# Patient Record
Sex: MALE | Race: WHITE
[De-identification: names, ages, dates, MRNs, and addresses within clinical notes are randomized per-mention and may not be internally consistent; named-entity substitution may affect disease eponyms.]

---

## 2018-03-01 ENCOUNTER — HOSPITAL ENCOUNTER (OUTPATIENT)
Dept: HOSPITAL 47 - RADCTMAIN | Age: 68
Discharge: HOME | End: 2018-03-01
Attending: SURGERY
Payer: MEDICARE

## 2018-03-01 DIAGNOSIS — N28.1: ICD-10-CM

## 2018-03-01 DIAGNOSIS — K57.90: Primary | ICD-10-CM

## 2018-03-01 LAB — BUN SERPL-SCNC: 27 MG/DL (ref 9–20)

## 2018-03-01 PROCEDURE — 36415 COLL VENOUS BLD VENIPUNCTURE: CPT

## 2018-03-01 PROCEDURE — 84520 ASSAY OF UREA NITROGEN: CPT

## 2018-03-01 PROCEDURE — 82565 ASSAY OF CREATININE: CPT

## 2018-03-01 PROCEDURE — 74177 CT ABD & PELVIS W/CONTRAST: CPT

## 2018-03-01 NOTE — CT
EXAMINATION TYPE: CT abdomen pelvis w con

 

DATE OF EXAM: 3/1/2018

 

COMPARISON: 12/4/2017

 

HISTORY: Diverticulitis and abd pain.

 

CT DLP: 1680 mGycm

Automated exposure control for dose reduction was used.

 

TECHNIQUE:  Helical acquisition of images was performed from the lung bases through the pelvis.

 

CONTRAST: 

Performed with Oral Contrast and with IV Contrast, patient injected with 100ml mL of Omnipaque 300.

 

FINDINGS: 

 

There is coarse interstitial density at the lung bases. There is no pleural effusion. Gallbladder is 
large. There are calcified granulomata in the liver. Bile ducts are not dilated. There is no evidence
 of pancreatic mass. Spleen appears normal.

 

There is no adrenal mass. Kidneys show satisfactory contrast opacification. There is no hydronephrosi
s. There is a 2 cm cortical cyst on the lateral left kidney. There is no retroperitoneal adenopathy. 
Abdominal aorta is atheromatous. Bladder distends smoothly. There is no sign of a pelvic mass. There 
is no ascites. There is no sign of free air. I see no intestinal wall thickening. There are diverticu
la in the sigmoid colon. There are surgical clips in the sigmoid colon. I see no sign of diverticulit
is. Appendix appears normal. There is no sign of a bowel obstruction. There are spondylotic changes i
n the lumbar spine and moderate spinal stenosis is present at L3-4.

IMPRESSION: 

EXTENSIVE INFILTRATES AT THE LUNG BASES CONSISTENT WITH PULMONARY FIBROSIS. ATHEROSCLEROTIC VASCULAR 
DISEASE.

 

MODERATE L3-4 BONY SPINAL STENOSIS. LEFT RENAL CORTICAL CYST. NO EVIDENCE OF DIVERTICULITIS. DIVERTIC
ULOSIS. NO ADVERSE CHANGE COMPARED TO OLD EXAM.

## 2018-03-28 ENCOUNTER — HOSPITAL ENCOUNTER (OUTPATIENT)
Dept: HOSPITAL 47 - OR | Age: 68
Discharge: HOME | End: 2018-03-28
Attending: SURGERY
Payer: MEDICARE

## 2018-03-28 VITALS — RESPIRATION RATE: 16 BRPM | TEMPERATURE: 98 F

## 2018-03-28 VITALS — HEART RATE: 72 BPM | SYSTOLIC BLOOD PRESSURE: 144 MMHG | DIASTOLIC BLOOD PRESSURE: 56 MMHG

## 2018-03-28 VITALS — BODY MASS INDEX: 25.5 KG/M2

## 2018-03-28 DIAGNOSIS — Z87.442: ICD-10-CM

## 2018-03-28 DIAGNOSIS — Z79.84: ICD-10-CM

## 2018-03-28 DIAGNOSIS — Z88.0: ICD-10-CM

## 2018-03-28 DIAGNOSIS — I10: ICD-10-CM

## 2018-03-28 DIAGNOSIS — I25.10: ICD-10-CM

## 2018-03-28 DIAGNOSIS — Z87.891: ICD-10-CM

## 2018-03-28 DIAGNOSIS — K80.10: Primary | ICD-10-CM

## 2018-03-28 DIAGNOSIS — E78.5: ICD-10-CM

## 2018-03-28 DIAGNOSIS — R59.0: ICD-10-CM

## 2018-03-28 DIAGNOSIS — Z95.1: ICD-10-CM

## 2018-03-28 DIAGNOSIS — E11.9: ICD-10-CM

## 2018-03-28 DIAGNOSIS — Z88.2: ICD-10-CM

## 2018-03-28 DIAGNOSIS — Z79.82: ICD-10-CM

## 2018-03-28 DIAGNOSIS — Z90.49: ICD-10-CM

## 2018-03-28 DIAGNOSIS — Z96.652: ICD-10-CM

## 2018-03-28 DIAGNOSIS — M19.90: ICD-10-CM

## 2018-03-28 LAB
GLUCOSE BLD-MCNC: 125 MG/DL (ref 75–99)
GLUCOSE BLD-MCNC: 160 MG/DL (ref 75–99)

## 2018-03-28 PROCEDURE — 88304 TISSUE EXAM BY PATHOLOGIST: CPT

## 2018-03-28 PROCEDURE — 47562 LAPAROSCOPIC CHOLECYSTECTOMY: CPT

## 2018-03-28 RX ADMIN — POTASSIUM CHLORIDE SCH MLS: 14.9 INJECTION, SOLUTION INTRAVENOUS at 13:35

## 2018-03-28 RX ADMIN — POTASSIUM CHLORIDE SCH MLS: 14.9 INJECTION, SOLUTION INTRAVENOUS at 14:07

## 2018-03-28 NOTE — P.OP
Date of Procedure: 03/28/18


Preoperative Diagnosis: 


Cholecystitis


Postoperative Diagnosis: 


Cholecystitis


Procedure(s) Performed: 


Laparoscopic cholecystectomy


Anesthesia: ORION


Surgeon: Tashi Muniz


Estimated Blood Loss (ml): 5


Pathology: other (Gallbladder)


Condition: stable


Disposition: PACU


Description of Procedure: 


The patient was placed on the operating table.   The patient received a general 

endotracheal tube anesthesia.    The patients abdomen was prepped and draped 

in the usual sterile fashion.    Through an infraumbilical stab incision, the 

fascia of the anterior abdominal wall was grasped with a pair of Kochers and 

then the Veress needle was placed in the peritoneal cavity.   Position of the 

Veress needle was confirmed with positive drop test.   The abdomen was then 

insufflated.  After adequate insufflation, the 10 mm trocar was placed in the 

peritoneal cavity.    Following this the laparoscope was placed in the 

peritoneal cavity. The patient was placed in the head-up, right side up 

position and then a 5 mm trocar was placed in the right lateral and right 

subcostal position under direct visualization.    A 8 mm trocar was placed in 

the epigastric position.  The gallbladder was grasped in the fundus and 

infundibulum.  Traction  on the gallbladder was placed in the lateral and the 

cephalad positions.  The triangle of Calot  was visualized..   The cystic duct 

was bluntly dissected until the union of the cystic duct and common bile duct 

was seen.    The cystic duct was then divided and sealed with the Harmonic 

scissors.  A PDS Endoloop was then placed throughout the cystic duct stump.  

The cystic artery divided and sealed with the Harmonic scissors.   The 

gallbladder was then removed from the liver bed using Harmonic scissors.   The 

gallbladder was then extracted through the epigastric port site.  Operative 

field was checked for any bleeding spots and Harmonic scissors was used to 

coagulate the liver bed.    The abdomen was irrigated.   The trocars were 

removed.  The skin was closed using interrupted 3-0 Vicryl suture.   Dermabond 

dressing were applied.   The patient tolerated the procedure well.

## 2018-03-28 NOTE — P.GSHP
History of Present Illness


H&P Date: 03/28/18


Chief Complaint: Right upper quadrant pain





This is a 67-year-old male referred from Dr. Ruiz Fairbanks.  Patient is a chronic 

complaints of right upper quadrant pain.  He states he has pain after eating.  

His recent HIDA scan shows a diminished ejection fraction he presents today for 

laparoscopic cholecystectomy for chronic right upper quadrant pain.





Past Medical History


Past Medical History: Coronary Artery Disease (CAD), Diabetes Mellitus, 

Hyperlipidemia, Hypertension, Osteoarthritis (OA), Vascular Disorder


Additional Past Medical History / Comment(s): Hx: Urinary calculus, 

diverticulitis, lower lt abdominal pain,.  GALLBLADDER DISORDER.  EDEMA CELIO 

LEGS.  TINY WOUND LT WISE, (WAS TREATED IN WOUND CLINIC IN PAST) KEEPS DRESSING 

WITH MEDIHONEY, NO DRAINAGE.


History of Any Multi-Drug Resistant Organisms: None Reported


Past Surgical History: Bowel Resection, Coronary Bypass/CABG, Heart 

Catheterization, Hernia Repair, Joint Replacement, Orthopedic Surgery


Additional Past Surgical History / Comment(s): ORIF Rt ankle.  Left knee 

arthroscopy, Total lt knee replacement.  COLONOSCOPY.  CHRONIC LT SHIN WOUND.  

CABG-9/14/2010


Past Anesthesia/Blood Transfusion Reactions: No Reported Reaction, Unable to 

Obtain


Additional Past Anesthesia/Blood Transfusion Reaction / Comment(s): (ADOPTED)


Smoking Status: Former smoker





- Past Family History


  ** Mother


Family Medical History: Unable to Obtain


Additional Family Medical History / Comment(s): adopted





Medications and Allergies


 Home Medications











 Medication  Instructions  Recorded  Confirmed  Type


 


Allopurinol [Zyloprim] 300 mg PO DAILY 05/12/14 03/28/18 History


 


Hydrocodone/Acetaminophen [Vicodin 0.5 - 1 tab PO BID 05/12/14 03/28/18 History





Hp  mg Tablet]    


 


amLODIPine [Norvasc] 10 mg PO HS 05/12/14 03/28/18 History


 


glipiZIDE [Glucotrol XL] 10 mg PO DAILY 05/12/14 03/28/18 History


 


metFORMIN HCL [Glucophage] 500 mg PO QAM 05/12/14 03/28/18 History


 


Aspirin 325 mg PO DAILY 12/06/17 03/28/18 History


 


Acetaminophen [Tylenol Arthritis] 650 mg PO Q8H PRN 03/22/18 03/28/18 History


 


Metoprolol Tartrate [Lopressor] 25 mg PO BID 03/22/18 03/28/18 History


 


Multivitamins, Thera [Multivitamin 1 tab PO DAILY 03/26/18 03/28/18 History





(formulary)]    


 


metFORMIN HCL [Glucophage] 1,000 mg PO HS 03/26/18 03/28/18 History











 Allergies











Allergy/AdvReac Type Severity Reaction Status Date / Time


 


Penicillins Allergy  Unknown Verified 03/28/18 13:18


 


Sulfa (Sulfonamide Allergy  Unknown Verified 03/28/18 13:18





Antibiotics)     














Surgical - Exam


 Vital Signs











Temp Pulse Resp BP Pulse Ox


 


 97.7 F   60   20   162/76   99 


 


 03/28/18 13:26  03/28/18 13:26  03/28/18 13:26  03/28/18 13:26  03/28/18 13:26














- General


well developed, no distress





- Eyes


PERRL





- ENT


normal pinna





- Neck


no masses





- Respiratory


normal expansion





- Cardiovascular


Rhythm: regular





- Abdomen


Abdomen: soft





Results





- Labs


 Abnormal Lab Results - Last 24 Hours (Table)











  03/28/18 Range/Units





  13:29 


 


POC Glucose (mg/dL)  125 H  (75-99)  mg/dL














Assessment and Plan


Assessment: 





Right upper quadrant pain





Chronic cholecystitis





We'll perform laparoscopic cholecystectomy.

## 2018-11-12 ENCOUNTER — HOSPITAL ENCOUNTER (OUTPATIENT)
Dept: HOSPITAL 47 - LABPAT | Age: 68
Discharge: HOME | End: 2018-11-12
Payer: MEDICARE

## 2018-11-12 DIAGNOSIS — E78.1: ICD-10-CM

## 2018-11-12 DIAGNOSIS — I10: ICD-10-CM

## 2018-11-12 DIAGNOSIS — I25.10: ICD-10-CM

## 2018-11-12 DIAGNOSIS — Z01.812: Primary | ICD-10-CM

## 2018-11-12 DIAGNOSIS — R07.9: ICD-10-CM

## 2018-11-12 LAB
ANION GAP SERPL CALC-SCNC: 6 MMOL/L
BUN SERPL-SCNC: 19 MG/DL (ref 9–20)
CHLORIDE SERPL-SCNC: 106 MMOL/L (ref 98–107)
CO2 SERPL-SCNC: 26 MMOL/L (ref 22–30)
ERYTHROCYTE [DISTWIDTH] IN BLOOD BY AUTOMATED COUNT: 4.08 M/UL (ref 4.3–5.9)
ERYTHROCYTE [DISTWIDTH] IN BLOOD: 13.4 % (ref 11.5–15.5)
HCT VFR BLD AUTO: 41.5 % (ref 39–53)
HGB BLD-MCNC: 13.1 GM/DL (ref 13–17.5)
MCH RBC QN AUTO: 32.1 PG (ref 25–35)
MCHC RBC AUTO-ENTMCNC: 31.5 G/DL (ref 31–37)
MCV RBC AUTO: 101.7 FL (ref 80–100)
PLATELET # BLD AUTO: 259 K/UL (ref 150–450)
POTASSIUM SERPL-SCNC: 5 MMOL/L (ref 3.5–5.1)
SODIUM SERPL-SCNC: 138 MMOL/L (ref 137–145)
WBC # BLD AUTO: 7.9 K/UL (ref 3.8–10.6)

## 2018-11-12 PROCEDURE — 84520 ASSAY OF UREA NITROGEN: CPT

## 2018-11-12 PROCEDURE — 36415 COLL VENOUS BLD VENIPUNCTURE: CPT

## 2018-11-12 PROCEDURE — 82565 ASSAY OF CREATININE: CPT

## 2018-11-12 PROCEDURE — 80051 ELECTROLYTE PANEL: CPT

## 2018-11-12 PROCEDURE — 85027 COMPLETE CBC AUTOMATED: CPT

## 2018-11-15 ENCOUNTER — HOSPITAL ENCOUNTER (OUTPATIENT)
Dept: HOSPITAL 47 - CATHCVL | Age: 68
Discharge: HOME | End: 2018-11-15
Payer: MEDICARE

## 2018-11-15 VITALS — RESPIRATION RATE: 16 BRPM | SYSTOLIC BLOOD PRESSURE: 156 MMHG | HEART RATE: 76 BPM | DIASTOLIC BLOOD PRESSURE: 72 MMHG

## 2018-11-15 VITALS — BODY MASS INDEX: 27.9 KG/M2

## 2018-11-15 VITALS — TEMPERATURE: 97.9 F

## 2018-11-15 DIAGNOSIS — Z79.84: ICD-10-CM

## 2018-11-15 DIAGNOSIS — E78.5: ICD-10-CM

## 2018-11-15 DIAGNOSIS — I25.9: ICD-10-CM

## 2018-11-15 DIAGNOSIS — Z88.0: ICD-10-CM

## 2018-11-15 DIAGNOSIS — R94.39: ICD-10-CM

## 2018-11-15 DIAGNOSIS — E78.00: ICD-10-CM

## 2018-11-15 DIAGNOSIS — Z79.899: ICD-10-CM

## 2018-11-15 DIAGNOSIS — Z79.02: ICD-10-CM

## 2018-11-15 DIAGNOSIS — E11.9: ICD-10-CM

## 2018-11-15 DIAGNOSIS — F17.200: ICD-10-CM

## 2018-11-15 DIAGNOSIS — I10: ICD-10-CM

## 2018-11-15 DIAGNOSIS — Z95.1: ICD-10-CM

## 2018-11-15 DIAGNOSIS — I25.110: Primary | ICD-10-CM

## 2018-11-15 LAB — GLUCOSE BLD-MCNC: 136 MG/DL (ref 75–99)

## 2018-11-15 PROCEDURE — 93459 L HRT ART/GRFT ANGIO: CPT

## 2018-11-15 NOTE — LTR
11/15/2018



To: Dr. Fairbanks



Re: Loy Spencer (1950)



Dear Dr. Fairbanks,



MrKatie Spencer underwent a heart catheterization which showed patency of all his

bypasses.



I want to thank you for allowing us to participate in his care. Please do not hesitate

to call if you have any question or concerns.



Sincerely,







Geovani Ivan MD





MMCELINE / CARLON: [id number] / Job#: [id number]

## 2018-11-15 NOTE — CC
CARDIAC CATHETERIZATION REPORT



DATE OF SERVICE:

11/15/2018



PERFORMING PHYSICIAN:

Geovani Ivan MD, interventional cardiologist.



PROCEDURES PERFORMED:

1. Selective left and right coronary angiogram.

2. SVG to first and second obtuse marginal branch angiogram.

3. LIMA to LAD angiogram.

4. Left heart catheterization.



INDICATION:

This is a pleasant 67-year-old gentleman with known history of coronary artery disease

and prior coronary artery bypass grafting with known SVG to OM1 and SVG to OM2 as well

as OJEDA to LAD. He was experiencing chest discomfort with exertion.  Because of that,

heart catheterization was advised.



APPROACH:

Right common femoral artery.



COMPLICATIONS:

None.



LEVEL OF SEDATION:

Moderate, with sedation length of 30 minutes.



PROCEDURE DESCRIPTION:

After obtaining informed consent, the patient was brought to the cardiac cath lab.  The

right common femoral artery was cannulated using micropuncture technique. The

micropuncture wire passed easily. Then I placed a 6-Czech sheath in the right common

femoral artery.  After that I did selective right and left coronary angiogram using JL4

and JR4 catheters. SVG to OM1 and OM2 was performed using the JR4 catheter.  The LIMA

to LAD angiogram was also performed using the JR4 catheter.  After that I did left

heart catheterization using 6-Czech pigtail catheter.  The procedure was completed

without any complication.



SELECTIVE CORONARY ANGIOGRAM:

1. Left main.  The left main appeared to be calcified with disease in the range of 30%

    only.  It bifurcates into left circumflex and left anterior descending artery.

2. The left circumflex is a large-caliber vessel and it is a dominant vessel.  The

    proximal left circumflex appeared to have a lesion in the range of 60%.  The

    circumflex after that in the proximal portion gives rise to OM branch which

    appeared to be bypassed with competitive flow from the SVG to that OM. The mid and

    distal left circumflex appeared to have mild disease only.

3. The LAD is 100% occluded in the proximal portion.

4. The right coronary artery is a small- to medium-caliber vessel and is a nondominant

    vessel with intermediate disease in the mid portion.



CORONARY BYPASS ANGIOGRAM:

1. The SVG to OM1 is patent.

2. The SVG to OM2 is patent.

3. The LIMA to LAD is patent as well.



HEMODYNAMICS:

The left ventricular end-diastolic pressure was 4 mmHg with mild gradient across the

aortic valve.



CONCLUSION:

1. Severe double-vessel coronary artery disease involving the LAD and left circumflex.

2. Patent LIMA to LAD.

3. Patent SVG to OM1.

4. Patent SVG to OM2.



POST-PROCEDURE MANAGEMENT:

1. Maximize medical treatment.

2. Follow up with the patient.





MICHEAL / PRASHANTH: [id number] / Job#: [id number]

## 2018-11-22 ENCOUNTER — HOSPITAL ENCOUNTER (INPATIENT)
Dept: HOSPITAL 47 - EC | Age: 68
LOS: 2 days | Discharge: LEFT BEFORE DISCHARGE | DRG: 69 | End: 2018-11-24
Attending: HOSPITALIST | Admitting: HOSPITALIST
Payer: MEDICARE

## 2018-11-22 DIAGNOSIS — Z79.82: ICD-10-CM

## 2018-11-22 DIAGNOSIS — Z88.0: ICD-10-CM

## 2018-11-22 DIAGNOSIS — Z95.1: ICD-10-CM

## 2018-11-22 DIAGNOSIS — I63.233: ICD-10-CM

## 2018-11-22 DIAGNOSIS — Z96.652: ICD-10-CM

## 2018-11-22 DIAGNOSIS — G45.9: Primary | ICD-10-CM

## 2018-11-22 DIAGNOSIS — E11.51: ICD-10-CM

## 2018-11-22 DIAGNOSIS — N18.3: ICD-10-CM

## 2018-11-22 DIAGNOSIS — E78.5: ICD-10-CM

## 2018-11-22 DIAGNOSIS — Z79.02: ICD-10-CM

## 2018-11-22 DIAGNOSIS — E11.22: ICD-10-CM

## 2018-11-22 DIAGNOSIS — Z79.899: ICD-10-CM

## 2018-11-22 DIAGNOSIS — I12.9: ICD-10-CM

## 2018-11-22 DIAGNOSIS — Z88.2: ICD-10-CM

## 2018-11-22 DIAGNOSIS — M19.90: ICD-10-CM

## 2018-11-22 DIAGNOSIS — I70.8: ICD-10-CM

## 2018-11-22 DIAGNOSIS — H49.02: ICD-10-CM

## 2018-11-22 DIAGNOSIS — I25.10: ICD-10-CM

## 2018-11-22 DIAGNOSIS — Z87.891: ICD-10-CM

## 2018-11-22 DIAGNOSIS — Z87.442: ICD-10-CM

## 2018-11-22 LAB
ALBUMIN SERPL-MCNC: 4.1 G/DL (ref 3.5–5)
ALP SERPL-CCNC: 130 U/L (ref 38–126)
ALT SERPL-CCNC: 54 U/L (ref 21–72)
ANION GAP SERPL CALC-SCNC: 9 MMOL/L
APTT BLD: 24.4 SEC (ref 22–30)
AST SERPL-CCNC: 46 U/L (ref 17–59)
BASOPHILS # BLD AUTO: 0.1 K/UL (ref 0–0.2)
BASOPHILS NFR BLD AUTO: 1 %
BUN SERPL-SCNC: 28 MG/DL (ref 9–20)
CALCIUM SPEC-MCNC: 9.4 MG/DL (ref 8.4–10.2)
CHLORIDE SERPL-SCNC: 105 MMOL/L (ref 98–107)
CK SERPL-CCNC: 120 U/L (ref 55–170)
CO2 SERPL-SCNC: 25 MMOL/L (ref 22–30)
EOSINOPHIL # BLD AUTO: 0.2 K/UL (ref 0–0.7)
EOSINOPHIL NFR BLD AUTO: 2 %
ERYTHROCYTE [DISTWIDTH] IN BLOOD BY AUTOMATED COUNT: 4.02 M/UL (ref 4.3–5.9)
ERYTHROCYTE [DISTWIDTH] IN BLOOD: 13.5 % (ref 11.5–15.5)
GLUCOSE BLD-MCNC: 122 MG/DL (ref 75–99)
GLUCOSE BLD-MCNC: 149 MG/DL (ref 75–99)
GLUCOSE SERPL-MCNC: 103 MG/DL (ref 74–99)
HCT VFR BLD AUTO: 39.7 % (ref 39–53)
HGB BLD-MCNC: 13.5 GM/DL (ref 13–17.5)
INR PPP: 1 (ref ?–1.2)
LYMPHOCYTES # SPEC AUTO: 1.9 K/UL (ref 1–4.8)
LYMPHOCYTES NFR SPEC AUTO: 20 %
MCH RBC QN AUTO: 33.6 PG (ref 25–35)
MCHC RBC AUTO-ENTMCNC: 34 G/DL (ref 31–37)
MCV RBC AUTO: 98.8 FL (ref 80–100)
MONOCYTES # BLD AUTO: 0.8 K/UL (ref 0–1)
MONOCYTES NFR BLD AUTO: 8 %
NEUTROPHILS # BLD AUTO: 6.3 K/UL (ref 1.3–7.7)
NEUTROPHILS NFR BLD AUTO: 68 %
PH UR: 6.5 [PH] (ref 5–8)
PLATELET # BLD AUTO: 254 K/UL (ref 150–450)
POTASSIUM SERPL-SCNC: 4.9 MMOL/L (ref 3.5–5.1)
PROT SERPL-MCNC: 7.3 G/DL (ref 6.3–8.2)
PT BLD: 9.9 SEC (ref 9–12)
SODIUM SERPL-SCNC: 139 MMOL/L (ref 137–145)
SP GR UR: 1.03 (ref 1–1.03)
TROPONIN I SERPL-MCNC: 0.02 NG/ML (ref 0–0.03)
UROBILINOGEN UR QL STRIP: <2 MG/DL (ref ?–2)
WBC # BLD AUTO: 9.4 K/UL (ref 3.8–10.6)

## 2018-11-22 PROCEDURE — 83036 HEMOGLOBIN GLYCOSYLATED A1C: CPT

## 2018-11-22 PROCEDURE — 36415 COLL VENOUS BLD VENIPUNCTURE: CPT

## 2018-11-22 PROCEDURE — 70450 CT HEAD/BRAIN W/O DYE: CPT

## 2018-11-22 PROCEDURE — 80048 BASIC METABOLIC PNL TOTAL CA: CPT

## 2018-11-22 PROCEDURE — 82553 CREATINE MB FRACTION: CPT

## 2018-11-22 PROCEDURE — 93005 ELECTROCARDIOGRAM TRACING: CPT

## 2018-11-22 PROCEDURE — 70496 CT ANGIOGRAPHY HEAD: CPT

## 2018-11-22 PROCEDURE — 81003 URINALYSIS AUTO W/O SCOPE: CPT

## 2018-11-22 PROCEDURE — 85025 COMPLETE CBC W/AUTO DIFF WBC: CPT

## 2018-11-22 PROCEDURE — 85610 PROTHROMBIN TIME: CPT

## 2018-11-22 PROCEDURE — 80053 COMPREHEN METABOLIC PANEL: CPT

## 2018-11-22 PROCEDURE — 85730 THROMBOPLASTIN TIME PARTIAL: CPT

## 2018-11-22 PROCEDURE — 95816 EEG AWAKE AND DROWSY: CPT

## 2018-11-22 PROCEDURE — 80061 LIPID PANEL: CPT

## 2018-11-22 PROCEDURE — 82550 ASSAY OF CK (CPK): CPT

## 2018-11-22 PROCEDURE — 93306 TTE W/DOPPLER COMPLETE: CPT

## 2018-11-22 PROCEDURE — 84484 ASSAY OF TROPONIN QUANT: CPT

## 2018-11-22 PROCEDURE — 83090 ASSAY OF HOMOCYSTEINE: CPT

## 2018-11-22 PROCEDURE — 70551 MRI BRAIN STEM W/O DYE: CPT

## 2018-11-22 PROCEDURE — 70498 CT ANGIOGRAPHY NECK: CPT

## 2018-11-22 RX ADMIN — CEFAZOLIN SCH MLS/HR: 330 INJECTION, POWDER, FOR SOLUTION INTRAMUSCULAR; INTRAVENOUS at 20:14

## 2018-11-22 NOTE — ED
General Adult HPI





- General


Chief complaint: Neuro Symptoms/Deficit


Stated complaint: Visual changes


Time Seen by Provider: 11/22/18 15:50


Source: patient, RN notes reviewed, old records reviewed


Mode of arrival: wheelchair


Limitations: no limitations





- History of Present Illness


Initial comments: 





68-year-old male history of CAD, peripheral vascular disease, and diabetes 

presenting with double vision.  Symptoms began approximately 30 minutes prior 

to arrival.  No injury noted, no trauma.  No headache.  Denies focal numbness 

or weakness.  No history of CVA or TIA.  No chest pain or shortness of breath.  

Patient did have heart catheterization one week ago.  Currently on aspirin and 

Plavix.





- Related Data


 Home Medications











 Medication  Instructions  Recorded  Confirmed


 


Allopurinol [Zyloprim] 300 mg PO DAILY 05/12/14 11/15/18


 


glipiZIDE [Glucotrol XL] 10 mg PO DAILY 05/12/14 11/15/18


 


Aspirin 325 mg PO DAILY 12/06/17 11/15/18


 


Metoprolol Tartrate [Lopressor] 50 mg PO DAILY 03/22/18 11/15/18


 


Multivitamins, Thera [Multivitamin 1 tab PO DAILY 03/26/18 11/15/18





(formulary)]   


 


Atorvastatin [Lipitor] 40 mg PO DAILY 11/12/18 11/15/18


 


Clopidogrel [Plavix] 75 mg PO DAILY 11/12/18 11/15/18


 


Docusate [Colace] 100 mg PO DAILY 11/12/18 11/15/18


 


HYDROcodone/APAP 10-325MG [Norco 0.5 tab PO DAILY PRN 11/12/18 11/15/18





]   


 


Tamsulosin HCl [Flomax] 0.4 mg PO DAILY 11/12/18 11/15/18


 


Ubidecarenone [Co Q-10] 100 mg PO DAILY 11/12/18 11/15/18











 Allergies











Allergy/AdvReac Type Severity Reaction Status Date / Time


 


Penicillins Allergy  Itching Verified 11/22/18 16:12


 


Sulfa (Sulfonamide Allergy  Unknown Verified 11/22/18 16:12





Antibiotics)     














Review of Systems


ROS Statement: 


Those systems with pertinent positive or pertinent negative responses have been 

documented in the HPI.





ROS Other: All systems not noted in ROS Statement are negative.





Past Medical History


Past Medical History: Coronary Artery Disease (CAD), Diabetes Mellitus, 

Hyperlipidemia, Hypertension, Osteoarthritis (OA), Prostate Disorder, Vascular 

Disorder


Additional Past Medical History / Comment(s): Hx: Urinary calculus, 

diverticulitis,.  EDEMA CELIO LEGS.  TINY WOUND LT WISE, (WAS TREATED IN WOUND 

CLINIC IN PAST) KEEPS DRESSING WITH MEDIHONEY, NO DRAINAGE.


History of Any Multi-Drug Resistant Organisms: None Reported


Past Surgical History: Bowel Resection, Cholecystectomy, Coronary Bypass/CABG, 

Heart Catheterization, Hernia Repair, Joint Replacement, Orthopedic Surgery


Additional Past Surgical History / Comment(s): ORIF Rt ankle, stent in abdomen.

  Left knee arthroscopy, Total lt knee replacement.  COLONOSCOPY.  CHRONIC LT 

SHIN WOUND.  CABG-9/14/2010


Past Anesthesia/Blood Transfusion Reactions: No Reported Reaction


Additional Past Anesthesia/Blood Transfusion Reaction / Comment(s): (ADOPTED)


Past Psychological History: No Psychological Hx Reported


Smoking Status: Former smoker


Past Alcohol Use History: None Reported


Past Drug Use History: None Reported





- Past Family History


  ** Mother


Family Medical History: Unable to Obtain


Additional Family Medical History / Comment(s): adopted





General Exam


Limitations: no limitations


General appearance: alert, in no apparent distress


Head exam: Present: atraumatic, normocephalic


Eye exam: Present: normal appearance, PERRL.  Absent: EOMI (Left eye medial 

rectus palsy)


Neck exam: Present: normal inspection.  Absent: tenderness, meningismus


Respiratory exam: Present: normal lung sounds bilaterally.  Absent: respiratory 

distress, wheezes


Cardiovascular Exam: Present: regular rate, normal rhythm


GI/Abdominal exam: Present: soft.  Absent: distended, tenderness


Extremities exam: Present: normal inspection


Back exam: Present: normal inspection, full ROM


Neurological exam: Present: alert, oriented X3, motor sensory deficit.  Absent: 

CN II-XII intact (Third nerve palsy (I)


Psychiatric exam: Present: normal affect, normal mood


Skin exam: Present: warm, dry, intact.  Absent: cyanosis, diaphoretic





Course


 Vital Signs











  11/22/18 11/22/18 11/22/18





  15:41 16:30 17:00


 


Temperature 97.4 F L  


 


Pulse Rate 63  57 L


 


Respiratory 18  20





Rate   


 


Blood Pressure 151/79 134/69 125/54


 


O2 Sat by Pulse 98  97





Oximetry   














  11/22/18





  17:30


 


Temperature 


 


Pulse Rate 51 L


 


Respiratory 18





Rate 


 


Blood Pressure 143/65


 


O2 Sat by Pulse 96





Oximetry 














- Reevaluation(s)


Reevaluation #1: 





11/22/18 17:17


Patient reevaluated, vision improved, has improved medial gaze in the left eye.





EKG Findings





- EKG Comments:


EKG Findings:: EKG: Sinus bradycardia, left axis deviation, right bundle branch 

block no ST segment elevation, rate of 50, DE interval 168, QRS duration 152, 

, right bundle branch block is new compared to old EKG from 2018.  No 

interval EKG for comparison.





Medical Decision Making





- Medical Decision Making





68-year-old male presenting with double vision.  Exam reveals a third nerve 

palsy on the left eye with inability to adduct the left eye towards anemia.  

This resolves while in the emergency department.  He has normal vision.  

Remainder of the neuro exam is unremarkable.  CT was performed which is 

negative for acute cranial hemorrhage or mass effect, CT angiography negative 

for aneurysm or scrotal hemorrhage, there is 75% stenosis of the bilateral 

internal carotid arteries.  Patient will be kept for further TIA and CVA 

workup.  Neurology placed on consult.  Case discussed with attending physician 

Dr. Hess.





- Lab Data


Result diagrams: 


 11/22/18 16:00





 11/22/18 16:00


 Lab Results











  11/22/18 11/22/18 11/22/18 Range/Units





  16:00 16:00 16:00 


 


WBC   9.4   (3.8-10.6)  k/uL


 


RBC   4.02 L   (4.30-5.90)  m/uL


 


Hgb   13.5   (13.0-17.5)  gm/dL


 


Hct   39.7   (39.0-53.0)  %


 


MCV   98.8   (80.0-100.0)  fL


 


MCH   33.6   (25.0-35.0)  pg


 


MCHC   34.0   (31.0-37.0)  g/dL


 


RDW   13.5   (11.5-15.5)  %


 


Plt Count   254   (150-450)  k/uL


 


Neutrophils %   68   %


 


Lymphocytes %   20   %


 


Monocytes %   8   %


 


Eosinophils %   2   %


 


Basophils %   1   %


 


Neutrophils #   6.3   (1.3-7.7)  k/uL


 


Lymphocytes #   1.9   (1.0-4.8)  k/uL


 


Monocytes #   0.8   (0-1.0)  k/uL


 


Eosinophils #   0.2   (0-0.7)  k/uL


 


Basophils #   0.1   (0-0.2)  k/uL


 


PT     (9.0-12.0)  sec


 


INR     (<1.2)  


 


APTT     (22.0-30.0)  sec


 


Sodium    139  (137-145)  mmol/L


 


Potassium    4.9  (3.5-5.1)  mmol/L


 


Chloride    105  ()  mmol/L


 


Carbon Dioxide    25  (22-30)  mmol/L


 


Anion Gap    9  mmol/L


 


BUN    28 H  (9-20)  mg/dL


 


Creatinine    1.40 H  (0.66-1.25)  mg/dL


 


Est GFR (CKD-EPI)AfAm    60  (>60 ml/min/1.73 sqM)  


 


Est GFR (CKD-EPI)NonAf    51  (>60 ml/min/1.73 sqM)  


 


Glucose    103 H  (74-99)  mg/dL


 


Calcium    9.4  (8.4-10.2)  mg/dL


 


Total Bilirubin    0.5  (0.2-1.3)  mg/dL


 


AST    46  (17-59)  U/L


 


ALT    54  (21-72)  U/L


 


Alkaline Phosphatase    130 H  ()  U/L


 


Total Creatine Kinase  120    ()  U/L


 


CK-MB (CK-2)  1.3    (0.0-2.4)  ng/mL


 


CK-MB (CK-2) Rel Index  1.1    


 


Troponin I  0.016    (0.000-0.034)  ng/mL


 


Total Protein    7.3  (6.3-8.2)  g/dL


 


Albumin    4.1  (3.5-5.0)  g/dL














  11/22/18 Range/Units





  16:00 


 


WBC   (3.8-10.6)  k/uL


 


RBC   (4.30-5.90)  m/uL


 


Hgb   (13.0-17.5)  gm/dL


 


Hct   (39.0-53.0)  %


 


MCV   (80.0-100.0)  fL


 


MCH   (25.0-35.0)  pg


 


MCHC   (31.0-37.0)  g/dL


 


RDW   (11.5-15.5)  %


 


Plt Count   (150-450)  k/uL


 


Neutrophils %   %


 


Lymphocytes %   %


 


Monocytes %   %


 


Eosinophils %   %


 


Basophils %   %


 


Neutrophils #   (1.3-7.7)  k/uL


 


Lymphocytes #   (1.0-4.8)  k/uL


 


Monocytes #   (0-1.0)  k/uL


 


Eosinophils #   (0-0.7)  k/uL


 


Basophils #   (0-0.2)  k/uL


 


PT  9.9  (9.0-12.0)  sec


 


INR  1.0  (<1.2)  


 


APTT  24.4  (22.0-30.0)  sec


 


Sodium   (137-145)  mmol/L


 


Potassium   (3.5-5.1)  mmol/L


 


Chloride   ()  mmol/L


 


Carbon Dioxide   (22-30)  mmol/L


 


Anion Gap   mmol/L


 


BUN   (9-20)  mg/dL


 


Creatinine   (0.66-1.25)  mg/dL


 


Est GFR (CKD-EPI)AfAm   (>60 ml/min/1.73 sqM)  


 


Est GFR (CKD-EPI)NonAf   (>60 ml/min/1.73 sqM)  


 


Glucose   (74-99)  mg/dL


 


Calcium   (8.4-10.2)  mg/dL


 


Total Bilirubin   (0.2-1.3)  mg/dL


 


AST   (17-59)  U/L


 


ALT   (21-72)  U/L


 


Alkaline Phosphatase   ()  U/L


 


Total Creatine Kinase   ()  U/L


 


CK-MB (CK-2)   (0.0-2.4)  ng/mL


 


CK-MB (CK-2) Rel Index   


 


Troponin I   (0.000-0.034)  ng/mL


 


Total Protein   (6.3-8.2)  g/dL


 


Albumin   (3.5-5.0)  g/dL














Disposition


Clinical Impression: 


 Transient cerebral ischemia, Third nerve palsy of left eye





Disposition: ADMITTED AS IP TO THIS [facility]


Condition: Stable


Is patient prescribed a controlled substance at d/c from ED?: No


Referrals: 


Ruiz Fairbanks MD [Primary Care Provider] - 1-2 days


Decision to Admit Reason: Admit from EC


Decision Date: 11/22/18


Decision Time: 19:11

## 2018-11-22 NOTE — CT
EXAMINATION TYPE: CT brain wo con for TPA

 

DATE OF EXAM: 11/22/2018

 

COMPARISON: None

 

HISTORY: Vision changes.

 

CT DLP: 1037 mGycm

Automated exposure control for dose reduction was used.

 

FINDINGS: 

There is cerebral cortical atrophy. There is no mass effect nor midline shift. There is no sign of in
tracranial hemorrhage. The calvarium is intact.

 

IMPRESSION: 

MILD ATROPHY. NO ACUTE INTRACRANIAL ABNORMALITY.

## 2018-11-22 NOTE — CT
EXAMINATION TYPE: CT angio head neck

 

DATE OF EXAM: 11/22/2018

 

HISTORY: Vision changes.

 

COMPARISON: None

 

CT DLP: 497.7 mGycm.  Automated Exposure Control for Dose Reduction was Utilized.

 

TECHNIQUE:  CTA scan of the neck is performed with IV Contrast, patient injected with 65ml mL of Isov
ue 370, axial images are obtained, coronal and sagittal reformatted images are reviewed. Three-D yuval
nstructed images are created on an independent workstation and reviewed.

 

FINDINGS:

 

There is normal branching pattern of the great vessels at the aortic arch. There is atherosclerotic p
laque formation at the aortic arch. There is arterial flow in both vertebral arteries which are fairl
y symmetric. There is arterial flow in the common internal and external carotid arteries bilaterally.
 There is variable plaque formation at the carotid artery bifurcations. There is significant luminal 
narrowing of the left carotid artery bifurcation with probably 75% stenosis on the left side. There i
s similar more than 75% stenosis of the right internal heart artery at the origin.

 

There is no evidence of carotid or vertebral artery aneurysm or dissection. There is arterial flow in
 the vertebrobasilar artery system. There is arterial flow in the anterior middle and posterior cereb
ral arteries bilaterally. I see no evidence of intracranial aneurysm or neovascularity. There is norm
al contrast opacification of the venous sinuses.

 

There is probably 50% stenosis at the origin of the left subclavian artery. There is moderate plaque 
formation at the origin of the right vertebral artery. There is probably some fibrotic change in the 
upper lobes.

 

 

Impression

 

There is moderate plaque formation as above. There is more than 75% stenosis of the origins of both i
nternal carotid arteries. No evidence of intracranial aneurysm or stenosis.

## 2018-11-23 VITALS — RESPIRATION RATE: 18 BRPM

## 2018-11-23 LAB
ANION GAP SERPL CALC-SCNC: 5 MMOL/L
BASOPHILS # BLD AUTO: 0.1 K/UL (ref 0–0.2)
BASOPHILS NFR BLD AUTO: 1 %
BUN SERPL-SCNC: 18 MG/DL (ref 9–20)
CALCIUM SPEC-MCNC: 8.8 MG/DL (ref 8.4–10.2)
CHLORIDE SERPL-SCNC: 110 MMOL/L (ref 98–107)
CHOLEST SERPL-MCNC: 120 MG/DL (ref ?–200)
CO2 SERPL-SCNC: 25 MMOL/L (ref 22–30)
EOSINOPHIL # BLD AUTO: 0.3 K/UL (ref 0–0.7)
EOSINOPHIL NFR BLD AUTO: 3 %
ERYTHROCYTE [DISTWIDTH] IN BLOOD BY AUTOMATED COUNT: 3.68 M/UL (ref 4.3–5.9)
ERYTHROCYTE [DISTWIDTH] IN BLOOD: 13.4 % (ref 11.5–15.5)
GLUCOSE BLD-MCNC: 119 MG/DL (ref 75–99)
GLUCOSE BLD-MCNC: 124 MG/DL (ref 75–99)
GLUCOSE BLD-MCNC: 276 MG/DL (ref 75–99)
GLUCOSE BLD-MCNC: 89 MG/DL (ref 75–99)
GLUCOSE SERPL-MCNC: 62 MG/DL (ref 74–99)
HBA1C MFR BLD: 5.7 % (ref 4–6)
HCT VFR BLD AUTO: 37.1 % (ref 39–53)
HDLC SERPL-MCNC: 53 MG/DL (ref 40–60)
HGB BLD-MCNC: 12.1 GM/DL (ref 13–17.5)
LDLC SERPL CALC-MCNC: 55 MG/DL (ref 0–99)
LYMPHOCYTES # SPEC AUTO: 2 K/UL (ref 1–4.8)
LYMPHOCYTES NFR SPEC AUTO: 23 %
MCH RBC QN AUTO: 32.9 PG (ref 25–35)
MCHC RBC AUTO-ENTMCNC: 32.7 G/DL (ref 31–37)
MCV RBC AUTO: 100.8 FL (ref 80–100)
MONOCYTES # BLD AUTO: 0.8 K/UL (ref 0–1)
MONOCYTES NFR BLD AUTO: 9 %
NEUTROPHILS # BLD AUTO: 5.5 K/UL (ref 1.3–7.7)
NEUTROPHILS NFR BLD AUTO: 63 %
PLATELET # BLD AUTO: 223 K/UL (ref 150–450)
POTASSIUM SERPL-SCNC: 4.3 MMOL/L (ref 3.5–5.1)
SODIUM SERPL-SCNC: 140 MMOL/L (ref 137–145)
TRIGL SERPL-MCNC: 58 MG/DL (ref ?–150)
WBC # BLD AUTO: 8.7 K/UL (ref 3.8–10.6)

## 2018-11-23 RX ADMIN — INSULIN ASPART SCH: 100 INJECTION, SOLUTION INTRAVENOUS; SUBCUTANEOUS at 05:55

## 2018-11-23 RX ADMIN — PANTOPRAZOLE SODIUM SCH MG: 40 TABLET, DELAYED RELEASE ORAL at 06:19

## 2018-11-23 RX ADMIN — INSULIN ASPART SCH UNIT: 100 INJECTION, SOLUTION INTRAVENOUS; SUBCUTANEOUS at 17:30

## 2018-11-23 RX ADMIN — INSULIN ASPART SCH: 100 INJECTION, SOLUTION INTRAVENOUS; SUBCUTANEOUS at 21:43

## 2018-11-23 RX ADMIN — ASPIRIN 325 MG ORAL TABLET SCH MG: 325 PILL ORAL at 08:43

## 2018-11-23 RX ADMIN — HEPARIN SODIUM SCH: 5000 INJECTION, SOLUTION INTRAVENOUS; SUBCUTANEOUS at 05:55

## 2018-11-23 RX ADMIN — INSULIN ASPART SCH: 100 INJECTION, SOLUTION INTRAVENOUS; SUBCUTANEOUS at 08:39

## 2018-11-23 RX ADMIN — METOPROLOL TARTRATE SCH MG: 25 TABLET, FILM COATED ORAL at 21:46

## 2018-11-23 RX ADMIN — HEPARIN SODIUM SCH UNIT: 5000 INJECTION, SOLUTION INTRAVENOUS; SUBCUTANEOUS at 08:43

## 2018-11-23 RX ADMIN — INSULIN ASPART SCH: 100 INJECTION, SOLUTION INTRAVENOUS; SUBCUTANEOUS at 11:42

## 2018-11-23 RX ADMIN — CEFAZOLIN SCH MLS/HR: 330 INJECTION, POWDER, FOR SOLUTION INTRAMUSCULAR; INTRAVENOUS at 08:43

## 2018-11-23 RX ADMIN — HEPARIN SODIUM SCH UNIT: 5000 INJECTION, SOLUTION INTRAVENOUS; SUBCUTANEOUS at 21:46

## 2018-11-23 NOTE — P.PN
Progress Note - Text





68-year-old white male, patient came with history of double vision both I with 

complete recovery.  Patient has a right shoulder rotator cuff he is scheduled 

to have a surgery next week patient does not give any history of motor deficit 

to the upper or lower extremity.





CT of the carotid shows 75% stenosis bilateral and also some left subclavian 

stenosis.  CT of the brain shows no evidence of infarction.  MRI of the brain 

done today patient has H is related to small vessel disease no evidence of 

acute infarction or bleeding





Plan is we will continue with antiplatelet therapy I will discuss with internal 

medicine and neurology follow with you

## 2018-11-23 NOTE — MR
MR brain without contrast

 

HISTORY: Stroke, vision changes

 

Multiplanar multisequence imaging through the brain and correlated to prior CT brain 20/2/2018

 

There is no restricted diffusion to suggest subacute infarct. Cortical atrophy is likely age-related.
 There are scattered hyperintensities within the subcortical, periventricular, pericallosal white mat
ter, approximately 5-10 lesions are present, largest in the pericallosal region on axial image 23 malathi
sures approximately 5 mm. There is no hemorrhage or hydrocephalus. Normal vascular flow voids are pre
sent. There is some motion on the exam which may limit sensitivity. The orbits show a symmetric appea
melly. Cerebellopontine angles, corpus callosum, pituitary, cervical medullary junction are normal. M
ild mucosal disease present in the maxillary sinus on the right, ethmoid air cells.

 

IMPRESSION: Nonspecific white matter demyelination may be related to chronic small vessel ischemia. A
ge-related atrophy. No abnormality evident to account for patient's symptoms.

## 2018-11-23 NOTE — ECHOF
Referral Reason:Thrombus



MEASUREMENTS

--------

HEIGHT: 177.8 cm

WEIGHT: 90.7 kg

BP: 149/68

RVIDd:   4.2 cm     (< 3.3)

IVSd:   1.3 cm     (0.6 - 1.1)

LVIDd:   5.3 cm     (3.9 - 5.3)

LVPWd:   1.4 cm     (0.6 - 1.1)

IVSs:   2.0 cm

LVIDs:   3.2 cm

LVPWs:   1.9 cm

LA Diam:   4.9 cm     (2.7 - 3.8)

LAESV Index (A-L):   35.76 ml/m

Ao Diam:   3.1 cm     (2.0 - 3.7)

AV Cusp:   1.3 cm     (1.5 - 2.6)

MV EXCURSION:   17.701 mm     (> 18.000)

MV EF SLOPE:   42 mm/s     (70 - 150)

EPSS:   0.7 cm

MV E Ovidio:   1.17 m/s

MV DecT:   215 ms

MV A Ovidio:   0.99 m/s

MV E/A Ratio:   1.18 

AV maxP.28 mmHg

AV meanPG:   10.64 mmHg

RAP:   5.00 mmHg

RVSP:   28.31 mmHg







FINDINGS

--------

Sinus rhythm.

This was a technically adequate study.

The left ventricular size is normal.   There is moderate concentric left ventricular hypertrophy.   O
verall left ventricular systolic function is normal with, an EF between 55 - 60 %.

The right ventricle is severely enlarged.

LA is moderately dilated 34-39 ml/m2

The right atrium is normal in size.

There is mild aortic valve sclerosis.   There is mild aortic stenosis present.   Peak/mean gradient a
cross the Aortic Valve is 22.28mmHg / 10.64mmHg.

Mild mitral annular calcification present.   Mild mitral regurgitation is present.

Mild tricuspid regurgitation present.   Right ventricular systolic pressure is normal at < 35 mmHg.

The pulmonic valve was not well visualized.   There is no pulmonic regurgitation present.

The aortic root size is normal.

Normal inferior vena cava with normal inspiratory collapse consistent with estimated right atrial pre
ssure of  5 mmHg.

There is no pericardial effusion.



CONCLUSIONS

--------

1. Sinus rhythm.

2. This was a technically adequate study.

3. The left ventricular size is normal.

4. There is moderate concentric left ventricular hypertrophy.

5. Overall left ventricular systolic function is normal with, an EF between 55 - 60 %.

6. The right ventricle is severely enlarged.

7. LA is moderately dilated 34-39 ml/m2

8. The right atrium is normal in size.

9. There is mild aortic valve sclerosis.

10. There is mild aortic stenosis present.

11. Peak/mean gradient across the Aortic Valve is 22.28mmHg / 10.64mmHg.

12. Mild mitral annular calcification present.

13. Mild mitral regurgitation is present.

14. Mild tricuspid regurgitation present.

15. Right ventricular systolic pressure is normal at < 35 mmHg.

16. The pulmonic valve was not well visualized.

17. There is no pulmonic regurgitation present.

18. The aortic root size is normal.

19. Normal inferior vena cava with normal inspiratory collapse consistent with estimated right atrial
 pressure of  5 mmHg.

20. There is no pericardial effusion.





SONOGRAPHER: Umm Driver RDCS

## 2018-11-23 NOTE — PN
PROGRESS NOTE



DATE OF SERVICE:

11/23/2018.



HISTORY:

This 68-year-old gentleman admitted with visual changes and possible TIA.  The patient

also had bilateral carotid stenosis also. Brain MRI showed nonspecific white matter

demyelination related to chronic small vessel ischemia. Dr. Bhakta and Neurology are

following the patient closely.  No chest pain.  No palpitations.  No fever.  A 2D echo

with Doppler showed ejection fraction about 50% to 60%. Mild valvular abnormalities.



EXAM:

Alert and oriented x2.  Pulse is 85, blood pressure 180/85, respirations 18,

temperature 97.4, pulse ox 97% on room air.

HEENT: Conjunctivae normal.

NECK: No JVD.

CARDIOVASCULAR: S1 and S2 muffled.

LUNGS: Breath sounds diminished at the bases. No rhonchi, no crackles.

ABDOMEN: Soft, nontender.

LEGS: No edema.

NERVOUS SYSTEM: No focal deficits.



LABS:

WBC 8.2, hemoglobin 12.1.  Accu-Cheks 776.



ASSESSMENT:

1. Diplopia, possible acute transient ischemic attack.

2. Bilateral carotid stenosis.

3. Increased creatinine, with possible chronic kidney stage III.

4. Coronary artery disease with coronary artery bypass grafting.

5. Diabetes mellitus type 2.

6. Hypertension.

7. History of degenerative joint disease.

8. History of urinary calculus.



RECOMMENDATIONS:

Recommend to continue current management and continue with monitoring and symptomatic

treatment. Continue with antiplatelet agents.  Continue with hyperlipidemic agent.  The

patient is on Lipitor and aspirin. Will also add small dose of beta blockers and

continue to monitor.  Guarded prognosis because of multiple consultations. Further

recommendations to follow. Dr. Bhakta's input appreciated.





MMKIL / IJN: [id number] / Job#: [id number]

## 2018-11-23 NOTE — P.CNNES
History of Present Illness


Consult date: 11/23/18


Requesting physician: Herb Larson


Reason for Consult: Vision Changes


Chief complaint: Vision Changes


History of Present Illness: 


Neurology is consulting on a 68 year old male for blurry vision overlapping 

vision.  Patient states that he had a one-time occurrence of visual changes 

that involved blurry vision with overlapping vision for approximately 90 

seconds to 3 minutes.  Patient states that when he covered one eye he had clear 

vision.  However when he attempted to use both eyes he had the noted changes.  

Patient stated he had never had an occurrence prior to this event.  Patient has 

denied any repeat occurrence since the initial event.  Patient presented at the 

ED for evaluation.  CT angiogram noted moderate plaque formation with more than 

75% stenosis of both internal carotid arteries.  No evidence of intracranial 

aneurysm or stenosis. CT brain noted mild atrophy.  No acute intracranial 

abnormality.  MRI brain noted nonspecific white matter demyelination may be 

related to chronic small vessel ischemia.  Age-related atrophy.  No abnormality 

evident to account for patient's symptoms.





On contact, patient was alert and oriented 3, resting in bed in no acute 

distress.








Review of Systems





systems not noted in HPI are negative





Past Medical History


Past Medical History: Coronary Artery Disease (CAD), Diabetes Mellitus, 

Hyperlipidemia, Hypertension, Osteoarthritis (OA), Prostate Disorder, Vascular 

Disorder


Additional Past Medical History / Comment(s): Hx: Urinary calculus, 

diverticulitis,.  EDEMA CELIO LEGS.  TINY WOUND rt  SHIN, (WAS TREATED IN WOUND 

CLINIC IN PAST) KEEPS DRESSING WITH MEDIHONEY, NO DRAINAGE.


History of Any Multi-Drug Resistant Organisms: None Reported


Past Surgical History: Bowel Resection, Cholecystectomy, Coronary Bypass/CABG, 

Heart Catheterization, Hernia Repair, Joint Replacement, Orthopedic Surgery


Additional Past Surgical History / Comment(s): ORIF Rt ankle, stent in abdomen.

  Left knee arthroscopy, Total lt knee replacement.  COLONOSCOPY.  CHRONrtSHIN 

WOUND.  CABG-9/14/2010


Past Anesthesia/Blood Transfusion Reactions: No Reported Reaction


Additional Past Anesthesia/Blood Transfusion Reaction / Comment(s): (ADOPTED)


Past Psychological History: No Psychological Hx Reported


Smoking Status: Never smoker


Past Alcohol Use History: None Reported


Additional Past Alcohol Use History / Comment(s): "many years," QUIT 1993, PLUS 

2nd hand smoke R/T JOB.


Past Drug Use History: None Reported





- Past Family History


  ** Mother


Family Medical History: Unable to Obtain


Additional Family Medical History / Comment(s): adopted





Medications and Allergies


 Home Medications











 Medication  Instructions  Recorded  Confirmed  Type


 


Allopurinol [Zyloprim] 300 mg PO DAILY 05/12/14 11/23/18 History


 


glipiZIDE [Glucotrol XL] 10 mg PO DAILY 05/12/14 11/23/18 History


 


Atorvastatin [Lipitor] 40 mg PO DAILY 11/12/18 11/23/18 History


 


Clopidogrel [Plavix] 75 mg PO DAILY 11/12/18 11/23/18 History


 


Tamsulosin HCl [Flomax] 0.4 mg PO DAILY 11/12/18 11/23/18 History


 


HYDROcodone/APAP 10-325MG [Norco 1 tab PO QID 11/23/18 11/23/18 History





]    


 


Metoprolol Tartrate [Lopressor] 25 mg PO BID 11/23/18 11/23/18 History


 


amLODIPine [Norvasc] 10 mg PO DAILY 11/23/18 11/23/18 History


 


metFORMIN HCL [Glucophage] 500 mg PO TID 11/23/18 11/23/18 History











 Allergies











Allergy/AdvReac Type Severity Reaction Status Date / Time


 


Penicillins Allergy  Itching Verified 11/22/18 16:12


 


Sulfa (Sulfonamide Allergy  Unknown Verified 11/22/18 16:12





Antibiotics)     














Physical Examination





- Vital Signs


Vital Signs: 


 Vital Signs











  Temp Pulse Pulse Resp BP BP Pulse Ox


 


 11/23/18 11:30  97.5 F L   67  18   162/71  97


 


 11/23/18 07:55  97.6 F   58 L  18   148/75  98


 


 11/23/18 04:06  97.0 F L   60  16   149/68  98


 


 11/23/18 04:00  97.0 F L   60  16   149/68  98


 


 11/23/18 00:00  97.4 F L   57 L  16   118/66  97


 


 11/22/18 23:54  97.8 F   56 L  16   118/72  97


 


 11/22/18 21:28  97.8 F   56 L  16   118/72  97


 


 11/22/18 19:54   57 L   16  131/80   99


 


 11/22/18 17:30   51 L   18  143/65   96


 


 11/22/18 17:00   57 L   20  125/54   97








 Intake and Output











 11/23/18 11/23/18 11/23/18





 06:59 14:59 22:59


 


Intake Total  1662 


 


Balance  1662 


 


Intake:   


 


  Intake, IV Titration  1200 





  Amount   


 


    Sodium Chloride 0.9% 1,  600 





    000 ml @ 75 mls/hr IV .   





    [id number] DAVID Rx#:[id number]   


 


    Sodium Chloride 0.9% 1,  600 





    000 ml @ 999 mls/hr IV .   





    Q1H1M STA Rx#:[id number]   


 


  Oral  462 


 


Other:   


 


  Voiding Method Toilet  





 Urinal  


 


  # Voids  1 


 


  Weight 90.718 kg  











General appearance: Alert & oriented x3, no apparent distress.


Head: Atraumatic, normocephalic, normal inspection


Eyes: PERRLA, EOMI.  Absent scleral icterus, conjunctival injection, nystagmus, 

periorbital swelling.


Ear, nose and throat: Normal exam, mucous membranes moist


Neck: Normal inspection, absent tenderness, lymphadenopathy.


Respiratory: No increased work of breathing


Cardiovascular: Regular rate, rhythm


GI/abdominal: No guarding, no rigidity


Extremities: moves all extremities


Neurological: 


cranial nerves II through XII intact


no lateralizing weakness


no seizure activity noted on physical exam


no pronator drift and no nystagmus.





Strength:


full in all 4 extremities, pain left upper extremity/shoulder rotator cuff 

injury


 


Sensation: 


Left lower extremity: normal


Right lower extremity: normal


Left upper extremity: normal 


Right upper extremity:normal 





Psychological: Mood and Affect appropriate for setting








Results


serum homocysteine level normal


Lipid panel within normal limits as noted








- Laboratory Findings


CBC and BMP: 


 11/23/18 06:15





 11/23/18 06:15


Abnormal Lab Findings: 


 Abnormal Labs











  11/22/18 11/22/18 11/22/18





  16:00 16:00 19:57


 


RBC  4.02 L  


 


Hgb   


 


Hct   


 


MCV   


 


Chloride   


 


BUN   28 H 


 


Creatinine   1.40 H 


 


Glucose   103 H 


 


POC Glucose (mg/dL)    149 H


 


Alkaline Phosphatase   130 H 


 


Urine Protein   


 


Urine Glucose (UA)   














  11/22/18 11/22/18 11/23/18





  19:58 21:24 06:15


 


RBC   


 


Hgb   


 


Hct   


 


MCV   


 


Chloride    110 H


 


BUN   


 


Creatinine   


 


Glucose    62 L


 


POC Glucose (mg/dL)   122 H 


 


Alkaline Phosphatase   


 


Urine Protein  Trace H  


 


Urine Glucose (UA)  Trace H  














  11/23/18 11/23/18 11/23/18





  06:15 11:35 16:40


 


RBC  3.68 L  


 


Hgb  12.1 L  


 


Hct  37.1 L  


 


MCV  100.8 H  


 


Chloride   


 


BUN   


 


Creatinine   


 


Glucose   


 


POC Glucose (mg/dL)   124 H  276 H


 


Alkaline Phosphatase   


 


Urine Protein   


 


Urine Glucose (UA)   














Assessment and Plan


(1) Transient cerebral ischemia


Current Visit: Yes   Status: Acute   Code(s): G45.9 - TRANSIENT CEREBRAL 

ISCHEMIC ATTACK, UNSPECIFIED   SNOMED Code(s): 340215123


   





(2) Vision changes


Current Visit: Yes   Status: Acute   Code(s): H53.9 - UNSPECIFIED VISUAL 

DISTURBANCE   SNOMED Code(s): 532750104


   





(3) Carotid stenosis


Current Visit: Yes   Status: Acute   Code(s): I65.29 - OCCLUSION AND STENOSIS 

OF UNSPECIFIED CAROTID ARTERY   SNOMED Code(s): 06686057


   


Plan: 


Patient's physical exam findings and symptoms along with current diagnostic 

workup results appear more consistent with TIA.  Patient does have noted 

carotid stenosis and symptoms resolved within only 1 time occurrence.  Patient 

is currently on aspirin therapy 325 mg daily.  Continue medication therapy as 

noted.  Although patient's lipid panel is within normal limits given the patient

's carotid stenosis, patient will need to maintain antihyperlipidemic 

medication as noted Lipitor at existing dose and frequency both inpatient and 

in the outpatient setting.  Vascular surgery is already on consult for the 

noted vascular changes. EEG is ordered. Neuro checks every shift.











neurology will continue to follow and provide updates as needed or warranted.








I have discussed the plan of care with the physician prior to implementation 

and he agrees with the plan as implemented.

## 2018-11-23 NOTE — HP
HISTORY AND PHYSICAL



DATE OF SERVICE:

11/22/2018



CHIEF COMPLAINT:

Visual changes.



HISTORY OF PRESENT ILLNESS:

This 68-year-old gentleman with a past medical history of multiple medical 
problems

including history of diabetes, hypertension, hyperlipidemia, history of DJD, 
history of

CAD, being followed by Dr. uRiz Fairbanks in the outpatient setting is complaining 
of

difficulty in vision.  This morning the patient had blurring of vision and 
double

vision, especially when looking to the right and the patient was found to have 
right

weakness, but which improved significantly after some time. Initial CAT scan of

the brain and CT angio also did not show acute abnormality.  There is no 
history of any

fever or rigors.  No history of headache, loss of consciousness, seizures at 
this time.

The carotid ultrasound showed a 75% stenosis of origin of the both internal 
carotid

arteries.



PAST MEDICAL HISTORY:

History of CAD, diabetes mellitus, hypertension, hyperlipidemia, history of DJD,

history of urinary calculus, bowel resection, CAD, CABG.



MEDICATIONS:

Medications prior to admission include home medications are:

1. Glipizide 10 mg p.o. daily.

2. Coenzyme Q10, 100 mg daily.

3. Flomax 0.4 daily.

4. Multivitamins 1 p.o. daily.

5. Lopressor 50 mg p.o. daily.

6. Norco  p.r.n.

7. Colace 100 mg p.o. daily.

8. Plavix 75 mg p.o. daily.

9. Lipitor 40 mg daily.

10.Aspirin 325 mg daily.

11.Zyloprim 300 mg daily.



ALLERGIES:

Allergies are PENICILLIN and SULFA.



FAMILY HISTORY:

The patient is adopted.



SOCIAL HISTORY:

Previous history of smoking and occasional alcohol intake.



REVIEW OF SYSTEMS:

ENT: As mentioned earlier.

CARDIOVASCULAR SYSTEM: No angina.

RESPIRATORY SYSTEM: No cough or hemoptysis.

GI: No nausea.

: No dysuria.

NERVOUS SYSTEM: As mentioned earlier.

ALLERGY/IMMUNOLOGY: No asthma or hayfever.

MUSCULOSKELETAL: As mentioned earlier.

HEMATOLOGY: No history of anemia.

ENDOCRINE: History of diabetes, no hypothyroidism.

CONSTITUTIONAL:  As mentioned early.

DERMATOLOGY:  Negative.

RHEUMATOLOGY: Negative.

PSYCHIATRY:  As mentioned earlier.



PHYSICAL EXAMINATION:

The patient is alert and oriented x3.  The pulse is 57, blood pressure 125/54,

respiration 20, temperature 97.4, pulse ox 98% on room air.

HEENT:  Conjunctivae normal.  Oral mucosa moist.

Neck is no jugular venous distention. No carotid bruit.  No lymph node 
enlargement.

CARDIOVASCULAR: S1 and S2 muffled.

RESPIRATORY: Breath sounds diminished at the bases. No rhonchi, no crackles.

ABDOMEN:  Soft, nontender.  No mass palpable.

LEGS:  No edema, no swelling.

NERVOUS SYSTEM: Higher functions as mentioned. Moves all 4 limbs. No focal motor

deficit.

LYMPHATICS:  No lymphadenopathy of the neck, axillae or groin.

SKIN:  No ulcer, rash or bleeding.

JOINTS: No active deforming arthropathy.



LABS:

CBC within normal limits.  Creatinine 1.40.



ASSESSMENT:

1. Diplopia possible acute transient ischemic attack.

2. Increased creatinine with possible chronic kidney disease stage 3.

3. Coronary artery disease, CABG.

4. Diabetes mellitus type 2.

5. Hypertension.

6. Hyperlipidemia.

7. History of degenerative joint disease.

8. History of urinary calculus.



RECOMMENDATION AND DISCUSSION:

This 68-year-old gentleman who presented with multiple complex medical issues. 
At this

time, I will monitor the patient closely.  Continue the current medications with

antiplatelet agents.  Neurology evaluation.  Full neurovascular workup.  I 
would also

order an MRI.  Otherwise home medications will be reconciled. Prognosis guarded 
because

of multiple complex medical issues. Further recommendations to follow. A 2-D 
echo with

Doppler has been ordered. I would also recommend a vascular consultation, also.





MMODL / IJN: [id number] / Job#: [id number]

MTDD

## 2018-11-23 NOTE — CONS
CONSULTATION



This is a 68-year-old gentleman who has been to admitted to [facility] with history of

some difficulty in bilateral visions.  According to the patient, patient has blurring

of the vision when he looks up, but no history of loss of vision.  This happened only

one time. After that patient had no further symptoms.  He does not give any history of

any motor weakness. He has a right rotator cuff, but no history of any weakness on the

right arm.  The patient had a CT scan of the brain which showed no evidence of

infarction noted.  The patient had a CT of the carotid which showed bilateral 75%

stenosis and 50% _____in left subclavian artery.  Vertebral arteries are patent and

intracranial vessels are visualized.



MEDICAL HISTORY:

History of coronary artery disease, history of diabetes mellitus, history of

hypertension, history of hyperlipidemia, history of kidney stone, surgical bowel

resection, history of CABG.



PERSONAL HISTORY:

Allergic to PENICILLIN and SULFA.



PHYSICAL EXAMINATION:

The patient was seen in his room.  He is in sitting position.  His vital signs are

stable.

Neck is supple.  No bruit appreciated.

Chest is clear on auscultation.  First and second sounds normal.

Abdomen is soft, nontender.

VASCULAR EXAMINATION:  Brachial, radial and femoral pulses are present.

CENTRAL NERVOUS SYSTEM: Patient oriented to time and place.  Patient has a very strong

motor function upper and lower extremities.



IMPRESSION:

Bilateral 75% stenosis _____, 50% left subclavian artery.  The patient is scheduled to

have an MRI done today and recommend to have neuro consult and follow with you.

Patient will be put on antiplatelet therapy.





MMODL / IJN: [id number] / Job#: [id number]

## 2018-11-24 VITALS — DIASTOLIC BLOOD PRESSURE: 71 MMHG | SYSTOLIC BLOOD PRESSURE: 147 MMHG | TEMPERATURE: 99 F | HEART RATE: 68 BPM

## 2018-11-24 LAB
ANION GAP SERPL CALC-SCNC: 8 MMOL/L
BASOPHILS # BLD AUTO: 0.1 K/UL (ref 0–0.2)
BASOPHILS NFR BLD AUTO: 1 %
BUN SERPL-SCNC: 20 MG/DL (ref 9–20)
CALCIUM SPEC-MCNC: 8.7 MG/DL (ref 8.4–10.2)
CHLORIDE SERPL-SCNC: 108 MMOL/L (ref 98–107)
CO2 SERPL-SCNC: 24 MMOL/L (ref 22–30)
EOSINOPHIL # BLD AUTO: 0.2 K/UL (ref 0–0.7)
EOSINOPHIL NFR BLD AUTO: 3 %
ERYTHROCYTE [DISTWIDTH] IN BLOOD BY AUTOMATED COUNT: 3.89 M/UL (ref 4.3–5.9)
ERYTHROCYTE [DISTWIDTH] IN BLOOD: 13.3 % (ref 11.5–15.5)
GLUCOSE BLD-MCNC: 112 MG/DL (ref 75–99)
GLUCOSE SERPL-MCNC: 115 MG/DL (ref 74–99)
HCT VFR BLD AUTO: 39.1 % (ref 39–53)
HGB BLD-MCNC: 12.9 GM/DL (ref 13–17.5)
LYMPHOCYTES # SPEC AUTO: 1.9 K/UL (ref 1–4.8)
LYMPHOCYTES NFR SPEC AUTO: 21 %
MCH RBC QN AUTO: 33.2 PG (ref 25–35)
MCHC RBC AUTO-ENTMCNC: 33.1 G/DL (ref 31–37)
MCV RBC AUTO: 100.5 FL (ref 80–100)
MONOCYTES # BLD AUTO: 0.7 K/UL (ref 0–1)
MONOCYTES NFR BLD AUTO: 7 %
NEUTROPHILS # BLD AUTO: 5.9 K/UL (ref 1.3–7.7)
NEUTROPHILS NFR BLD AUTO: 67 %
PLATELET # BLD AUTO: 236 K/UL (ref 150–450)
POTASSIUM SERPL-SCNC: 4.3 MMOL/L (ref 3.5–5.1)
SODIUM SERPL-SCNC: 140 MMOL/L (ref 137–145)
WBC # BLD AUTO: 8.8 K/UL (ref 3.8–10.6)

## 2018-11-24 RX ADMIN — ASPIRIN 325 MG ORAL TABLET SCH MG: 325 PILL ORAL at 08:34

## 2018-11-24 RX ADMIN — INSULIN ASPART SCH: 100 INJECTION, SOLUTION INTRAVENOUS; SUBCUTANEOUS at 06:31

## 2018-11-24 RX ADMIN — PANTOPRAZOLE SODIUM SCH: 40 TABLET, DELAYED RELEASE ORAL at 06:44

## 2018-11-24 RX ADMIN — HEPARIN SODIUM SCH: 5000 INJECTION, SOLUTION INTRAVENOUS; SUBCUTANEOUS at 08:30

## 2018-11-24 RX ADMIN — METOPROLOL TARTRATE SCH MG: 25 TABLET, FILM COATED ORAL at 08:34

## 2018-11-24 NOTE — EEG
ELECTROENCEPHALOGRAM REPORT



DATE OF SERVICE:

11/23/2018



REASON FOR TESTING:

Altered mental status.



DESCRIPTION OF THE PROCEDURE:

This EEG was performed using a 21 channel digital electroencephalograph, following

international 10-20 system.



DESCRIPTION OF THE RECORDING:

From the beginning of the tracing, and with patient's eyes closed, the background

rhythm was mostly consisting of 9 Hz alpha frequency in the posterior occipital leads.

No obvious asymmetry is seen.  Photic stimulation was performed with a minimal driving

response seen.  No pathological waves were elicited.  Hyperventilation was not

performed.  Rare movement artifacts are seen.  The patient remains awake throughout the

tracing.  No epileptiform discharges were seen.  His EKG lead showed a regular rate and

rhythm.



INTERPRETATION:

This awake EEG can be considered within normal limits.  There was no asymmetry seen.

No epileptiform discharges were noticed.  The absence of epileptiform discharges does

not rule out the diagnosis of epilepsy; therefore clinical correlation is recommended.





MMCELINE / IJDARIUS: [id number] / Job#: [id number]

## 2018-11-25 NOTE — DS
DISCHARGE SUMMARY



DATE OF SERVICE:

11/24/2018



FINAL DIAGNOSES:

1. Diplopia possible acute transient ischemic attack, rule out acute stroke.

2. Bilateral carotid artery stenosis.

3. Increased creatinine with possible chronic kidney stage III.

4. Coronary artery disease, coronary artery bypass grafting.

5. Diabetes type 2.

6. Hypertension.

7. Degenerative joint disease.

8. History of urinary calculi.



DISCHARGE DISPOSITION:

The patient left the hospital AGAINST MEDICAL ADVICE.



HISTORY OF PRESENT ILLNESS:

This 68-year-old gentleman with a past medical history of multiple medical 
problems was

admitted with multiple medical problems as mentioned earlier.  The patient seen 
by

vascular surgery and neurology but however the patient left the hospital AGAINST

MEDICAL ADVICE before complete medical .



The prognosis remained extremely guarded throughout hospitalization.  Please 
refer to

multiple progress notes, staff notes and further notes for further information.





MMODL / IJN: [id number] / Job#: [id number]

MTDD

## 2020-05-14 ENCOUNTER — HOSPITAL ENCOUNTER (OUTPATIENT)
Dept: HOSPITAL 47 - RADXRMAIN | Age: 70
Discharge: HOME | End: 2020-05-14
Attending: FAMILY MEDICINE
Payer: MEDICARE

## 2020-05-14 DIAGNOSIS — M99.71: Primary | ICD-10-CM

## 2020-05-14 DIAGNOSIS — M48.02: ICD-10-CM

## 2020-05-14 PROCEDURE — 72050 X-RAY EXAM NECK SPINE 4/5VWS: CPT

## 2020-05-14 NOTE — XR
EXAMINATION TYPE: XR cervical spine comp

 

DATE OF EXAM: 5/14/2020

 

TECHNIQUE: Frontal, lateral, oblique, swimmers, and open mouth view of the cervical spine are obtaine
d.

 

HISTORY:  M54.2

 

COMPARISON: CTA neck November 22, 2018

 

FINDINGS:  The cervical spine is only visualized to mid C7 level despite attempted swimmer's view due
 to osseous overlap. Suboptimal evaluation of C7-T1 level. Alignment stable and satisfactory. Osseous
 structures are demineralized. The pre-vertebral soft tissue appears within normal limits.  The C1-C2
 articulation is within normal limits on the open mouth view. Stable mild disc space narrowing C3-C4,
 C5-C6, and C6-C7 levels. Vertebral body heights maintained. The oblique images show uncovertebral fa
cet spurring causing bilateral neural foraminal narrowing at multiple levels. Prominent calcified kin
que in the left common and internal carotid artery noted. Old left midclavicular fracture seen. Overl
cory sternal wires partially imaged.

 

IMPRESSION: As above.

## 2020-05-21 ENCOUNTER — HOSPITAL ENCOUNTER (OUTPATIENT)
Dept: HOSPITAL 47 - RADXRMAIN | Age: 70
Discharge: HOME | End: 2020-05-21
Attending: FAMILY MEDICINE
Payer: MEDICARE

## 2020-05-21 DIAGNOSIS — L97.209: Primary | ICD-10-CM

## 2020-06-12 ENCOUNTER — HOSPITAL ENCOUNTER (OUTPATIENT)
Dept: HOSPITAL 47 - RADNMMAIN | Age: 70
Discharge: HOME | End: 2020-06-12
Attending: FAMILY MEDICINE
Payer: MEDICARE

## 2020-06-12 DIAGNOSIS — L97.209: ICD-10-CM

## 2020-06-12 DIAGNOSIS — R93.7: Primary | ICD-10-CM

## 2020-06-12 PROCEDURE — 78315 BONE IMAGING 3 PHASE: CPT

## 2020-06-14 NOTE — NM
EXAMINATION TYPE: NM bone 3 phase

 

DATE OF EXAM: 6/12/2020

 

COMPARISON: Right tibia/fibular radiographs 5/21/2020

 

HISTORY: 69-year-old male nonpressure chronic ulcer, L97.290, of the right calf. Patient reports a hi
story of prior left knee replacement 10 years ago. Aches and pains all over.

 

TECHNIQUE: Triple phase bone scintigraphy was performed following the injection of 23.9 mCi Tc 99m MD
P.  Immediate images and 3.5 hours post injection images acquired. Imaging was centered along the heladio
ateral legs. Delayed images included the entire body.

 

FINDINGS: 

Flow images show no significant asymmetry from side to side.

Pool images show focal increased soft tissue uptake along the medial aspect of the mid to distal thir
d right leg. When correlating with radiographs, soft tissues appear very thin in this region with pro
tuberant bone.

Delayed scan shows focal increased activity in this region as well. Additional focal increased activi
ty at the right lateral malleolus.

 

When evaluating the entire body, there is increased degenerative tracer activity at the shoulders, st
ernoclavicular joints, along the posterior elements of the cervical spine especially on the left, at 
the thoracolumbar junction, base of the thumbs, bilateral knees, left first MTP joint, and base of th
e left thumb. There is also foci of increased tracer activity involving multiple left-sided mid to lo
wer ribs in a linear distribution suggesting a posttraumatic etiology.

 

 

IMPRESSION: 

 

1. Two-phase bone scan abnormality along the medial aspect of the mid to distal third right leg at th
e site of hypertrophic chronic fracture deformity on radiograph. Based on these scintigraphic finding
s, unable to exclude osteomyelitis here.

 

2. Extensive degenerative tracer activity throughout the body as mentioned above. Additional foci of 
activity involving multiple left mid and lower ribs in a linear distribution suggesting a post trauma
tic etiology. This should be confirmed clinically.

## 2020-07-24 ENCOUNTER — HOSPITAL ENCOUNTER (OUTPATIENT)
Dept: HOSPITAL 47 - RADXRMAIN | Age: 70
End: 2020-07-24
Attending: ORTHOPAEDIC SURGERY
Payer: MEDICARE

## 2020-07-24 DIAGNOSIS — J84.89: Primary | ICD-10-CM

## 2020-07-24 PROCEDURE — 71046 X-RAY EXAM CHEST 2 VIEWS: CPT

## 2020-07-24 NOTE — XR
EXAMINATION TYPE: XR chest 2V

 

DATE OF EXAM: 7/24/2020

 

COMPARISON: 9/18/2010

 

TECHNIQUE: PA and lateral views submitted.

 

HISTORY: Pre-MRI

 

FINDINGS:

The lungs are clear and  there is no pneumothorax, pleural effusion, or focal pneumonia.  Postsurgica
l changes are seen and there is findings suggestive of coarsened interstitium and probable chronic in
terstitial lung disease. Arthropathy of the shoulders with remote trauma the left clavicle and left r
ib cage. No diagnostic evidence of epicardial lead.

 

IMPRESSION: 

1. No evidence of epicardial lead.

2. Coarsened interstitium correlate for chronic interstitial lung disease or chronic congestion. Inte
rstitial pneumonitis also in the differential diagnosis.

## 2020-08-17 ENCOUNTER — HOSPITAL ENCOUNTER (OUTPATIENT)
Dept: HOSPITAL 47 - RADXRMAIN | Age: 70
Discharge: HOME | End: 2020-08-17
Attending: ORTHOPAEDIC SURGERY
Payer: COMMERCIAL

## 2020-08-17 DIAGNOSIS — Z79.01: ICD-10-CM

## 2020-08-17 DIAGNOSIS — E11.9: ICD-10-CM

## 2020-08-17 DIAGNOSIS — M48.02: ICD-10-CM

## 2020-08-17 DIAGNOSIS — Z01.818: Primary | ICD-10-CM

## 2020-08-17 LAB
ALBUMIN SERPL-MCNC: 4.1 G/DL (ref 3.5–5)
ALP SERPL-CCNC: 168 U/L (ref 38–126)
ALT SERPL-CCNC: 38 U/L (ref 4–49)
ANION GAP SERPL CALC-SCNC: 7 MMOL/L
APTT BLD: 24.4 SEC (ref 22–30)
AST SERPL-CCNC: 26 U/L (ref 17–59)
BASOPHILS # BLD AUTO: 0.1 K/UL (ref 0–0.2)
BASOPHILS NFR BLD AUTO: 1 %
BUN SERPL-SCNC: 18 MG/DL (ref 9–20)
CALCIUM SPEC-MCNC: 9.3 MG/DL (ref 8.4–10.2)
CHLORIDE SERPL-SCNC: 105 MMOL/L (ref 98–107)
CO2 SERPL-SCNC: 26 MMOL/L (ref 22–30)
EOSINOPHIL # BLD AUTO: 0.1 K/UL (ref 0–0.7)
EOSINOPHIL NFR BLD AUTO: 2 %
ERYTHROCYTE [DISTWIDTH] IN BLOOD BY AUTOMATED COUNT: 4.05 M/UL (ref 4.3–5.9)
ERYTHROCYTE [DISTWIDTH] IN BLOOD: 13.8 % (ref 11.5–15.5)
GLUCOSE SERPL-MCNC: 135 MG/DL (ref 74–99)
HCT VFR BLD AUTO: 41.5 % (ref 39–53)
HGB BLD-MCNC: 12.9 GM/DL (ref 13–17.5)
INR PPP: 1 (ref ?–1.2)
LYMPHOCYTES # SPEC AUTO: 1.5 K/UL (ref 1–4.8)
LYMPHOCYTES NFR SPEC AUTO: 19 %
MCH RBC QN AUTO: 31.8 PG (ref 25–35)
MCHC RBC AUTO-ENTMCNC: 31 G/DL (ref 31–37)
MCV RBC AUTO: 102.6 FL (ref 80–100)
MONOCYTES # BLD AUTO: 0.5 K/UL (ref 0–1)
MONOCYTES NFR BLD AUTO: 6 %
NEUTROPHILS # BLD AUTO: 5.3 K/UL (ref 1.3–7.7)
NEUTROPHILS NFR BLD AUTO: 70 %
PLATELET # BLD AUTO: 239 K/UL (ref 150–450)
POTASSIUM SERPL-SCNC: 4.8 MMOL/L (ref 3.5–5.1)
PROT SERPL-MCNC: 7 G/DL (ref 6.3–8.2)
PT BLD: 10.4 SEC (ref 9–12)
SODIUM SERPL-SCNC: 138 MMOL/L (ref 137–145)
WBC # BLD AUTO: 7.6 K/UL (ref 3.8–10.6)

## 2020-08-17 PROCEDURE — 85730 THROMBOPLASTIN TIME PARTIAL: CPT

## 2020-08-17 PROCEDURE — 71046 X-RAY EXAM CHEST 2 VIEWS: CPT

## 2020-08-17 PROCEDURE — 85610 PROTHROMBIN TIME: CPT

## 2020-08-17 PROCEDURE — 80053 COMPREHEN METABOLIC PANEL: CPT

## 2020-08-17 PROCEDURE — 85025 COMPLETE CBC W/AUTO DIFF WBC: CPT

## 2020-08-17 NOTE — XR
EXAMINATION TYPE: XR chest 2V

 

DATE OF EXAM: 8/17/2020

 

CLINICAL HISTORY: Presurgical. History of cardiac surgery. 

 

TECHNIQUE:  Frontal and lateral views of the chest are obtained.

 

COMPARISON: 7/24/2020 chest radiograph

 

FINDINGS:  Sternotomy wires. The cardiomediastinal silhouette is within normal limits for size. Pulmo
nary vasculature is normal. Unchanged interstitial coarsening. There is no focal air space opacity, p
leural effusion, or pneumothorax seen. Old left-sided rib and clavicle deformities.

 

IMPRESSION:  

Unchanged radiographic appearance of the chest versus 7/24/2020 comparison, with coarsened interstiti
al lung markings.

## 2020-08-24 ENCOUNTER — HOSPITAL ENCOUNTER (OUTPATIENT)
Dept: HOSPITAL 47 - OR | Age: 70
Discharge: HOME | End: 2020-08-24
Attending: ORTHOPAEDIC SURGERY
Payer: MEDICARE

## 2020-08-24 VITALS
TEMPERATURE: 97.5 F | RESPIRATION RATE: 18 BRPM | HEART RATE: 79 BPM | DIASTOLIC BLOOD PRESSURE: 67 MMHG | SYSTOLIC BLOOD PRESSURE: 136 MMHG

## 2020-08-24 VITALS — BODY MASS INDEX: 25.8 KG/M2

## 2020-08-24 DIAGNOSIS — Z87.891: ICD-10-CM

## 2020-08-24 DIAGNOSIS — K21.9: ICD-10-CM

## 2020-08-24 DIAGNOSIS — Z88.2: ICD-10-CM

## 2020-08-24 DIAGNOSIS — M50.11: ICD-10-CM

## 2020-08-24 DIAGNOSIS — E78.5: ICD-10-CM

## 2020-08-24 DIAGNOSIS — M10.9: ICD-10-CM

## 2020-08-24 DIAGNOSIS — I25.10: ICD-10-CM

## 2020-08-24 DIAGNOSIS — H91.92: ICD-10-CM

## 2020-08-24 DIAGNOSIS — Z79.899: ICD-10-CM

## 2020-08-24 DIAGNOSIS — M43.12: ICD-10-CM

## 2020-08-24 DIAGNOSIS — I25.2: ICD-10-CM

## 2020-08-24 DIAGNOSIS — M48.02: ICD-10-CM

## 2020-08-24 DIAGNOSIS — Z88.0: ICD-10-CM

## 2020-08-24 DIAGNOSIS — Z87.19: ICD-10-CM

## 2020-08-24 DIAGNOSIS — E11.42: ICD-10-CM

## 2020-08-24 DIAGNOSIS — I10: ICD-10-CM

## 2020-08-24 DIAGNOSIS — Z87.442: ICD-10-CM

## 2020-08-24 DIAGNOSIS — M86.60: ICD-10-CM

## 2020-08-24 DIAGNOSIS — Z95.1: ICD-10-CM

## 2020-08-24 DIAGNOSIS — F41.9: ICD-10-CM

## 2020-08-24 DIAGNOSIS — M19.90: ICD-10-CM

## 2020-08-24 DIAGNOSIS — Z96.652: ICD-10-CM

## 2020-08-24 DIAGNOSIS — Z98.890: ICD-10-CM

## 2020-08-24 DIAGNOSIS — M50.01: Primary | ICD-10-CM

## 2020-08-24 DIAGNOSIS — Z79.82: ICD-10-CM

## 2020-08-24 DIAGNOSIS — J44.9: ICD-10-CM

## 2020-08-24 DIAGNOSIS — Z79.84: ICD-10-CM

## 2020-08-24 LAB
GLUCOSE BLD-MCNC: 132 MG/DL (ref 75–99)
GLUCOSE BLD-MCNC: 158 MG/DL (ref 75–99)

## 2020-08-24 PROCEDURE — 22845 INSERT SPINE FIXATION DEVICE: CPT

## 2020-08-24 PROCEDURE — 86901 BLOOD TYPING SEROLOGIC RH(D): CPT

## 2020-08-24 PROCEDURE — 20930 SP BONE ALGRFT MORSEL ADD-ON: CPT

## 2020-08-24 PROCEDURE — 72040 X-RAY EXAM NECK SPINE 2-3 VW: CPT

## 2020-08-24 PROCEDURE — 22551 ARTHRD ANT NTRBDY CERVICAL: CPT

## 2020-08-24 PROCEDURE — 86850 RBC ANTIBODY SCREEN: CPT

## 2020-08-24 PROCEDURE — 86900 BLOOD TYPING SEROLOGIC ABO: CPT

## 2020-08-24 NOTE — XR
EXAMINATION TYPE: XR cervical spine limited, FL guidance operating room

 

DATE OF EXAM: 8/24/2020

 

COMPARISON: NONE

 

HISTORY: Needle and hardware placement for cervical fixation

 

TECHNIQUE: Fluoroscopy.

 

FINDINGS:  Fluoroscopic guidance was provided during procedure performed by Dr. Tran.  A total of 3 
seconds of fluoroscopic time was utilized during the procedure and 3 spot images was acquired. Please
 see operative report for additional details.

 

IMPRESSION:  As Above.
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
## 2020-10-05 ENCOUNTER — HOSPITAL ENCOUNTER (OUTPATIENT)
Dept: HOSPITAL 47 - CATHCVL | Age: 70
End: 2020-10-05
Attending: RADIOLOGY
Payer: MEDICARE

## 2020-10-05 VITALS — BODY MASS INDEX: 25.8 KG/M2

## 2020-10-05 VITALS — DIASTOLIC BLOOD PRESSURE: 75 MMHG | HEART RATE: 68 BPM | SYSTOLIC BLOOD PRESSURE: 173 MMHG | RESPIRATION RATE: 18 BRPM

## 2020-10-05 DIAGNOSIS — L97.812: Primary | ICD-10-CM

## 2020-10-05 DIAGNOSIS — A49.02: ICD-10-CM

## 2020-10-05 DIAGNOSIS — M86.261: ICD-10-CM

## 2020-10-05 LAB
ANION GAP SERPL CALC-SCNC: 8 MMOL/L
BASOPHILS # BLD AUTO: 0.1 K/UL (ref 0–0.2)
BASOPHILS NFR BLD AUTO: 1 %
BUN SERPL-SCNC: 20 MG/DL (ref 9–20)
CHLORIDE SERPL-SCNC: 108 MMOL/L (ref 98–107)
CO2 SERPL-SCNC: 22 MMOL/L (ref 22–30)
EOSINOPHIL # BLD AUTO: 0.2 K/UL (ref 0–0.7)
EOSINOPHIL NFR BLD AUTO: 3 %
ERYTHROCYTE [DISTWIDTH] IN BLOOD BY AUTOMATED COUNT: 4.27 M/UL (ref 4.3–5.9)
ERYTHROCYTE [DISTWIDTH] IN BLOOD: 14.4 % (ref 11.5–15.5)
GLUCOSE BLD-MCNC: 125 MG/DL (ref 75–99)
HCT VFR BLD AUTO: 42.3 % (ref 39–53)
HGB BLD-MCNC: 13.3 GM/DL (ref 13–17.5)
LYMPHOCYTES # SPEC AUTO: 1.4 K/UL (ref 1–4.8)
LYMPHOCYTES NFR SPEC AUTO: 21 %
MCH RBC QN AUTO: 31.2 PG (ref 25–35)
MCHC RBC AUTO-ENTMCNC: 31.5 G/DL (ref 31–37)
MCV RBC AUTO: 99.1 FL (ref 80–100)
MONOCYTES # BLD AUTO: 0.4 K/UL (ref 0–1)
MONOCYTES NFR BLD AUTO: 6 %
NEUTROPHILS # BLD AUTO: 4.5 K/UL (ref 1.3–7.7)
NEUTROPHILS NFR BLD AUTO: 67 %
PLATELET # BLD AUTO: 260 K/UL (ref 150–450)
POTASSIUM SERPL-SCNC: 5.5 MMOL/L (ref 3.5–5.1)
SODIUM SERPL-SCNC: 138 MMOL/L (ref 137–145)
WBC # BLD AUTO: 6.6 K/UL (ref 3.8–10.6)

## 2020-10-05 PROCEDURE — 80051 ELECTROLYTE PANEL: CPT

## 2020-10-05 PROCEDURE — 85025 COMPLETE CBC W/AUTO DIFF WBC: CPT

## 2020-10-05 PROCEDURE — 82565 ASSAY OF CREATININE: CPT

## 2020-10-05 PROCEDURE — 36573 INSJ PICC RS&I 5 YR+: CPT

## 2020-10-05 PROCEDURE — 84520 ASSAY OF UREA NITROGEN: CPT

## 2020-10-05 NOTE — IR
EXAMINATION TYPE: IR cvc insert >=5 years

 

DATE OF EXAM: 10/5/2020

 

COMPARISON: NONE

 

CLINICAL HISTORY: Flexion Needs long-term intravenous access for antibiotics.

 

PROCEDURE: Hand hygiene obtained with soap and water and alcohol-based hand rub.

After informed consent, the skin overlying the right brachial vein was localized with ultrasound and 
noted to be compressible and patent.  An ultrasound image was obtained and submitted on the patient's
 chart.  The overlying skin was prepped and draped and Lidocaine was used for local anesthesia.  A sk
in bertha was made with a scalpel.  Access was gained to the vein under ultrasound guidance with a 21 g
auge needle and a 0.018 inch wire was advanced.  Access site was dilated with Peel-Away sheath and ca
theter tailored to the appropriate length and advanced such that the distal tip is at the cavoatrial 
junction.  Spot image was obtained verifying placement.  Catheter was fixed to the skin and a sterile
 dressing was placed following hemostasis.  Catheter was aspirated and flushed with saline.  Patient 
was discharged in stable condition without complication.Maximal barrier technique is utilized.  Ultra
sound image is documented on the chart. Ultrasound used with sterile technique. 

 

Fluoro time and fluoroscopic images submitted to document procedure: 37 intraoperative images, 0.2 mi
nutes fluoroscopy time

 

IMPRESSION: STATUS POST ULTRASOUND AND FLUOROSCOPIC GUIDED PICC LINE PLACEMENT, READY FOR USE.  THIS 
PROCEDURE WAS PERFORMED BY THE UNDERSIGNED.
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
## 2024-07-10 ENCOUNTER — HOSPITAL ENCOUNTER (INPATIENT)
Dept: HOSPITAL 47 - EC | Age: 74
LOS: 19 days | Discharge: HOSPICE | DRG: 917 | End: 2024-07-29
Attending: INTERNAL MEDICINE | Admitting: INTERNAL MEDICINE
Payer: MEDICARE

## 2024-07-10 VITALS — BODY MASS INDEX: 27.6 KG/M2

## 2024-07-10 DIAGNOSIS — Z98.1: ICD-10-CM

## 2024-07-10 DIAGNOSIS — E86.0: ICD-10-CM
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
## 2024-07-10 DIAGNOSIS — R65.20: ICD-10-CM

## 2024-07-10 DIAGNOSIS — E78.5: ICD-10-CM

## 2024-07-10 DIAGNOSIS — Z87.820: ICD-10-CM

## 2024-07-10 DIAGNOSIS — T40.412A: Primary | ICD-10-CM

## 2024-07-10 DIAGNOSIS — T84.622A: ICD-10-CM

## 2024-07-10 DIAGNOSIS — Z95.1: ICD-10-CM

## 2024-07-10 DIAGNOSIS — E11.69: ICD-10-CM

## 2024-07-10 DIAGNOSIS — R62.7: ICD-10-CM

## 2024-07-10 DIAGNOSIS — I25.5: ICD-10-CM

## 2024-07-10 DIAGNOSIS — F32.A: ICD-10-CM

## 2024-07-10 DIAGNOSIS — Z79.899: ICD-10-CM

## 2024-07-10 DIAGNOSIS — I25.10: ICD-10-CM

## 2024-07-10 DIAGNOSIS — J69.0: ICD-10-CM

## 2024-07-10 DIAGNOSIS — R15.9: ICD-10-CM

## 2024-07-10 DIAGNOSIS — I11.0: ICD-10-CM

## 2024-07-10 DIAGNOSIS — Z66: ICD-10-CM

## 2024-07-10 DIAGNOSIS — L03.115: ICD-10-CM

## 2024-07-10 DIAGNOSIS — Z91.198: ICD-10-CM

## 2024-07-10 DIAGNOSIS — M86.8X6: ICD-10-CM

## 2024-07-10 DIAGNOSIS — J15.212: ICD-10-CM

## 2024-07-10 DIAGNOSIS — R79.89: ICD-10-CM

## 2024-07-10 DIAGNOSIS — Z62.29: ICD-10-CM

## 2024-07-10 DIAGNOSIS — Z63.4: ICD-10-CM

## 2024-07-10 DIAGNOSIS — Z87.891: ICD-10-CM

## 2024-07-10 DIAGNOSIS — I08.0: ICD-10-CM

## 2024-07-10 DIAGNOSIS — Z96.652: ICD-10-CM

## 2024-07-10 DIAGNOSIS — Z51.5: ICD-10-CM

## 2024-07-10 DIAGNOSIS — G92.8: ICD-10-CM

## 2024-07-10 DIAGNOSIS — Z88.2: ICD-10-CM

## 2024-07-10 DIAGNOSIS — E11.65: ICD-10-CM

## 2024-07-10 DIAGNOSIS — J96.01: ICD-10-CM

## 2024-07-10 DIAGNOSIS — Z53.29: ICD-10-CM

## 2024-07-10 DIAGNOSIS — I50.23: ICD-10-CM

## 2024-07-10 DIAGNOSIS — R04.2: ICD-10-CM

## 2024-07-10 DIAGNOSIS — Z91.51: ICD-10-CM

## 2024-07-10 DIAGNOSIS — Z79.84: ICD-10-CM

## 2024-07-10 DIAGNOSIS — Z86.14: ICD-10-CM

## 2024-07-10 DIAGNOSIS — Z88.0: ICD-10-CM

## 2024-07-10 DIAGNOSIS — I63.232: ICD-10-CM

## 2024-07-10 DIAGNOSIS — I27.22: ICD-10-CM

## 2024-07-10 DIAGNOSIS — E87.5: ICD-10-CM

## 2024-07-10 DIAGNOSIS — E11.628: ICD-10-CM

## 2024-07-10 DIAGNOSIS — Z91.128: ICD-10-CM

## 2024-07-10 DIAGNOSIS — R26.2: ICD-10-CM

## 2024-07-10 DIAGNOSIS — Z77.22: ICD-10-CM

## 2024-07-10 DIAGNOSIS — I45.10: ICD-10-CM

## 2024-07-10 DIAGNOSIS — L97.216: ICD-10-CM

## 2024-07-10 DIAGNOSIS — I76: ICD-10-CM

## 2024-07-10 DIAGNOSIS — R41.89: ICD-10-CM

## 2024-07-10 DIAGNOSIS — B96.6: ICD-10-CM

## 2024-07-10 DIAGNOSIS — A41.9: ICD-10-CM

## 2024-07-10 DIAGNOSIS — Z88.1: ICD-10-CM

## 2024-07-10 DIAGNOSIS — N17.0: ICD-10-CM

## 2024-07-10 DIAGNOSIS — M10.9: ICD-10-CM

## 2024-07-10 DIAGNOSIS — E87.20: ICD-10-CM

## 2024-07-10 DIAGNOSIS — T50.916A: ICD-10-CM

## 2024-07-10 DIAGNOSIS — R29.700: ICD-10-CM

## 2024-07-10 LAB
ALBUMIN SERPL-MCNC: 3.9 G/DL (ref 3.5–5)
ALP SERPL-CCNC: 105 U/L (ref 38–126)
ALT SERPL-CCNC: 26 U/L (ref 4–49)
ANION GAP SERPL CALC-SCNC: 12 MMOL/L
ANION GAP SERPL CALC-SCNC: 9 MMOL/L
APAP SPEC-MCNC: 34.4 UG/ML
APTT BLD: 27.2 SEC (ref 22–30)
AST SERPL-CCNC: 38 U/L (ref 17–59)
BASOPHILS # BLD AUTO: 0 K/UL (ref 0–0.2)
BASOPHILS NFR BLD AUTO: 0 %
BUN SERPL-SCNC: 32 MG/DL (ref 9–20)
BUN SERPL-SCNC: 35 MG/DL (ref 9–20)
CALCIUM SPEC-MCNC: 8.1 MG/DL (ref 8.4–10.2)
CALCIUM SPEC-MCNC: 8.3 MG/DL (ref 8.4–10.2)
CHLORIDE SERPL-SCNC: 113 MMOL/L (ref 98–107)
CHLORIDE SERPL-SCNC: 114 MMOL/L (ref 98–107)
CO2 BLDA-SCNC: 18 MMOL/L (ref 19–24)
CO2 SERPL-SCNC: 14 MMOL/L (ref 22–30)
CO2 SERPL-SCNC: 16 MMOL/L (ref 22–30)
EOSINOPHIL # BLD AUTO: 0.2 K/UL (ref 0–0.7)
EOSINOPHIL NFR BLD AUTO: 1 %
ERYTHROCYTE [DISTWIDTH] IN BLOOD BY AUTOMATED COUNT: 3.29 M/UL (ref 4.3–5.9)
ERYTHROCYTE [DISTWIDTH] IN BLOOD: 14.9 % (ref 11.5–15.5)
GLUCOSE BLD-MCNC: 131 MG/DL (ref 70–110)
GLUCOSE BLD-MCNC: 153 MG/DL (ref 70–110)
GLUCOSE BLD-MCNC: 179 MG/DL (ref 70–110)
GLUCOSE BLD-MCNC: 193 MG/DL (ref 70–110)
GLUCOSE BLD-MCNC: 281 MG/DL (ref 70–110)
GLUCOSE SERPL-MCNC: 168 MG/DL (ref 74–99)
GLUCOSE SERPL-MCNC: 202 MG/DL (ref 74–99)
HCO3 BLDA-SCNC: 17 MMOL/L (ref 21–25)
HCT VFR BLD AUTO: 35.7 % (ref 39–53)
HGB BLD-MCNC: 10.9 GM/DL (ref 13–17.5)
INR PPP: 1 (ref ?–1.2)
LYMPHOCYTES # SPEC AUTO: 0.4 K/UL (ref 1–4.8)
LYMPHOCYTES NFR SPEC AUTO: 2 %
MCH RBC QN AUTO: 33.1 PG (ref 25–35)
MCHC RBC AUTO-ENTMCNC: 30.4 G/DL (ref 31–37)
MCV RBC AUTO: 108.8 FL (ref 80–100)
MONOCYTES # BLD AUTO: 1 K/UL (ref 0–1)
MONOCYTES NFR BLD AUTO: 5 %
NEUTROPHILS # BLD AUTO: 18.2 K/UL (ref 1.3–7.7)
NEUTROPHILS NFR BLD AUTO: 91 %
PCO2 BLDA: 45 MMHG (ref 35–45)
PH BLDA: 7.19 [PH] (ref 7.35–7.45)
PH UR: 5 [PH] (ref 5–8)
PLATELET # BLD AUTO: 255 K/UL (ref 150–450)
PO2 BLDA: 49 MMHG (ref 83–108)
POTASSIUM SERPL-SCNC: 7.2 MMOL/L (ref 3.5–5.1)
POTASSIUM SERPL-SCNC: 7.3 MMOL/L (ref 3.5–5.1)
PROT SERPL-MCNC: 7.6 G/DL (ref 6.3–8.2)
PT BLD: 10.8 SEC (ref 10–12.5)
SALICYLATES SERPL-MCNC: <1 MG/DL
SODIUM SERPL-SCNC: 139 MMOL/L (ref 137–145)
SODIUM SERPL-SCNC: 139 MMOL/L (ref 137–145)
SP GR UR: 1.02 (ref 1–1.03)
UROBILINOGEN UR QL STRIP: <2 MG/DL (ref ?–2)
WBC # BLD AUTO: 19.9 K/UL (ref 3.8–10.6)

## 2024-07-10 PROCEDURE — 71045 X-RAY EXAM CHEST 1 VIEW: CPT

## 2024-07-10 PROCEDURE — 80048 BASIC METABOLIC PNL TOTAL CA: CPT

## 2024-07-10 PROCEDURE — 87116 MYCOBACTERIA CULTURE: CPT

## 2024-07-10 PROCEDURE — 93306 TTE W/DOPPLER COMPLETE: CPT

## 2024-07-10 PROCEDURE — 86850 RBC ANTIBODY SCREEN: CPT

## 2024-07-10 PROCEDURE — 86140 C-REACTIVE PROTEIN: CPT

## 2024-07-10 PROCEDURE — 96376 TX/PRO/DX INJ SAME DRUG ADON: CPT

## 2024-07-10 PROCEDURE — 88305 TISSUE EXAM BY PATHOLOGIST: CPT

## 2024-07-10 PROCEDURE — 85730 THROMBOPLASTIN TIME PARTIAL: CPT

## 2024-07-10 PROCEDURE — 87498 ENTEROVIRUS PROBE&REVRS TRNS: CPT

## 2024-07-10 PROCEDURE — 80143 DRUG ASSAY ACETAMINOPHEN: CPT

## 2024-07-10 PROCEDURE — 85027 COMPLETE CBC AUTOMATED: CPT

## 2024-07-10 PROCEDURE — 36410 VNPNXR 3YR/> PHY/QHP DX/THER: CPT

## 2024-07-10 PROCEDURE — 87502 INFLUENZA DNA AMP PROBE: CPT

## 2024-07-10 PROCEDURE — 87206 SMEAR FLUORESCENT/ACID STAI: CPT

## 2024-07-10 PROCEDURE — 85025 COMPLETE CBC W/AUTO DIFF WBC: CPT

## 2024-07-10 PROCEDURE — 86901 BLOOD TYPING SEROLOGIC RH(D): CPT

## 2024-07-10 PROCEDURE — 94003 VENT MGMT INPAT SUBQ DAY: CPT

## 2024-07-10 PROCEDURE — 96375 TX/PRO/DX INJ NEW DRUG ADDON: CPT

## 2024-07-10 PROCEDURE — 84443 ASSAY THYROID STIM HORMONE: CPT

## 2024-07-10 PROCEDURE — 87102 FUNGUS ISOLATION CULTURE: CPT

## 2024-07-10 PROCEDURE — 87075 CULTR BACTERIA EXCEPT BLOOD: CPT

## 2024-07-10 PROCEDURE — 87077 CULTURE AEROBIC IDENTIFY: CPT

## 2024-07-10 PROCEDURE — 70553 MRI BRAIN STEM W/O & W/DYE: CPT

## 2024-07-10 PROCEDURE — 82533 TOTAL CORTISOL: CPT

## 2024-07-10 PROCEDURE — 87798 DETECT AGENT NOS DNA AMP: CPT

## 2024-07-10 PROCEDURE — 82607 VITAMIN B-12: CPT

## 2024-07-10 PROCEDURE — 88108 CYTOPATH CONCENTRATE TECH: CPT

## 2024-07-10 PROCEDURE — 94640 AIRWAY INHALATION TREATMENT: CPT

## 2024-07-10 PROCEDURE — 82747 ASSAY OF FOLIC ACID RBC: CPT

## 2024-07-10 PROCEDURE — 82140 ASSAY OF AMMONIA: CPT

## 2024-07-10 PROCEDURE — 87205 SMEAR GRAM STAIN: CPT

## 2024-07-10 PROCEDURE — 80306 DRUG TEST PRSMV INSTRMNT: CPT

## 2024-07-10 PROCEDURE — 80202 ASSAY OF VANCOMYCIN: CPT

## 2024-07-10 PROCEDURE — 87186 SC STD MICRODIL/AGAR DIL: CPT

## 2024-07-10 PROCEDURE — 70450 CT HEAD/BRAIN W/O DYE: CPT

## 2024-07-10 PROCEDURE — 83735 ASSAY OF MAGNESIUM: CPT

## 2024-07-10 PROCEDURE — 82565 ASSAY OF CREATININE: CPT

## 2024-07-10 PROCEDURE — 89050 BODY FLUID CELL COUNT: CPT

## 2024-07-10 PROCEDURE — 83880 ASSAY OF NATRIURETIC PEPTIDE: CPT

## 2024-07-10 PROCEDURE — 81003 URINALYSIS AUTO W/O SCOPE: CPT

## 2024-07-10 PROCEDURE — 87635 SARS-COV-2 COVID-19 AMP PRB: CPT

## 2024-07-10 PROCEDURE — 36415 COLL VENOUS BLD VENIPUNCTURE: CPT

## 2024-07-10 PROCEDURE — 82805 BLOOD GASES W/O2 SATURATION: CPT

## 2024-07-10 PROCEDURE — 76937 US GUIDE VASCULAR ACCESS: CPT

## 2024-07-10 PROCEDURE — 70496 CT ANGIOGRAPHY HEAD: CPT

## 2024-07-10 PROCEDURE — 95816 EEG AWAKE AND DROWSY: CPT

## 2024-07-10 PROCEDURE — 99291 CRITICAL CARE FIRST HOUR: CPT

## 2024-07-10 PROCEDURE — 83036 HEMOGLOBIN GLYCOSYLATED A1C: CPT

## 2024-07-10 PROCEDURE — 84484 ASSAY OF TROPONIN QUANT: CPT

## 2024-07-10 PROCEDURE — 94660 CPAP INITIATION&MGMT: CPT

## 2024-07-10 PROCEDURE — 87040 BLOOD CULTURE FOR BACTERIA: CPT

## 2024-07-10 PROCEDURE — 87634 RSV DNA/RNA AMP PROBE: CPT

## 2024-07-10 PROCEDURE — 80061 LIPID PANEL: CPT

## 2024-07-10 PROCEDURE — 84132 ASSAY OF SERUM POTASSIUM: CPT

## 2024-07-10 PROCEDURE — 96374 THER/PROPH/DIAG INJ IV PUSH: CPT

## 2024-07-10 PROCEDURE — 84145 PROCALCITONIN (PCT): CPT

## 2024-07-10 PROCEDURE — 70498 CT ANGIOGRAPHY NECK: CPT

## 2024-07-10 PROCEDURE — 85652 RBC SED RATE AUTOMATED: CPT

## 2024-07-10 PROCEDURE — 93880 EXTRACRANIAL BILAT STUDY: CPT

## 2024-07-10 PROCEDURE — 94760 N-INVAS EAR/PLS OXIMETRY 1: CPT

## 2024-07-10 PROCEDURE — 87496 CYTOMEG DNA AMP PROBE: CPT

## 2024-07-10 PROCEDURE — 93005 ELECTROCARDIOGRAM TRACING: CPT

## 2024-07-10 PROCEDURE — 80053 COMPREHEN METABOLIC PANEL: CPT

## 2024-07-10 PROCEDURE — 85610 PROTHROMBIN TIME: CPT

## 2024-07-10 PROCEDURE — 80320 DRUG SCREEN QUANTALCOHOLS: CPT

## 2024-07-10 PROCEDURE — 94002 VENT MGMT INPAT INIT DAY: CPT

## 2024-07-10 PROCEDURE — 86900 BLOOD TYPING SEROLOGIC ABO: CPT

## 2024-07-10 PROCEDURE — 87529 HSV DNA AMP PROBE: CPT

## 2024-07-10 PROCEDURE — 87070 CULTURE OTHR SPECIMN AEROBIC: CPT

## 2024-07-10 PROCEDURE — 36600 WITHDRAWAL OF ARTERIAL BLOOD: CPT

## 2024-07-10 PROCEDURE — 80179 DRUG ASSAY SALICYLATE: CPT

## 2024-07-10 RX ADMIN — COLLAGENASE SANTYL SCH APPLIC: 250 OINTMENT TOPICAL at 20:08

## 2024-07-10 RX ADMIN — SODIUM BICARBONATE STA: 84 INJECTION, SOLUTION INTRAVENOUS at 23:49

## 2024-07-10 RX ADMIN — NALOXONE HYDROCHLORIDE STA MG: 0.4 INJECTION, SOLUTION INTRAMUSCULAR; INTRAVENOUS; SUBCUTANEOUS at 11:43

## 2024-07-10 RX ADMIN — CALCIUM GLUCONATE ONE MLS/HR: 10 INJECTION, SOLUTION INTRAVENOUS at 13:12

## 2024-07-10 RX ADMIN — DEXTROSE MONOHYDRATE ONE ML: 25 INJECTION, SOLUTION INTRAVENOUS at 13:04

## 2024-07-10 RX ADMIN — INSULIN HUMAN ONE UNIT: 100 INJECTION, SOLUTION PARENTERAL at 18:02

## 2024-07-10 RX ADMIN — DEXTROSE MONOHYDRATE STA ML: 25 INJECTION, SOLUTION INTRAVENOUS at 23:34

## 2024-07-10 RX ADMIN — DEXTROSE MONOHYDRATE ONE ML: 25 INJECTION, SOLUTION INTRAVENOUS at 18:02

## 2024-07-10 RX ADMIN — FUROSEMIDE STA MG: 10 INJECTION, SOLUTION INTRAMUSCULAR; INTRAVENOUS at 14:22

## 2024-07-10 RX ADMIN — ISODIUM CHLORIDE ONE MG: 0.03 SOLUTION RESPIRATORY (INHALATION) at 15:30

## 2024-07-10 RX ADMIN — SODIUM BICARBONATE ONE ML: 84 INJECTION, SOLUTION INTRAVENOUS at 18:02

## 2024-07-10 RX ADMIN — CALCIUM GLUCONATE ONE MLS/HR: 10 INJECTION, SOLUTION INTRAVENOUS at 23:34

## 2024-07-10 RX ADMIN — INSULIN ASPART SCH: 100 INJECTION, SOLUTION INTRAVENOUS; SUBCUTANEOUS at 17:40

## 2024-07-10 RX ADMIN — INSULIN HUMAN ONE UNIT: 100 INJECTION, SOLUTION PARENTERAL at 23:34

## 2024-07-10 RX ADMIN — SODIUM BICARBONATE STA ML: 84 INJECTION, SOLUTION INTRAVENOUS at 23:37

## 2024-07-10 RX ADMIN — SODIUM ZIRCONIUM CYCLOSILICATE ONE GM: 10 POWDER, FOR SUSPENSION ORAL at 18:02

## 2024-07-10 RX ADMIN — CEFAZOLIN SCH MLS/HR: 330 INJECTION, POWDER, FOR SOLUTION INTRAMUSCULAR; INTRAVENOUS at 14:51

## 2024-07-10 RX ADMIN — NALOXONE HYDROCHLORIDE STA MG: 0.4 INJECTION, SOLUTION INTRAMUSCULAR; INTRAVENOUS; SUBCUTANEOUS at 13:57

## 2024-07-10 RX ADMIN — SODIUM BICARBONATE ONE ML: 84 INJECTION, SOLUTION INTRAVENOUS at 13:10

## 2024-07-10 RX ADMIN — CEFEPIME HYDROCHLORIDE SCH MLS/HR: 2 INJECTION, POWDER, FOR SOLUTION INTRAVENOUS at 18:06

## 2024-07-10 RX ADMIN — INSULIN HUMAN ONE UNIT: 100 INJECTION, SOLUTION PARENTERAL at 13:08

## 2024-07-10 SDOH — SOCIAL STABILITY - SOCIAL INSECURITY: DISSAPEARANCE AND DEATH OF FAMILY MEMBER: Z63.4
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
## 2024-07-10 NOTE — CT
EXAMINATION TYPE: CT brain wo con

CT DLP: 1154.4 mGycm, Automated exposure control for dose reduction was used.

 

DATE OF EXAM: 7/10/2024 12:29 PM

 

COMPARISON: MRI brain 11/23/2018, CT brain 11/22/2018

 

CLINICAL INDICATION:Male, 73 years old with history of AMS, AMS

 

TECHNIQUE: 

Brain: Multiple axial CT images of the brain were obtained without IV contrast. . Coronal and sagitta
l reformats reviewed.

 

FINDINGS:

 

Brain:

Extra-axial spaces: No abnormal extra-axial fluid collections.

Ventricular system: Within normal limits

Cerebral parenchyma: Cerebral atrophy. No acute intraparenchymal hemorrhage or mass effect.  The gray
-white junction is well differentiated. Scattered hypoattenuating areas are seen within the periventr
icular white matter.  

Cerebellum: Unremarkable.

Mass effect: No evidence of midline shift.

Intracranial vasculature: Atherosclerotic calcifications of the intracranial vessels.

Soft tissues: Normal.

Calvarium/osseous structures: No depressed skull fracture.

Paranasal sinuses and mastoid air cells: Clear

Visualized orbits: Orbital contents are intact.

 

 

IMPRESSION:

1. No acute intracranial process.

2. Nonspecific white matter changes, likely secondary to chronic small vessel ischemic disease.

## 2024-07-10 NOTE — XR
EXAMINATION TYPE: XR tibia fibula RT

 

DATE OF EXAM: 7/10/2024 8:42 PM

 

CLINICAL INDICATION:Male, 73 years old with history of right anterior leg wound , hardware; [facility]

 

COMPARISON: Radiographs 5/21/2020

 

TECHNIQUE: XR tibia fibula RT; tibia/fibula was examined in AP and lateral projections.  

 

FINDINGS: 

There is redemonstration of surgical changes including screws and a plate/screw fixation device in th
e mid to distal tibial shaft. The hardware appears intact and unchanged from prior with no surroundin
g lucency evident.

 

Deformities of the mid to distal tibial and fibular shafts consistent with hypertrophic chronic fract
ure deformities, appear similar to the prior study. There is no evidence of acute superimposed fractu
re or osseous destructive process. There does appear to be deep ulceration along the medial aspect of
 the leg at the level of the remote tibial fracture; the depths of this could reach the underlying woody
ne (i.e., bone is exposed within the wound), and if so technically this may be considered osteomyelit
is.

 

Chronic degenerative changes of the ankle and knee are again noted. Mild soft tissue prominence about
 the ankle. 

There are moderate arterial vascular calcifications also noted.

 

 

IMPRESSION: 

 

1.  Status post ORIF with remote healed hypertrophic fracture deformities of the mid to distal tibia 
and fibula.

2.  No definite radiographic evidence to suggest osseous destructive changes from osteomyelitis, torres
rene deep ulceration along the medial aspect of the leg may result in exposed bone, and if so technica
lly this may be considered osteomyelitis.

## 2024-07-10 NOTE — XR
EXAMINATION TYPE: XR chest 1V portable

 

DATE OF EXAM: 7/10/2024 12:40 PM

 

CLINICAL INDICATION:Male, 73 years old with history of altered mental status; 

 

COMPARISON: None

 

TECHNIQUE: XR chest 1V portable Frontal view of the chest.

 

FINDINGS: 

Lungs/Pleura: There is no evidence of pleural effusion, focal consolidation, or pneumothorax.  

Pulmonary vascularity: Unremarkable.

Heart/mediastinum: Cardiomediastinal silhouette is enlarged and stable.

Musculoskeletal: No acute osseous pathology. Midline sternotomy wires are noted.

 

Other findings: None

 

IMPRESSION: 

Cardiomegaly and mild pulmonary vascular congestion. Correlate with BNP for congestive heart failure.
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
## 2024-07-11 LAB
ANION GAP SERPL CALC-SCNC: 10 MMOL/L
BUN SERPL-SCNC: 36 MG/DL (ref 9–20)
CALCIUM SPEC-MCNC: 8.5 MG/DL (ref 8.4–10.2)
CHLORIDE SERPL-SCNC: 113 MMOL/L (ref 98–107)
CO2 SERPL-SCNC: 18 MMOL/L (ref 22–30)
ERYTHROCYTE [DISTWIDTH] IN BLOOD BY AUTOMATED COUNT: 3.3 M/UL (ref 4.3–5.9)
ERYTHROCYTE [DISTWIDTH] IN BLOOD: 15.2 % (ref 11.5–15.5)
GLUCOSE BLD-MCNC: 134 MG/DL (ref 70–110)
GLUCOSE BLD-MCNC: 149 MG/DL (ref 70–110)
GLUCOSE BLD-MCNC: 156 MG/DL (ref 70–110)
GLUCOSE BLD-MCNC: 161 MG/DL (ref 70–110)
GLUCOSE BLD-MCNC: 177 MG/DL (ref 70–110)
GLUCOSE SERPL-MCNC: 124 MG/DL (ref 74–99)
HCT VFR BLD AUTO: 35.7 % (ref 39–53)
HGB BLD-MCNC: 10.5 GM/DL (ref 13–17.5)
MAGNESIUM SPEC-SCNC: 1.4 MG/DL (ref 1.6–2.3)
MCH RBC QN AUTO: 31.8 PG (ref 25–35)
MCHC RBC AUTO-ENTMCNC: 29.4 G/DL (ref 31–37)
MCV RBC AUTO: 108.1 FL (ref 80–100)
PLATELET # BLD AUTO: 228 K/UL (ref 150–450)
POTASSIUM SERPL-SCNC: 5.5 MMOL/L (ref 3.5–5.1)
SODIUM SERPL-SCNC: 141 MMOL/L (ref 137–145)
WBC # BLD AUTO: 19.2 K/UL (ref 3.8–10.6)

## 2024-07-11 RX ADMIN — ISODIUM CHLORIDE SCH MG: 0.03 SOLUTION RESPIRATORY (INHALATION) at 00:43

## 2024-07-11 RX ADMIN — FUROSEMIDE STA MG: 10 INJECTION, SOLUTION INTRAMUSCULAR; INTRAVENOUS at 17:58

## 2024-07-11 RX ADMIN — METOPROLOL TARTRATE SCH MG: 50 TABLET, FILM COATED ORAL at 08:51

## 2024-07-11 RX ADMIN — ACETAMINOPHEN PRN MG: 500 TABLET ORAL at 08:52

## 2024-07-11 RX ADMIN — FUROSEMIDE STA MG: 10 INJECTION, SOLUTION INTRAMUSCULAR; INTRAVENOUS at 21:56

## 2024-07-11 RX ADMIN — TAMSULOSIN HYDROCHLORIDE SCH MG: 0.4 CAPSULE ORAL at 08:51

## 2024-07-11 RX ADMIN — MAGNESIUM SULFATE IN DEXTROSE SCH MLS/HR: 10 INJECTION, SOLUTION INTRAVENOUS at 11:04

## 2024-07-11 NOTE — P.PN
Subjective


Progress Note Date: 07/11/24


Principal diagnosis: 





Reason for follow-up is right leg wound and osteomyelitis





The patient is a 73-year-old  male with a past medical history 

significant for diabetes mellitus hypertension hyperlipidemia coronary disease 

prostate disorder, patient did have a history of previous injury to the right 

leg requiring operative repair with hardware and has been dealing with a 

nonhealing wound to the right anterior leg for few years presented to hospital 

mental status changes possible overdose on fentanyl patch with a worsening wound

to the right leg prompting this consultation.


On today's evaluation that is 07/11/2024, the patient did have a low-grade fever

100.4 F this morning patient is breathing comfortably on 2 L current oxygen 

patient denies having any chest pain shortness with cough or any worsening pain 

to the right leg wound.


Patient white count is 19.2, creat is 1.27 ESR is 85 cultures currently pending





Objective





- Vital Signs


Vital signs: 


                                   Vital Signs











Temp  100.5 F H  07/11/24 10:00


 


Pulse  114 H  07/11/24 10:00


 


Resp  12   07/11/24 10:00


 


BP  133/67   07/11/24 10:00


 


Pulse Ox  94 L  07/11/24 10:00


 


FiO2      








                                 Intake & Output











 07/10/24 07/11/24 07/11/24





 18:59 06:59 18:59


 


Intake Total 150 1600 200


 


Output Total 575 555 310


 


Balance -425 1045 -110


 


Weight 77.111 kg 82.6 kg 


 


Intake:   


 


   600 200


 


    Sodium Chloride 0.9% 1, 150 600 200





    000 ml @ 50 mls/hr IV .   





    Q20H [facility] Rx#:[id number]   


 


  Oral  1000 


 


Output:   


 


  Urine 575 555 310


 


Other:   


 


  Voiding Method Indwelling Catheter Indwelling Catheter Indwelling Catheter














- Exam





GENERAL DESCRIPTION: An elderly male lying in bed in no distress





RESPIRATORY SYSTEM: Unlabored breathing , decreased breath sounds at bases





HEART: S1 S2 regular rate and rhythm ,





ABDOMEN: Soft , no tenderness





EXTREMITIES: Right leg wound is currently dressed





- Labs


CBC & Chem 7: 


                                 07/11/24 04:04





                                 07/11/24 04:04


Labs: 


                  Abnormal Lab Results - Last 24 Hours (Table)











  07/10/24 07/10/24 07/10/24 Range/Units





  11:47 11:47 11:47 


 


WBC  19.9 H    (3.8-10.6)  k/uL


 


RBC  3.29 L    (4.30-5.90)  m/uL


 


Hgb  10.9 L    (13.0-17.5)  gm/dL


 


Hct  35.7 L    (39.0-53.0)  %


 


MCV  108.8 H    (80.0-100.0)  fL


 


MCHC  30.4 L    (31.0-37.0)  g/dL


 


Neutrophils #  18.2 H    (1.3-7.7)  k/uL


 


Lymphocytes #  0.4 L    (1.0-4.8)  k/uL


 


Macrocytosis  Marked A    


 


ABG pH     (7.35-7.45)  


 


ABG pO2     ()  mmHg


 


ABG HCO3     (21-25)  mmol/L


 


ABG Total CO2     (19-24)  mmol/L


 


ABG O2 Saturation     (94-97)  %


 


Potassium   7.3 H*   (3.5-5.1)  mmol/L


 


Chloride   113 H   ()  mmol/L


 


Carbon Dioxide   14 L   (22-30)  mmol/L


 


BUN   32 H   (9-20)  mg/dL


 


Creatinine   1.61 H   (0.66-1.25)  mg/dL


 


Glucose   202 H   (74-99)  mg/dL


 


POC Glucose (mg/dL)     ()  mg/dL


 


Hemoglobin A1c     (<=6.0)  %


 


Calcium   8.1 L   (8.4-10.2)  mg/dL


 


Magnesium     (1.6-2.3)  mg/dL


 


Troponin I    0.259 H*  (0.000-0.034)  ng/mL


 


C-Reactive Protein     (<1.0)  mg/dL


 


Urine Protein     (Negative)  


 


Urine Opiates Screen     (NotDetected)  


 


Ur Oxycodone Screen     (NotDetected)  














  07/10/24 07/10/24 07/10/24 Range/Units





  11:51 12:46 12:50 


 


WBC     (3.8-10.6)  k/uL


 


RBC     (4.30-5.90)  m/uL


 


Hgb     (13.0-17.5)  gm/dL


 


Hct     (39.0-53.0)  %


 


MCV     (80.0-100.0)  fL


 


MCHC     (31.0-37.0)  g/dL


 


Neutrophils #     (1.3-7.7)  k/uL


 


Lymphocytes #     (1.0-4.8)  k/uL


 


Macrocytosis     


 


ABG pH   7.19 L*   (7.35-7.45)  


 


ABG pO2   49 L*   ()  mmHg


 


ABG HCO3   17 L   (21-25)  mmol/L


 


ABG Total CO2   18 L   (19-24)  mmol/L


 


ABG O2 Saturation   86.0 L   (94-97)  %


 


Potassium    7.2 H*  (3.5-5.1)  mmol/L


 


Chloride    114 H  ()  mmol/L


 


Carbon Dioxide    16 L  (22-30)  mmol/L


 


BUN    35 H  (9-20)  mg/dL


 


Creatinine    1.52 H  (0.66-1.25)  mg/dL


 


Glucose    168 H  (74-99)  mg/dL


 


POC Glucose (mg/dL)  193 H    ()  mg/dL


 


Hemoglobin A1c     (<=6.0)  %


 


Calcium    8.3 L  (8.4-10.2)  mg/dL


 


Magnesium     (1.6-2.3)  mg/dL


 


Troponin I     (0.000-0.034)  ng/mL


 


C-Reactive Protein     (<1.0)  mg/dL


 


Urine Protein     (Negative)  


 


Urine Opiates Screen     (NotDetected)  


 


Ur Oxycodone Screen     (NotDetected)  














  07/10/24 07/10/24 07/10/24 Range/Units





  13:19 14:04 14:05 


 


WBC     (3.8-10.6)  k/uL


 


RBC     (4.30-5.90)  m/uL


 


Hgb     (13.0-17.5)  gm/dL


 


Hct     (39.0-53.0)  %


 


MCV     (80.0-100.0)  fL


 


MCHC     (31.0-37.0)  g/dL


 


Neutrophils #     (1.3-7.7)  k/uL


 


Lymphocytes #     (1.0-4.8)  k/uL


 


Macrocytosis     


 


ABG pH     (7.35-7.45)  


 


ABG pO2     ()  mmHg


 


ABG HCO3     (21-25)  mmol/L


 


ABG Total CO2     (19-24)  mmol/L


 


ABG O2 Saturation     (94-97)  %


 


Potassium   6.9 H*   (3.5-5.1)  mmol/L


 


Chloride     ()  mmol/L


 


Carbon Dioxide     (22-30)  mmol/L


 


BUN     (9-20)  mg/dL


 


Creatinine     (0.66-1.25)  mg/dL


 


Glucose     (74-99)  mg/dL


 


POC Glucose (mg/dL)  281 H   179 H  ()  mg/dL


 


Hemoglobin A1c     (<=6.0)  %


 


Calcium     (8.4-10.2)  mg/dL


 


Magnesium     (1.6-2.3)  mg/dL


 


Troponin I     (0.000-0.034)  ng/mL


 


C-Reactive Protein     (<1.0)  mg/dL


 


Urine Protein     (Negative)  


 


Urine Opiates Screen     (NotDetected)  


 


Ur Oxycodone Screen     (NotDetected)  














  07/10/24 07/10/24 07/10/24 Range/Units





  16:06 17:45 20:03 


 


WBC     (3.8-10.6)  k/uL


 


RBC     (4.30-5.90)  m/uL


 


Hgb     (13.0-17.5)  gm/dL


 


Hct     (39.0-53.0)  %


 


MCV     (80.0-100.0)  fL


 


MCHC     (31.0-37.0)  g/dL


 


Neutrophils #     (1.3-7.7)  k/uL


 


Lymphocytes #     (1.0-4.8)  k/uL


 


Macrocytosis     


 


ABG pH     (7.35-7.45)  


 


ABG pO2     ()  mmHg


 


ABG HCO3     (21-25)  mmol/L


 


ABG Total CO2     (19-24)  mmol/L


 


ABG O2 Saturation     (94-97)  %


 


Potassium     (3.5-5.1)  mmol/L


 


Chloride     ()  mmol/L


 


Carbon Dioxide     (22-30)  mmol/L


 


BUN     (9-20)  mg/dL


 


Creatinine     (0.66-1.25)  mg/dL


 


Glucose     (74-99)  mg/dL


 


POC Glucose (mg/dL)  131 H   153 H  ()  mg/dL


 


Hemoglobin A1c     (<=6.0)  %


 


Calcium     (8.4-10.2)  mg/dL


 


Magnesium     (1.6-2.3)  mg/dL


 


Troponin I     (0.000-0.034)  ng/mL


 


C-Reactive Protein     (<1.0)  mg/dL


 


Urine Protein   Trace H   (Negative)  


 


Urine Opiates Screen   Detected H   (NotDetected)  


 


Ur Oxycodone Screen   Detected H   (NotDetected)  














  07/10/24 07/11/24 07/11/24 Range/Units





  21:35 04:04 04:04 


 


WBC    19.2 H  (3.8-10.6)  k/uL


 


RBC    3.30 L  (4.30-5.90)  m/uL


 


Hgb    10.5 L  (13.0-17.5)  gm/dL


 


Hct    35.7 L  (39.0-53.0)  %


 


MCV    108.1 H  (80.0-100.0)  fL


 


MCHC    29.4 L  (31.0-37.0)  g/dL


 


Neutrophils #     (1.3-7.7)  k/uL


 


Lymphocytes #     (1.0-4.8)  k/uL


 


Macrocytosis    Marked A  


 


ABG pH     (7.35-7.45)  


 


ABG pO2     ()  mmHg


 


ABG HCO3     (21-25)  mmol/L


 


ABG Total CO2     (19-24)  mmol/L


 


ABG O2 Saturation     (94-97)  %


 


Potassium  6.2 H*    (3.5-5.1)  mmol/L


 


Chloride     ()  mmol/L


 


Carbon Dioxide     (22-30)  mmol/L


 


BUN     (9-20)  mg/dL


 


Creatinine     (0.66-1.25)  mg/dL


 


Glucose     (74-99)  mg/dL


 


POC Glucose (mg/dL)     ()  mg/dL


 


Hemoglobin A1c   6.4 H   (<=6.0)  %


 


Calcium     (8.4-10.2)  mg/dL


 


Magnesium     (1.6-2.3)  mg/dL


 


Troponin I     (0.000-0.034)  ng/mL


 


C-Reactive Protein     (<1.0)  mg/dL


 


Urine Protein     (Negative)  


 


Urine Opiates Screen     (NotDetected)  


 


Ur Oxycodone Screen     (NotDetected)  














  07/11/24 07/11/24 07/11/24 Range/Units





  04:04 04:04 06:27 


 


WBC     (3.8-10.6)  k/uL


 


RBC     (4.30-5.90)  m/uL


 


Hgb     (13.0-17.5)  gm/dL


 


Hct     (39.0-53.0)  %


 


MCV     (80.0-100.0)  fL


 


MCHC     (31.0-37.0)  g/dL


 


Neutrophils #     (1.3-7.7)  k/uL


 


Lymphocytes #     (1.0-4.8)  k/uL


 


Macrocytosis     


 


ABG pH     (7.35-7.45)  


 


ABG pO2     ()  mmHg


 


ABG HCO3     (21-25)  mmol/L


 


ABG Total CO2     (19-24)  mmol/L


 


ABG O2 Saturation     (94-97)  %


 


Potassium   5.5 H   (3.5-5.1)  mmol/L


 


Chloride   113 H   ()  mmol/L


 


Carbon Dioxide   18 L   (22-30)  mmol/L


 


BUN   36 H   (9-20)  mg/dL


 


Creatinine   1.27 H   (0.66-1.25)  mg/dL


 


Glucose   124 H   (74-99)  mg/dL


 


POC Glucose (mg/dL)    134 H  ()  mg/dL


 


Hemoglobin A1c     (<=6.0)  %


 


Calcium     (8.4-10.2)  mg/dL


 


Magnesium   1.4 L   (1.6-2.3)  mg/dL


 


Troponin I     (0.000-0.034)  ng/mL


 


C-Reactive Protein  5.1 H    (<1.0)  mg/dL


 


Urine Protein     (Negative)  


 


Urine Opiates Screen     (NotDetected)  


 


Ur Oxycodone Screen     (NotDetected)  














Assessment and Plan


(1) Allergy to multiple antibiotics


Current Visit: Yes   Status: Acute   Code(s): Z88.1 - ALLERGY STATUS TO OTHER 

ANTIBIOTIC AGENTS   SNOMED Code(s): 713052554


   





(2) Leukocytosis


Current Visit: Yes   Status: Acute   Code(s): D72.829 - ELEVATED WHITE BLOOD 

CELL COUNT, UNSPECIFIED   SNOMED Code(s): 522113413


   





(3) Leg wound, right


Current Visit: Yes   Status: Acute   Code(s): S81.801A - UNSPECIFIED OPEN WOUND,

RIGHT LOWER LEG, INITIAL ENCOUNTER   SNOMED Code(s): 63732842251367190


   


Plan: 





1patient presented to hospital with mental status changes possibly fentanyl 

overdose patches has been removed patient also have chronic nonhealing wound to 

the right leg which has been there for many years with exposed hardware and 

likely concerning for osteomyelitis patient wound has increased in size compared

to 2-year ago when I saw the patient last with the hardware exposed and highly 

suspicious for possible osteomyelitis will need to cover for resistant gram-

positive as well as gram-negative pathogen.


2local wound culture has been obtained to guide further antibiotic therapy 

blood culture pending patient did have elevated sed rate of 85


3patient with renal insufficiency high risk of nephrotoxicity.


4patient with multiple antibiotic ALLERGIES that would limit the number of 

antibiotic safe to use.


5patient benefit from removal of the infected hardware in the wound bed in 

order to completely heal this infection for now continue with cefepime and 

daptomycin





Dictation was produced using dragon dictation software. please excuse any 

grammatical, word or spelling errors. 








Time with Patient: Less than 30
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
## 2024-07-11 NOTE — XR
EXAMINATION TYPE: XR chest 1V portable

 

DATE OF EXAM: 7/11/2024 7:48 PM

 

CLINICAL INDICATION:Male, 73 years old with history of CHF, bloody sputum;

 

COMPARISON: Chest radiographs from 7/10/2024

 

TECHNIQUE: XR chest 1V portable Frontal view of the chest.

 

FINDINGS: 

Lungs/Pleura: There is no evidence of pleural effusion, focal consolidation, or pneumothorax.  

Pulmonary vascularity: Pulmonary vascular congestion.

Heart/mediastinum: Cardiomediastinal silhouette is enlarged and stable.

Musculoskeletal: No acute osseous pathology.

 

IMPRESSION: 

Interval development of multifocal airspace opacities correlate for pulmonary vascular congestion rene
janye pneumonia.
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
## 2024-07-12 LAB
ANION GAP SERPL CALC-SCNC: 5 MMOL/L
BUN SERPL-SCNC: 45 MG/DL (ref 9–20)
CALCIUM SPEC-MCNC: 8.2 MG/DL (ref 8.4–10.2)
CHLORIDE SERPL-SCNC: 110 MMOL/L (ref 98–107)
CO2 BLDA-SCNC: 24 MMOL/L (ref 19–24)
CO2 SERPL-SCNC: 21 MMOL/L (ref 22–30)
ERYTHROCYTE [DISTWIDTH] IN BLOOD BY AUTOMATED COUNT: 3.32 M/UL (ref 4.3–5.9)
ERYTHROCYTE [DISTWIDTH] IN BLOOD: 14.7 % (ref 11.5–15.5)
GLUCOSE BLD-MCNC: 143 MG/DL (ref 70–110)
GLUCOSE BLD-MCNC: 151 MG/DL (ref 70–110)
GLUCOSE BLD-MCNC: 159 MG/DL (ref 70–110)
GLUCOSE BLD-MCNC: 177 MG/DL (ref 70–110)
GLUCOSE SERPL-MCNC: 139 MG/DL (ref 74–99)
HCO3 BLDA-SCNC: 22 MMOL/L (ref 21–25)
HCT VFR BLD AUTO: 35.6 % (ref 39–53)
HGB BLD-MCNC: 11 GM/DL (ref 13–17.5)
MAGNESIUM SPEC-SCNC: 1.9 MG/DL (ref 1.6–2.3)
MCH RBC QN AUTO: 33.2 PG (ref 25–35)
MCHC RBC AUTO-ENTMCNC: 31 G/DL (ref 31–37)
MCV RBC AUTO: 107.1 FL (ref 80–100)
PCO2 BLDA: 49 MMHG (ref 35–45)
PH BLDA: 7.27 [PH] (ref 7.35–7.45)
PLATELET # BLD AUTO: 203 K/UL (ref 150–450)
PO2 BLDA: 100 MMHG (ref 83–108)
POTASSIUM SERPL-SCNC: 5.2 MMOL/L (ref 3.5–5.1)
SODIUM SERPL-SCNC: 136 MMOL/L (ref 137–145)
VIT B12 SERPL-MCNC: 335 PG/ML (ref 200–944)
WBC # BLD AUTO: 16.5 K/UL (ref 3.8–10.6)

## 2024-07-12 PROCEDURE — 0BH18EZ INSERTION OF ENDOTRACHEAL AIRWAY INTO TRACHEA, VIA NATURAL OR ARTIFICIAL OPENING ENDOSCOPIC: ICD-10-PCS

## 2024-07-12 PROCEDURE — 5A09357 ASSISTANCE WITH RESPIRATORY VENTILATION, LESS THAN 24 CONSECUTIVE HOURS, CONTINUOUS POSITIVE AIRWAY PRESSURE: ICD-10-PCS

## 2024-07-12 PROCEDURE — 3E033XZ INTRODUCTION OF VASOPRESSOR INTO PERIPHERAL VEIN, PERCUTANEOUS APPROACH: ICD-10-PCS

## 2024-07-12 PROCEDURE — 5A1945Z RESPIRATORY VENTILATION, 24-96 CONSECUTIVE HOURS: ICD-10-PCS

## 2024-07-12 PROCEDURE — 0B9M8ZX DRAINAGE OF BILATERAL LUNGS, VIA NATURAL OR ARTIFICIAL OPENING ENDOSCOPIC, DIAGNOSTIC: ICD-10-PCS

## 2024-07-12 RX ADMIN — NOREPINEPHRINE BITARTRATE SCH MLS/HR: 1 INJECTION, SOLUTION, CONCENTRATE INTRAVENOUS at 08:15

## 2024-07-12 RX ADMIN — CEFAZOLIN STA ML: 330 INJECTION, POWDER, FOR SOLUTION INTRAMUSCULAR; INTRAVENOUS at 17:37

## 2024-07-12 RX ADMIN — CHLORHEXIDINE GLUCONATE SCH ML: 1.2 RINSE ORAL at 09:54

## 2024-07-12 RX ADMIN — CISATRACURIUM BESYLATE ONE MG: 2 INJECTION, SOLUTION INTRAVENOUS at 08:15

## 2024-07-12 RX ADMIN — NICARDIPINE HYDROCHLORIDE STA ML: 2.5 INJECTION INTRAVENOUS at 11:21

## 2024-07-12 RX ADMIN — CEFAZOLIN SCH MLS/HR: 330 INJECTION, POWDER, FOR SOLUTION INTRAMUSCULAR; INTRAVENOUS at 22:28

## 2024-07-12 RX ADMIN — HEPARIN SODIUM SCH UNIT: 5000 INJECTION, SOLUTION INTRAVENOUS; SUBCUTANEOUS at 00:56

## 2024-07-12 RX ADMIN — CEFAZOLIN STA ML: 330 INJECTION, POWDER, FOR SOLUTION INTRAMUSCULAR; INTRAVENOUS at 07:18

## 2024-07-12 NOTE — OP
OPERATIVE REPORT



DATE OF SERVICE :



PROCEDURE PERFORMED:

Placement of a right radial arterial line.



PREOPERATIVE DIAGNOSES:

Acute hypoxic respiratory failure, hypotension, hemoptysis.



POSTOPERATIVE DIAGNOSES:

Acute hypoxic respiratory failure, hypotension, hemoptysis.



ANESTHESIA USED:

None deployed.



DESCRIPTION OF PROCEDURE:

The right wrist was prepared in a sterile fashion.  Drapes were applied.  The right

radial artery palpated, cannulated easily.  Guidewire was placed.  A Cook's catheter

inserted over the guidewire, and the guidewire was removed.  Good blood flow, good

waveform, no complications.  Line was secured using 3.0 silk sutures.





MMODL / IJN: [id number] / Job#: [id number]
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
## 2024-07-12 NOTE — OP
OPERATIVE REPORT



DATE OF SERVICE :



PROCEDURE PERFORMED:

Placement of a right IJ triple-lumen catheter.



PREOPERATIVE DIAGNOSES:

Acute hypoxic respiratory failure and hemoptysis.



POSTOPERATIVE DIAGNOSES:

Acute hypoxic respiratory failure and hemoptysis.



ANESTHESIA USED:

2 mL of 1% lidocaine.



DESCRIPTION OF PROCEDURE:

The patient was placed in a Trendelenburg position, the area of the right cervical

region was prepared in a sterile fashion.  Drapes were applied.  Using the posterior

approach, the area behind the posterior belly of the sternocleidomastoid was locally

anesthetized.  Then, the right internal jugular vein was easily cannulated, and a

guidewire was placed.  The area around the guidewire was dilated.  The triple-lumen

catheter was inserted over the guidewire, and the guidewire was removed.  Good blood

flow noted in the 3 different ports of the triple-lumen catheter, no complications,

line was secured using 3.0 silk suture.  Chest x-ray postoperatively showed adequate

placement and no complications.





MMKIL / CARLON: [id number] / Job#: [id number]

## 2024-07-12 NOTE — XR
EXAMINATION TYPE: XR chest 1V portable

 

DATE OF EXAM: 7/12/2024

 

COMPARISON: 7/12/2024

 

INDICATION: Intubation difficulty breathing

 

TECHNIQUE: Single frontal view of the chest is obtained.

 

FINDINGS:  

The heart size is enlarged.  

The pulmonary vasculature is normal.  

Patchy bilateral lung infiltrates are present.  

Endotracheal tube tip is 5.4 cm above the machelle.  Nasogastric tube tip is in the left upper quadrant
 of the abdomen.

 

IMPRESSION:  

1. Patchy bilateral lung infiltrates may have slight improvement from comparison. Correlate for pneum
onia.
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
## 2024-07-12 NOTE — OP
OPERATIVE REPORT



DATE OF SERVICE :



PROCEDURES PERFORMED:

Bronchoscopy and random bronchoalveolar lavage.



PREOPERATIVE DIAGNOSIS:

Hemoptysis.



POSTOPERATIVE DIAGNOSIS:

No active hemoptysis noted during bronchoscopy.



ANESTHESIA USED:

The patient was already intubated on mechanical ventilation on propofol, and he was

given 10 mg of Nimbex prior to the procedure.



DESCRIPTION OF PROCEDURE:

The patient was placed in the supine position, the endotracheal tube was connected to

mechanical ventilation, and an adapter was applied.  We monitored his cardiac rhythm

continuously, blood pressure was also monitored continuously, and O2 saturation was

continuously monitored.  After adequate sedation, the bronchoscope was advanced through

the endotracheal tube and went down to the distal trachea.  There was some blood, which

was all noted in the endotracheal tube and in the distal trachea.  Thorough examination

was done of the right upper lobe, right middle lobe, right lower lobe, left upper lobe,

lingula, and left lower lobe.  There was no evidence of any active bleeding, there was

some blood/old blood noted on the mucosa throughout the airways, but no clear-cut

evidence of active bleeding during the bronchoscopy exam.  Different areas of the lungs

were lavaged, and again no active bleeding was noted.  The fluid was sent for different

diagnostic studies.  The procedure was well tolerated, and no complications.





MMODL / IJN: [id number] / Job#: [id number]

## 2024-07-12 NOTE — XR
EXAMINATION TYPE: XR chest 1V portable

 

DATE OF EXAM: 7/12/2024 9:26 AM

 

CLINICAL INDICATION:Male, 73 years old with history of bronch and line insertion; [facility]

 

COMPARISON: Chest radiographs from 7/12/2024

 

TECHNIQUE: XR chest 1V portable Frontal view of the chest.

 

FINDINGS: 

Lungs/Pleura: There is no evidence of pleural effusion, focal consolidation, or pneumothorax.  

Pulmonary vascularity: Unremarkable.

Heart/mediastinum: Cardiomediastinal silhouette is unremarkable.

Musculoskeletal: No acute osseous pathology.

 

Other findings: None

 

Lines/Tubes:

Endotracheal tube with distal tip 6.9 cm above the machelle.

Nasogastric tube with its distal tip and side-port projecting under the diaphragm.

Right internal jugular central venous catheter with distal tip at the cavoatrial junction.

 

IMPRESSION: 

1.  Right central venous catheter in place without evidence of pneumothorax.

2.  Endotracheal tube in high position consider advancement of 3 cm for optimal placement.

3.  Multifocal airspace opacities.

4.  Cardiomegaly

## 2024-07-12 NOTE — XR
EXAMINATION TYPE: XR chest 1V portable

 

DATE OF EXAM: 7/12/2024 5:08 AM

 

CLINICAL INDICATION:Male, 73 years old with history of CHF follow up; [facility]

 

COMPARISON: Chest radiographs from

 

TECHNIQUE: XR chest 1V portable Frontal view of the chest.

 

FINDINGS: 

Lungs/Pleura: Worsening consolidation airspace opacities most pronounced in the right midlung may be 
low lung lines in today's exam. No pneumothorax.

Pulmonary vascularity: Pulmonary vascular congestion.

Heart/mediastinum: Cardiomediastinal silhouette is enlarged and stable.

Musculoskeletal: No acute osseous pathology. Midline sternotomy wires are noted.

 

IMPRESSION: 

Worsening consolidation changes throughout the lungs possibly due to low lung volumes.

## 2024-07-13 LAB
ANION GAP SERPL CALC-SCNC: 5 MMOL/L
BUN SERPL-SCNC: 37 MG/DL (ref 9–20)
CALCIUM SPEC-MCNC: 7.8 MG/DL (ref 8.4–10.2)
CHLORIDE SERPL-SCNC: 117 MMOL/L (ref 98–107)
CO2 BLDA-SCNC: 19 MMOL/L (ref 19–24)
CO2 SERPL-SCNC: 18 MMOL/L (ref 22–30)
ERYTHROCYTE [DISTWIDTH] IN BLOOD BY AUTOMATED COUNT: 2.91 M/UL (ref 4.3–5.9)
ERYTHROCYTE [DISTWIDTH] IN BLOOD: 14.9 % (ref 11.5–15.5)
GLUCOSE BLD-MCNC: 144 MG/DL (ref 70–110)
GLUCOSE BLD-MCNC: 144 MG/DL (ref 70–110)
GLUCOSE BLD-MCNC: 152 MG/DL (ref 70–110)
GLUCOSE BLD-MCNC: 154 MG/DL (ref 70–110)
GLUCOSE BLD-MCNC: 158 MG/DL (ref 70–110)
GLUCOSE SERPL-MCNC: 138 MG/DL (ref 74–99)
HCO3 BLDA-SCNC: 18 MMOL/L (ref 21–25)
HCT VFR BLD AUTO: 31 % (ref 39–53)
HGB BLD-MCNC: 9.4 GM/DL (ref 13–17.5)
MAGNESIUM SPEC-SCNC: 1.7 MG/DL (ref 1.6–2.3)
MCH RBC QN AUTO: 32.3 PG (ref 25–35)
MCHC RBC AUTO-ENTMCNC: 30.4 G/DL (ref 31–37)
MCV RBC AUTO: 106.4 FL (ref 80–100)
PCO2 BLDA: 36 MMHG (ref 35–45)
PH BLDA: 7.31 [PH] (ref 7.35–7.45)
PLATELET # BLD AUTO: 188 K/UL (ref 150–450)
PO2 BLDA: 87 MMHG (ref 83–108)
POTASSIUM SERPL-SCNC: 4.5 MMOL/L (ref 3.5–5.1)
SODIUM SERPL-SCNC: 140 MMOL/L (ref 137–145)
WBC # BLD AUTO: 13.9 K/UL (ref 3.8–10.6)

## 2024-07-13 RX ADMIN — SODIUM CHLORIDE SCH MLS/HR: 900 INJECTION, SOLUTION INTRAVENOUS at 09:37

## 2024-07-13 RX ADMIN — INSULIN ASPART SCH: 100 INJECTION, SOLUTION INTRAVENOUS; SUBCUTANEOUS at 18:25

## 2024-07-13 RX ADMIN — NOREPINEPHRINE BITARTRATE SCH MLS/HR: 1 INJECTION, SOLUTION, CONCENTRATE INTRAVENOUS at 12:17

## 2024-07-13 RX ADMIN — MAGNESIUM SULFATE IN DEXTROSE ONE MLS/HR: 10 INJECTION, SOLUTION INTRAVENOUS at 05:22

## 2024-07-13 RX ADMIN — METRONIDAZOLE SCH MLS/HR: 500 INJECTION, SOLUTION INTRAVENOUS at 15:49

## 2024-07-13 NOTE — XR
EXAMINATION TYPE: XR chest 1V portable

 

DATE OF EXAM: 7/13/2024

 

COMPARISON: 7/12/2024

 

INDICATION: Tube placement

 

TECHNIQUE: Single frontal view of the chest is obtained.

 

FINDINGS:  

The heart size is enlarged.  

The pulmonary vasculature is normal.  

Bilateral lung infiltrates are present. There may be slight improvement on the left.

 

Endotracheal tube tip is above the machelle. Nasogastric tube tip is in the left arm of the abdomen. Ri
ght central venous catheter tip is within the deep right atrium.  

 

 

IMPRESSION:  

1. Diffuse bilateral lung infiltrates.

2. Lines and catheters discussed above

## 2024-07-13 NOTE — P.PN
Subjective


Progress Note Date: 07/13/24


Principal diagnosis: 





Reason for follow-up is right leg wound and osteomyelitis





The patient is a 73-year-old  male with a past medical history 

significant for diabetes mellitus hypertension hyperlipidemia coronary disease 

prostate disorder, patient did have a history of previous injury to the right 

leg requiring operative repair with hardware and has been dealing with a 

nonhealing wound to the right anterior leg for few years presented to hospital 

mental status changes possible overdose on fentanyl patch with a worsening wound

to the right leg prompting this consultation.


On today's evaluation that is 07/13/2024, patient did have low-grade fever 100.2

at 4 AM the patient is afebrile since then, the patient is intubated on the vent

FiO2 at 40% hemodynamically stable no diarrhea or any other changes reported by 

nursing staff.


Patient white count is down to 13.9 creatinine 0.75 culture with MRSA and 

bacteroids





Objective





- Vital Signs


Vital signs: 


                                   Vital Signs











Temp  99.5 F   07/13/24 08:00


 


Pulse  93   07/13/24 12:03


 


Resp  24   07/13/24 11:00


 


BP  105/53   07/12/24 18:00


 


Pulse Ox  97   07/13/24 11:00


 


FiO2  40   07/13/24 11:45








                                 Intake & Output











 07/12/24 07/13/24 07/13/24





 18:59 06:59 18:59


 


Intake Total 758.476 5564.505 831.948


 


Output Total 441 1347 555


 


Balance 549.298 787.505 276.948


 


Weight 82.6 kg 91.2 kg 91.2 kg


 


Intake:   


 


   1231 655


 


    .9 500 Bolus 500  


 


    0.9 KVO 10 40 


 


    0.9 presure bags  66 30


 


    Cefepime 2 gm In Sodium 100 100 





    Chloride 0.9% 100 ml @ 25   





    mls/hr IVPB Q12H DAVID Rx#   





    :[id number]   


 


    DAPTOmycin 450 mg In  50 





    Sodium Chloride 0.9% 50   





    ml @ 100 mls/hr IVPB Q24H   





    DAVID Rx#:[id number]   


 


    Magnesium Sulfate-D5w Pmx  100 





    1 gm In Dextrose/Water 1   





    100ml.bag @ 100 mls/hr   





    IVPB ONCE ONE Rx#:   





    [id number]   


 


    Sodium Chloride 0.9% 1,  875 625





    000 ml @ 125 mls/hr IV .   





    Q8H DAVID Rx#:[id number]   


 


  Intake, IV Titration 380.298 903.505 176.948





  Amount   


 


    Norepinephrine 4 mg In 221.239 635.372 116.443





    Sodium Chloride 0.9% 250   





    ml @ 0.03 MCG/KG/MIN 9.   





    441 mls/hr IV .Q24H DAVID   





    Rx#:[id number]   


 


    propofoL 1,000 mg In 159.059 268.133 60.505





    Empty Bag 1 bag @ 15 MCG/   





    KG/MIN 7.434 mls/hr IV .   





    [id number] DAVID Rx#:[id number]   


 


Output:   


 


  Gastric Drainage  350 


 


  Urine 441 997 555


 


Other:   


 


  Voiding Method Indwelling Catheter Indwelling Catheter Indwelling Catheter








                       ABP, PAP, CO, CI - Last Documented











Arterial Blood Pressure        111/41

















- Exam





GENERAL DESCRIPTION: An elderly male intubated on the vent





RESPIRATORY SYSTEM: Unlabored breathing , decreased breath sounds at bases





HEART: S1 S2 regular rate and rhythm ,





ABDOMEN: Soft , no tenderness





EXTREMITIES: Right leg wound is currently dressed





- Labs


CBC & Chem 7: 


                                 07/13/24 04:22





                                 07/13/24 04:22


Labs: 


                  Abnormal Lab Results - Last 24 Hours (Table)











  07/12/24 07/12/24 07/12/24 Range/Units





  08:30 17:52 20:10 


 


WBC     (3.8-10.6)  k/uL


 


RBC     (4.30-5.90)  m/uL


 


Hgb     (13.0-17.5)  gm/dL


 


Hct     (39.0-53.0)  %


 


MCV     (80.0-100.0)  fL


 


MCHC     (31.0-37.0)  g/dL


 


ABG pH     (7.35-7.45)  


 


ABG HCO3     (21-25)  mmol/L


 


ABG O2 Saturation     (94-97)  %


 


Chloride     ()  mmol/L


 


Carbon Dioxide     (22-30)  mmol/L


 


BUN     (9-20)  mg/dL


 


Glucose     (74-99)  mg/dL


 


POC Glucose (mg/dL)   143 H  151 H  ()  mg/dL


 


Calcium     (8.4-10.2)  mg/dL


 


Fluid Appearance  Bloody A    (Clear)  














  07/13/24 07/13/24 07/13/24 Range/Units





  04:22 04:22 05:22 


 


WBC  13.9 H    (3.8-10.6)  k/uL


 


RBC  2.91 L    (4.30-5.90)  m/uL


 


Hgb  9.4 L D    (13.0-17.5)  gm/dL


 


Hct  31.0 L    (39.0-53.0)  %


 


MCV  106.4 H    (80.0-100.0)  fL


 


MCHC  30.4 L    (31.0-37.0)  g/dL


 


ABG pH    7.31 L  (7.35-7.45)  


 


ABG HCO3    18 L  (21-25)  mmol/L


 


ABG O2 Saturation    97.6 H  (94-97)  %


 


Chloride   117 H   ()  mmol/L


 


Carbon Dioxide   18 L   (22-30)  mmol/L


 


BUN   37 H   (9-20)  mg/dL


 


Glucose   138 H   (74-99)  mg/dL


 


POC Glucose (mg/dL)     ()  mg/dL


 


Calcium   7.8 L   (8.4-10.2)  mg/dL


 


Fluid Appearance     (Clear)  














  07/13/24 07/13/24 07/13/24 Range/Units





  06:15 06:27 12:59 


 


WBC     (3.8-10.6)  k/uL


 


RBC     (4.30-5.90)  m/uL


 


Hgb     (13.0-17.5)  gm/dL


 


Hct     (39.0-53.0)  %


 


MCV     (80.0-100.0)  fL


 


MCHC     (31.0-37.0)  g/dL


 


ABG pH     (7.35-7.45)  


 


ABG HCO3     (21-25)  mmol/L


 


ABG O2 Saturation     (94-97)  %


 


Chloride     ()  mmol/L


 


Carbon Dioxide     (22-30)  mmol/L


 


BUN     (9-20)  mg/dL


 


Glucose     (74-99)  mg/dL


 


POC Glucose (mg/dL)  154 H  144 H  152 H  ()  mg/dL


 


Calcium     (8.4-10.2)  mg/dL


 


Fluid Appearance     (Clear)  








                      Microbiology - Last 24 Hours (Table)











 07/12/24 08:30 Gram Stain - Preliminary





 Bronchoalviolar Lavage - Right Bronchial Washings Culture - Preliminary


 


 07/10/24 17:45 Anaerobic Culture - Final





 Leg - Right    Bacteroides thetaiotaomicron


 


 07/10/24 17:45 Gram Stain - Final





 Leg - Right Wound Culture - Final





    Methicillin resist S. aureus


 


 07/10/24 11:47 Blood Culture - Preliminary





 Blood 


 


 07/10/24 11:30 Blood Culture - Preliminary





 Blood 














Assessment and Plan


(1) Allergy to multiple antibiotics


Current Visit: Yes   Status: Acute   Code(s): Z88.1 - ALLERGY STATUS TO OTHER 

ANTIBIOTIC AGENTS   SNOMED Code(s): 726118037


   





(2) Leukocytosis


Current Visit: Yes   Status: Acute   Code(s): D72.829 - ELEVATED WHITE BLOOD 

CELL COUNT, UNSPECIFIED   SNOMED Code(s): 483761067


   





(3) Leg wound, right


Current Visit: Yes   Status: Acute   Code(s): S81.801A - UNSPECIFIED OPEN WOUND,

RIGHT LOWER LEG, INITIAL ENCOUNTER   SNOMED Code(s): 55153453366676579


   


Plan: 





1patient presented to hospital with mental status changes possibly fentanyl 

overdose patches has been removed patient also have chronic nonhealing wound to 

the right leg which has been there for many years with exposed hardware and 

likely concerning for osteomyelitis patient wound has increased in size compared

to 2-year ago when I saw the patient last with the hardware exposed and highly 

suspicious for possible osteomyelitis .


2local wound culture currently growing MRSA and bacteroids patient did have 

elevated sed rate of 85


3patient with multiple antibiotic ALLERGIES that would limit the number of 

antibiotic safe to use.


4patient benefit from removal of the infected hardware in the wound bed in 

order to completely heal this infection 


5patient to continue with the daptomycin and Flagyl to cover for the bacteroids

discontinue cefepime as no gram-negative has been grown





Dictation was produced using dragon dictation software. please excuse any 

grammatical, word or spelling errors.
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
## 2024-07-13 NOTE — CA
Transthoracic Echo Report 

 Name: Loy Spencer 

 MRN:    [id number] 

 Age:    73     Gender:     M 

 

 :    1950 

 Exam Date:     2024 14:30 

 Exam Location: [facility] 

 Ht (in):     69     Wt (lb):     182 

 Ordering Physician:        Satya Ramos MD 

 Attending/Referring Phys: 

 Technician         Bárbara Cedeno RDCS 

 Procedure CPT: 

 Indications:       broch and line insertion 

 

 Cardiac Hx: 

 Technical Quality:      Fair 

 Contrast 1:                                Total Dose (mL): 

 Contrast 2:                                Total Dose (mL): 

 

 MEASUREMENTS  (Male / Female) Normal Values 

 2D ECHO 

 LV Diastolic Diameter PLAX        4.8 cm                4.2 - 5.9 / 3.9 - 5.3 cm 

 LV Systolic Diameter PLAX         2.7 cm                 

 IVS Diastolic Thickness           1.5 cm                0.6 - 1.0 / 0.6 - 0.9 cm 

 LVPW Diastolic Thickness          1.4 cm                0.6 - 1.0 / 0.6 - 0.9 cm 

 LV Relative Wall Thickness        0.6                    

 RV Internal Dim ED PLAX           4.2 cm                 

 LVOT Diameter                     1.9 cm                 

 LA Volume                         86.9 cm???              18 - 58 / 22 - 52 cm??? 

 LA Volume Index                   43.0 cm???/m???           16 - 28 cm???/m??? 

 

 M-MODE 

 Aortic Root Diameter MM           3.2 cm                 

 LA Systolic Diameter MM           5.8 cm                 

 LA Ao Ratio MM                    1.8                    

 AV Cusp Separation MM             1.2 cm                 

 

 DOPPLER 

 AV Peak Velocity                  346.9 cm/s             

 AV Peak Gradient                  48.1 mmHg              

 AV Mean Velocity                  250.4 cm/s             

 AV Mean Gradient                  27.5 mmHg              

 AV Velocity Time Integral         81.7 cm                

 LVOT Peak Velocity                75.9 cm/s              

 LVOT Peak Gradient                2.3 mmHg               

 LVOT Velocity Time Integral       19.5 cm                

 LVOT Stroke Volume                54.7 cm???               

 LVOT Stroke Volume Index          27.6 ml/m???             

 LVOT Cardiac Index                1435.1 cm???/min???m???      

 AV Area Cont Eq vti               0.7 cm???                

 AV Area Cont Eq pk                0.6 cm???                

 MV Area PHT                       3.2 cm???                

 Mitral E Point Velocity           127.2 cm/s             

 Mitral A Point Velocity           66.4 cm/s              

 Mitral E to A Ratio               1.9                    

 MV Deceleration Time              240.5 ms               

 MV E' Velocity                    4.1 cm/s               

 Mitral E to MV E' Ratio           31.2                   

 TR Peak Velocity                  296.7 cm/s             

 TR Peak Gradient                  35.2 mmHg              

 Right Ventricular Systolic Press  38.7 mmHg              

 

 

 FINDINGS 

 Left Ventricle 

 Moderately increased septal wall thickness. Left ventricular cavity size  

 normal. Abnormal (paradoxical) septal motion consistent with postoperative  

 state. Hypokinetic anterior wall. Left ventricular ejection fraction is  

 estimated at 40 %. Grade 2 diastolic dysfunction. 

 

 Right Ventricle 

 Right ventricular dilatation. Mild pulmonary hypertension. Right ventricular  

 systolic pressure estimated at 39 mm hg. 

 

 Right Atrium 

 Right atrial dilatation. 

 

 Left Atrium 

 Severely increased left atrial volume. Mildly increased left atrial area. 

 

 Mitral Valve 

 Structurally normal mitral valve. Mitral valve thickened. Moderate mitral  

 annular calcification. Moderate mitral regurgitation. 

 

 Aortic Valve 

 Moderate to severe aortic stenosis with a peak gradient of 48 mmHg and a mean  

 gradient of 28 mmHg. component of low-flow low-gradient aortic stenosis. 

 

 Tricuspid Valve 

 Structurally normal tricuspid valve. Mild tricuspid regurgitation. 

 

 Pulmonic Valve 

 Structurally normal pulmonic valve. Trace pulmonic regurgitation. 

 

 Pericardium 

 No pericardial effusion. 

 

 Aorta 

 Normal size aortic root and proximal ascending aorta. 

 

 CONCLUSIONS 

 Left ventricular ejection fraction 40% 

 RVSP 39 

 Moderate mitral annular calcification 

 Moderate mitral regurgitation 

 Moderate to severe aortic stenosis with dimensionless index 0.22.  Consider  

 KIRSTIE, low-dose dobutamine stress echo if clinically indicated. 

 Mild tricuspid regurgitation 

 Previewed by:  

 Dr. Daniele Mcmahon DO 

 (Electronically Signed) 

 Final Date:      2024 09:07

## 2024-07-14 LAB
ANION GAP SERPL CALC-SCNC: 4 MMOL/L
BUN SERPL-SCNC: 23 MG/DL (ref 9–20)
CALCIUM SPEC-MCNC: 7.6 MG/DL (ref 8.4–10.2)
CHLORIDE SERPL-SCNC: 120 MMOL/L (ref 98–107)
CO2 BLDA-SCNC: 20 MMOL/L (ref 19–24)
CO2 SERPL-SCNC: 18 MMOL/L (ref 22–30)
ERYTHROCYTE [DISTWIDTH] IN BLOOD BY AUTOMATED COUNT: 2.82 M/UL (ref 4.3–5.9)
ERYTHROCYTE [DISTWIDTH] IN BLOOD: 15.2 % (ref 11.5–15.5)
GLUCOSE BLD-MCNC: 162 MG/DL (ref 70–110)
GLUCOSE BLD-MCNC: 173 MG/DL (ref 70–110)
GLUCOSE BLD-MCNC: 195 MG/DL (ref 70–110)
GLUCOSE SERPL-MCNC: 177 MG/DL (ref 74–99)
HCO3 BLDA-SCNC: 18 MMOL/L (ref 21–25)
HCT VFR BLD AUTO: 30.6 % (ref 39–53)
HGB BLD-MCNC: 9.5 GM/DL (ref 13–17.5)
MCH RBC QN AUTO: 33.8 PG (ref 25–35)
MCHC RBC AUTO-ENTMCNC: 31.1 G/DL (ref 31–37)
MCV RBC AUTO: 108.6 FL (ref 80–100)
PCO2 BLDA: 43 MMHG (ref 35–45)
PH BLDA: 7.24 [PH] (ref 7.35–7.45)
PLATELET # BLD AUTO: 169 K/UL (ref 150–450)
PO2 BLDA: 155 MMHG (ref 83–108)
POTASSIUM SERPL-SCNC: 4.4 MMOL/L (ref 3.5–5.1)
SODIUM SERPL-SCNC: 142 MMOL/L (ref 137–145)
WBC # BLD AUTO: 9.9 K/UL (ref 3.8–10.6)

## 2024-07-14 RX ADMIN — SODIUM BICARBONATE STA ML: 84 INJECTION, SOLUTION INTRAVENOUS at 12:11

## 2024-07-14 RX ADMIN — FUROSEMIDE SCH MG: 10 INJECTION, SOLUTION INTRAMUSCULAR; INTRAVENOUS at 12:11

## 2024-07-14 RX ADMIN — MAGNESIUM SULFATE IN DEXTROSE ONE MLS/HR: 10 INJECTION, SOLUTION INTRAVENOUS at 06:34

## 2024-07-14 NOTE — P.PN
Subjective


Progress Note Date: 07/14/24


Principal diagnosis: 





Reason for follow-up is right leg wound and osteomyelitis





The patient is a 73-year-old  male with a past medical history 

significant for diabetes mellitus hypertension hyperlipidemia coronary disease 

prostate disorder, patient did have a history of previous injury to the right 

leg requiring operative repair with hardware and has been dealing with a 

nonhealing wound to the right anterior leg for few years presented to hospital 

mental status changes possible overdose on fentanyl patch with a worsening wound

to the right leg prompting this consultation.


On today's evaluation that is 07/14/2024, Patient is afebrile this morning 

patient remains to be intubated on the vent FiO2 is currently stable at 35% no 

significant purulent secretion through the ET or any other changes reported by 

nursing staff.


Patient white count 9.9, creatinine 0.61 BAL currently been negative





Objective





- Vital Signs


Vital signs: 


                                   Vital Signs











Temp  98.3 F   07/14/24 08:00


 


Pulse  75   07/14/24 11:08


 


Resp  24   07/14/24 11:00


 


BP  105/53   07/12/24 18:00


 


Pulse Ox  98   07/14/24 11:00


 


FiO2  35   07/14/24 11:05








                                 Intake & Output











 07/13/24 07/14/24 07/14/24





 18:59 06:59 18:59


 


Intake Total 2115.003 3020.648 633.077


 


Output Total 1165 860 130


 


Balance 590.710 3879.648 503.077


 


Weight 90.7 kg  


 


Intake:   


 


  IV 1722 1803 362


 


    0.9 presure bags 72 78 12


 


    Cefepime 2 gm In Sodium 100  





    Chloride 0.9% 100 ml @ 25   





    mls/hr IVPB Q12H [facility] Rx#   





    :[id number]   


 


    DAPTOmycin 450 mg In 50  





    Sodium Chloride 0.9% 50   





    ml @ 100 mls/hr IVPB Q24H   





    [facility] Rx#:[id number]   


 


    Magnesium Sulfate-D5w Pmx  100 





    1 gm In Dextrose/Water 1   





    100ml.bag @ 100 mls/hr   





    IVPB ONCE ONE Rx#:   





    [id number]   


 


    Sodium Chloride 0.9% 1, 1500 1625 250





    000 ml @ 50 mls/hr IV .   





    Q20H [facility] Rx#:[id number]   


 


    metroNIDAZOLE-NS    100





    mg In Saline 1 100ml.bag   





    @ 100 mls/hr IVPB Q8HR   





    [facility] Rx#:[id number]   


 


  Intake, IV Titration 373.003 817.648 161.077





  Amount   


 


    Norepinephrine 4 mg In 116.443  





    Sodium Chloride 0.9% 250   





    ml @ 0.03 MCG/KG/MIN 9.   





    441 mls/hr IV .Q24H [facility]   





    Rx#:[id number]   


 


    Norepinephrine 8 mg In 15.265 603.088 





    Sodium Chloride 0.9% 250   





    ml @ 0.18 MCG/KG/MIN 31.   





    765 mls/hr IV .Q8H8M [facility]   





    Rx#:[id number]   


 


    fentaNYL (PF). 1,000 mcg 37.012 24.32 97.888





    In Sodium Chloride 0.9%   





    80 ml @ 0.5 MCG/KG/HR 4.   





    56 mls/hr IV .[id number] [facility]   





    Rx#:[id number]   


 


    propofoL 1,000 mg In 204.283 190.240 63.189





    Empty Bag 1 bag @ 15 MCG/   





    KG/MIN 7.434 mls/hr IV .   





    [id number] [facility] Rx#:[id number]   


 


  Tube Feeding 20 400 80


 


  Other   30


 


Output:   


 


  Urine 1165 860 130


 


Other:   


 


  Voiding Method Indwelling Catheter Indwelling Catheter Indwelling Catheter








                       ABP, PAP, CO, CI - Last Documented











Arterial Blood Pressure        122/39

















- Exam





GENERAL DESCRIPTION: An elderly male intubated on the vent





RESPIRATORY SYSTEM: Unlabored breathing , decreased breath sounds at bases





HEART: S1 S2 regular rate and rhythm ,





ABDOMEN: Soft , no tenderness





EXTREMITIES: Right leg wound is currently dressed





- Labs


CBC & Chem 7: 


                                 07/14/24 05:07





                                 07/14/24 05:07


Labs: 


                  Abnormal Lab Results - Last 24 Hours (Table)











  07/13/24 07/13/24 07/13/24 Range/Units





  12:59 18:23 23:33 


 


RBC     (4.30-5.90)  m/uL


 


Hgb     (13.0-17.5)  gm/dL


 


Hct     (39.0-53.0)  %


 


MCV     (80.0-100.0)  fL


 


Macrocytosis     


 


ABG pH     (7.35-7.45)  


 


ABG pO2     ()  mmHg


 


ABG HCO3     (21-25)  mmol/L


 


ABG O2 Saturation     (94-97)  %


 


Chloride     ()  mmol/L


 


Carbon Dioxide     (22-30)  mmol/L


 


BUN     (9-20)  mg/dL


 


Creatinine     (0.66-1.25)  mg/dL


 


Glucose     (74-99)  mg/dL


 


POC Glucose (mg/dL)  152 H  144 H  158 H  ()  mg/dL


 


Calcium     (8.4-10.2)  mg/dL














  07/14/24 07/14/24 07/14/24 Range/Units





  05:07 05:07 05:25 


 


RBC  2.82 L    (4.30-5.90)  m/uL


 


Hgb  9.5 L    (13.0-17.5)  gm/dL


 


Hct  30.6 L    (39.0-53.0)  %


 


MCV  108.6 H    (80.0-100.0)  fL


 


Macrocytosis  Marked A    


 


ABG pH    7.24 L  (7.35-7.45)  


 


ABG pO2    155 H  ()  mmHg


 


ABG HCO3    18 L  (21-25)  mmol/L


 


ABG O2 Saturation    99.7 H  (94-97)  %


 


Chloride   120 H   ()  mmol/L


 


Carbon Dioxide   18 L   (22-30)  mmol/L


 


BUN   23 H   (9-20)  mg/dL


 


Creatinine   0.61 L   (0.66-1.25)  mg/dL


 


Glucose   177 H   (74-99)  mg/dL


 


POC Glucose (mg/dL)     ()  mg/dL


 


Calcium   7.6 L   (8.4-10.2)  mg/dL














  07/14/24 Range/Units





  05:27 


 


RBC   (4.30-5.90)  m/uL


 


Hgb   (13.0-17.5)  gm/dL


 


Hct   (39.0-53.0)  %


 


MCV   (80.0-100.0)  fL


 


Macrocytosis   


 


ABG pH   (7.35-7.45)  


 


ABG pO2   ()  mmHg


 


ABG HCO3   (21-25)  mmol/L


 


ABG O2 Saturation   (94-97)  %


 


Chloride   ()  mmol/L


 


Carbon Dioxide   (22-30)  mmol/L


 


BUN   (9-20)  mg/dL


 


Creatinine   (0.66-1.25)  mg/dL


 


Glucose   (74-99)  mg/dL


 


POC Glucose (mg/dL)  173 H  ()  mg/dL


 


Calcium   (8.4-10.2)  mg/dL








                      Microbiology - Last 24 Hours (Table)











 07/12/24 08:30 Gram Stain - Final





 Bronchoalviolar Lavage - Right Bronchial Washings Culture - Final


 


 07/12/24 08:30 Acid Fast Bacilli Smear - Preliminary





 Bronchoalviolar Lavage - Right 


 


 07/10/24 11:30 Blood Culture - Preliminary





 Blood 


 


 07/10/24 11:47 Blood Culture - Preliminary





 Blood 














Assessment and Plan


(1) Allergy to multiple antibiotics


Current Visit: Yes   Status: Acute   Code(s): Z88.1 - ALLERGY STATUS TO OTHER 

ANTIBIOTIC AGENTS   SNOMED Code(s): 024826446


   





(2) Leukocytosis


Current Visit: Yes   Status: Acute   Code(s): D72.829 - ELEVATED WHITE BLOOD 

CELL COUNT, UNSPECIFIED   SNOMED Code(s): 317940056


   





(3) Leg wound, right


Current Visit: Yes   Status: Acute   Code(s): S81.801A - UNSPECIFIED OPEN WOUND,

RIGHT LOWER LEG, INITIAL ENCOUNTER   SNOMED Code(s): 74980044133971339


   


Plan: 





1patient presented to hospital with mental status changes possibly fentanyl 

overdose patches has been removed patient also have chronic nonhealing wound to 

the right leg which has been there for many years with exposed hardware and 

likely concerning for osteomyelitis patient wound has increased in size compared

to 2-year ago when I saw the patient last with the hardware exposed and highly 

suspicious for possible osteomyelitis .


2local wound culture currently growing MRSA and bacteroids patient did have 

elevated sed rate of 85


3patient with multiple antibiotic ALLERGIES that would limit the number of 

antibiotic safe to use.


4patient benefit from removal of the infected hardware in the wound bed in 

order to completely heal this infection 


5patient did have resolution of his fever white count normalized, to continue 

with the daptomycin and Flagyl and monitor clinical course closely


Dictation was produced using dragon dictation software. please excuse any 

grammatical, word or spelling errors. 








Time with Patient: Less than 30
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
## 2024-07-14 NOTE — XR
EXAMINATION TYPE: XR chest 1V portable

 

DATE OF EXAM: 7/14/2024

 

COMPARISON: 7/13/2024

 

HISTORY: SOB, Follow Up

 

FINDINGS:

 

Indwelling tubes and catheters are unchanged.

 

No change in scattered bilateral opacities.  

 

Stable appearance of the cardio-mediastinal structures at this time.

 

Pleural effusion unchanged.

 

IMPRESSION:

1.  Stable portable chest.  Clinical correlation and follow up until resolution is recommended.
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
## 2024-07-14 NOTE — CONS
CONSULTATION



CHIEF COMPLAINT:

Aortic stenosis.



HISTORY OF PRESENT ILLNESS:

Mr. Spencer is a 73-year-old gentleman, who is admitted to hospital with confusion and

subsequently became hypoxic, developed respiratory failure and had to be intubated and

placed on vent.  As part of his evaluation, he underwent an echocardiogram that

revealed moderate aortic stenosis, due to which Cardiology had been consulted.  At the

time of my evaluation, he is intubated on vent and stable hemodynamically.  His history

is significant for hypertension, diabetes, dyslipidemia, coronary artery disease,

status post CABG with LIMA to LAD and venous graft to OM1 and 2.  Echocardiogram showed

moderate LV systolic dysfunction with an ejection fraction of 40% with hypokinetic

anterior wall.  There is mild pulmonary hypertension noted.  There is moderate mitral

annular calcification with moderate mitral regurgitation.  There is moderate-to-severe

aortic stenosis noted.



PAST MEDICAL HISTORY:

Significant for coronary artery disease, status post CABG, aortic stenosis,

hypertension, dyslipidemia, diabetes.



MEDICATIONS:

Medications at home included;

1. Flomax.

2. Lipitor.

3. Zyloprim.

4. Norvasc.

5. Glucotrol.

6. Zestril.

7. Lopressor.

8. Glucophage.

9. Norco.



ALLERGIES:

To penicillin and sulfa.



FAMILY HISTORY:

I am unable to obtain from the patient.



SOCIAL HISTORY:

I am unable to obtain from the patient.



REVIEW OF SYSTEMS:

I am unable to obtain from the patient.



PHYSICAL EXAMINATION:

GENERAL:  Comfortable at rest.

VITAL SIGNS:  Stable.

CHEST:  Reveals diminished air entry at the bases.

HEART:  Reveals first and second heart sounds.  No gallop.  Has a 4/5 ejection systolic

murmur at the right parasternal border.

ABDOMEN:  Soft.

EXTREMITIES:  Reveal bilateral mild edema and chronic skin changes.



LABORATORY DATA:

Labs show hemoglobin of 9.5, platelet count is 169.  Potassium is 4.4, creatinine is

0.6.



ASSESSMENT AND PLAN:

1. Moderate-to-severe aortic stenosis.

2. Vent requiring respiratory failure.

3. Hypertension.

4. Diabetes.

5. Dyslipidemia.

6. Coronary artery disease, status post prior bypass surgery.



PLAN:

From cardiac standpoint when the patient's clinical status improves, he needs to

undergo further evaluation for his aortic stenosis, and if necessary consider TAVR,

this is not an evaluation we are going to do during this hospitalization.



Thank you for consulting us to participate in the care of this pleasant gentleman.  We

will follow with you.





MMKIL / IJN: [id number] / Job#: [id number]

## 2024-07-15 LAB
ANION GAP SERPL CALC-SCNC: 4 MMOL/L
BUN SERPL-SCNC: 23 MG/DL (ref 9–20)
CALCIUM SPEC-MCNC: 7.6 MG/DL (ref 8.4–10.2)
CHLORIDE SERPL-SCNC: 119 MMOL/L (ref 98–107)
CO2 BLDA-SCNC: 23 MMOL/L (ref 19–24)
CO2 SERPL-SCNC: 21 MMOL/L (ref 22–30)
ERYTHROCYTE [DISTWIDTH] IN BLOOD BY AUTOMATED COUNT: 2.68 M/UL (ref 4.3–5.9)
ERYTHROCYTE [DISTWIDTH] IN BLOOD: 15.4 % (ref 11.5–15.5)
GLUCOSE BLD-MCNC: 175 MG/DL (ref 70–110)
GLUCOSE BLD-MCNC: 181 MG/DL (ref 70–110)
GLUCOSE BLD-MCNC: 183 MG/DL (ref 70–110)
GLUCOSE BLD-MCNC: 189 MG/DL (ref 70–110)
GLUCOSE BLD-MCNC: 198 MG/DL (ref 70–110)
GLUCOSE SERPL-MCNC: 200 MG/DL (ref 74–99)
HCO3 BLDA-SCNC: 22 MMOL/L (ref 21–25)
HCT VFR BLD AUTO: 29.2 % (ref 39–53)
HGB BLD-MCNC: 9.1 GM/DL (ref 13–17.5)
MCH RBC QN AUTO: 34.1 PG (ref 25–35)
MCHC RBC AUTO-ENTMCNC: 31.3 G/DL (ref 31–37)
MCV RBC AUTO: 109 FL (ref 80–100)
NUC CELL # FLD: 70 /UL
PCO2 BLDA: 47 MMHG (ref 35–45)
PH BLDA: 7.28 [PH] (ref 7.35–7.45)
PLATELET # BLD AUTO: 157 K/UL (ref 150–450)
PO2 BLDA: 118 MMHG (ref 83–108)
POTASSIUM SERPL-SCNC: 4.3 MMOL/L (ref 3.5–5.1)
SODIUM SERPL-SCNC: 144 MMOL/L (ref 137–145)
WBC # BLD AUTO: 8.1 K/UL (ref 3.8–10.6)

## 2024-07-15 RX ADMIN — IPRATROPIUM BROMIDE AND ALBUTEROL SULFATE SCH ML: .5; 3 SOLUTION RESPIRATORY (INHALATION) at 11:42

## 2024-07-15 RX ADMIN — MAGNESIUM SULFATE IN DEXTROSE ONE MLS/HR: 10 INJECTION, SOLUTION INTRAVENOUS at 06:38

## 2024-07-15 RX ADMIN — METOPROLOL SUCCINATE SCH: 25 TABLET, EXTENDED RELEASE ORAL at 10:33

## 2024-07-15 NOTE — XR
EXAMINATION TYPE: XR chest 1V portable

 

DATE OF EXAM: 7/15/2024

 

Comparison: 7/14/2024

 

Clinical History: 73-year-old male Tube placement

 

Findings:

ET tube satisfactory. NG tube sidehole at or just below the GE junction. Median sternotomy wires and 
post-CABG clips. Right right IJ CVC tip obscured by overlying EKG leads. Suspected in the lower right
 atrium. Heart mildly enlarged. Diffuse interstitial patchy airspace opacities persist possibly with 
slight worsening. Trace pleural effusion on the left.

 

 

Impression:

Diffuse interstitial and patchy airspace disease similar to slightly worsened. Possible trace left ef
fusion.

## 2024-07-15 NOTE — P.PN
Subjective


Progress Note Date: 07/15/24


The patient is a 73-year-old male who was admitted to the hospital with acute 

hypoxic respiratory failure.  Echocardiogram revealed mildly reduced LV function

at 40% with anterior wall hypokinesis as well as moderate to severe aortic 

stenosis.  Cardiology was consulted for aortic stenosis and plan of care.





Patient is currently sedated and intubated.





GENERAL: Well-appearing, well-nourished and in no acute distress.  Sedated on 

ventilator


NECK: Supple without JVD or thyromegaly.


LUNGS: Breath sounds diminished to auscultation bilaterally. Respiration equal 

and unlabored.  No wheezes, rales or rhonchi.


HEART: Regular rate and rhythm.  Systolic ejection murmur.  Rubs or gallops. S1 

and S2 heard.


EXTREMITIES: Normal range of motion, mild edema.  No clubbing or cyanosis. 

Peripheral pulses intact and strong.





TELEMETRY:


Sinus rhythm overnight





LABS:


WBC 8.1, hemoglobin 9.1, platelet 157, sodium 144, potassium 4.3, BUN 23, 

creatinine 0.62, magnesium 1.8





IMPRESSION:


Acute hypoxic respiratory failure 


Moderate to severe aortic stenosis


Cardiomyopathy, EF 40 to 45%


Hypertension


Diabetes


Dyslipidemia


History of CAD with prior CABG





PLAN:


Metoprolol succinate 25 mg daily for cardiomyopathy


Discontinue amlodipine and consider resuming ACE or ARB in the future


Continue supportive treatment


No plans for TAVR workup during this admission


 





I am dictating on behalf of Dr Jeffrey Mackay's history/physical and 

assessment/plan.











Objective





- Vital Signs


Vital signs: 


                                   Vital Signs











Temp  99.4 F   07/15/24 04:00


 


Pulse  89   07/15/24 07:50


 


Resp  24   07/15/24 07:00


 


BP  105/53   07/12/24 18:00


 


Pulse Ox  97   07/15/24 07:00


 


FiO2  35   07/15/24 07:37








                                 Intake & Output











 07/14/24 07/15/24 07/15/24





 18:59 06:59 18:59


 


Intake Total 2318.500 1941.446 218.498


 


Output Total 1430 1305 60


 


Balance 888.500 636.446 158.498


 


Intake:   


 


  IV 1072 716 56


 


    0.9 presure bags 72 66 6


 


    DAPTOmycin 450 mg In 50  





    Sodium Chloride 0.9% 50   





    ml @ 100 mls/hr IVPB Q24H   





    [facility] Rx#:[id number]   


 


    Sodium Chloride 0.9% 1, 750 550 50





    000 ml @ 50 mls/hr IV .   





    Q20H [facility] Rx#:[id number]   


 


    metroNIDAZOLE-NS  200 100 





    mg In Saline 1 100ml.bag   





    @ 100 mls/hr IVPB Q8HR   





    [facility] Rx#:[id number]   


 


  Intake, IV Titration 726.500 653.446 110.498





  Amount   


 


    Norepinephrine 8 mg In 282.207 241.884 110.498





    Sodium Chloride 0.9% 250   





    ml @ 0.18 MCG/KG/MIN 31.   





    765 mls/hr IV .Q8H8M [facility]   





    Rx#:[id number]   


 


    fentaNYL (PF). 1,000 mcg 185.288 128.136 





    In Sodium Chloride 0.9%   





    80 ml @ 0.5 MCG/KG/HR 4.   





    56 mls/hr IV .[id number] [facility]   





    Rx#:[id number]   


 


    propofoL 1,000 mg In 259.005 283.426 





    Empty Bag 1 bag @ 15 MCG/   





    KG/MIN 7.434 mls/hr IV .   





    [id number] [facility] Rx#:[id number]   


 


  Tube Feeding 460 572 52


 


  Other 60  


 


Output:   


 


  Urine 1430 1305 60


 


Other:   


 


  Voiding Method Indwelling Catheter Indwelling Catheter 








                       ABP, PAP, CO, CI - Last Documented











Arterial Blood Pressure        134/39

















- Labs


CBC & Chem 7: 


                                 07/15/24 04:00





                                 07/15/24 04:00


Labs: 


                  Abnormal Lab Results - Last 24 Hours (Table)











  07/14/24 07/14/24 07/15/24 Range/Units





  12:01 18:16 00:45 


 


RBC     (4.30-5.90)  m/uL


 


Hgb     (13.0-17.5)  gm/dL


 


Hct     (39.0-53.0)  %


 


MCV     (80.0-100.0)  fL


 


Macrocytosis     


 


ABG pH     (7.35-7.45)  


 


ABG pCO2     (35-45)  mmHg


 


ABG pO2     ()  mmHg


 


ABG O2 Saturation     (94-97)  %


 


Chloride     ()  mmol/L


 


Carbon Dioxide     (22-30)  mmol/L


 


BUN     (9-20)  mg/dL


 


Creatinine     (0.66-1.25)  mg/dL


 


Glucose     (74-99)  mg/dL


 


POC Glucose (mg/dL)  195 H  162 H  183 H  ()  mg/dL


 


Calcium     (8.4-10.2)  mg/dL














  07/15/24 07/15/24 07/15/24 Range/Units





  04:00 04:00 06:04 


 


RBC  2.68 L    (4.30-5.90)  m/uL


 


Hgb  9.1 L    (13.0-17.5)  gm/dL


 


Hct  29.2 L    (39.0-53.0)  %


 


MCV  109.0 H    (80.0-100.0)  fL


 


Macrocytosis  Marked A    


 


ABG pH    7.28 L  (7.35-7.45)  


 


ABG pCO2    47 H  (35-45)  mmHg


 


ABG pO2    118 H  ()  mmHg


 


ABG O2 Saturation    99.2 H  (94-97)  %


 


Chloride   119 H   ()  mmol/L


 


Carbon Dioxide   21 L   (22-30)  mmol/L


 


BUN   23 H   (9-20)  mg/dL


 


Creatinine   0.62 L   (0.66-1.25)  mg/dL


 


Glucose   200 H   (74-99)  mg/dL


 


POC Glucose (mg/dL)     ()  mg/dL


 


Calcium   7.6 L   (8.4-10.2)  mg/dL














  07/15/24 Range/Units





  06:20 


 


RBC   (4.30-5.90)  m/uL


 


Hgb   (13.0-17.5)  gm/dL


 


Hct   (39.0-53.0)  %


 


MCV   (80.0-100.0)  fL


 


Macrocytosis   


 


ABG pH   (7.35-7.45)  


 


ABG pCO2   (35-45)  mmHg


 


ABG pO2   ()  mmHg


 


ABG O2 Saturation   (94-97)  %


 


Chloride   ()  mmol/L


 


Carbon Dioxide   (22-30)  mmol/L


 


BUN   (9-20)  mg/dL


 


Creatinine   (0.66-1.25)  mg/dL


 


Glucose   (74-99)  mg/dL


 


POC Glucose (mg/dL)  175 H  ()  mg/dL


 


Calcium   (8.4-10.2)  mg/dL








                      Microbiology - Last 24 Hours (Table)











 07/12/24 08:30 Gram Stain - Final





 Bronchoalviolar Lavage - Right Bronchial Washings Culture - Final
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
## 2024-07-15 NOTE — P.PN
Subjective


Progress Note Date: 07/15/24


Principal diagnosis: 





Reason for follow-up is right leg wound and osteomyelitis





The patient is a 73-year-old  male with a past medical history 

significant for diabetes mellitus hypertension hyperlipidemia coronary disease 

prostate disorder, patient did have a history of previous injury to the right 

leg requiring operative repair with hardware and has been dealing with a 

nonhealing wound to the right anterior leg for few years presented to hospital 

mental status changes possible overdose on fentanyl patch with a worsening wound

to the right leg prompting this consultation.


On today's evaluation that is 07/15/2024,the patient has been afebrile patient 

is intubated on the vent FiO2 is currently stable at 35% no significant purulent

secretions through the ET patient has been tolerating his tube feeds no diarrhea

has been reported by the nursing staff.


Patient white to normal at 8.1, creatinine 0.62 bronchoscopy culture has been 

pending so far





Objective





- Vital Signs


Vital signs: 


                                   Vital Signs











Temp  99 F   07/15/24 12:00


 


Pulse  90   07/15/24 12:15


 


Resp  24   07/15/24 12:15


 


BP  105/53   07/12/24 18:00


 


Pulse Ox  97   07/15/24 12:15


 


FiO2  35   07/15/24 12:00








                                 Intake & Output











 07/14/24 07/15/24 07/15/24





 18:59 06:59 18:59


 


Intake Total 2318.500 2857.196 8489.131


 


Output Total 1430 1305 1460


 


Balance 888.500 636.446 -408.869


 


Intake:   


 


  IV 1072 716 436


 


    0.9 presure bags 72 66 36


 


    DAPTOmycin 450 mg In 50  





    Sodium Chloride 0.9% 50   





    ml @ 100 mls/hr IVPB Q24H   





    [facility] Rx#:[id number]   


 


    Sodium Chloride 0.9% 1, 750 550 300





    000 ml @ 50 mls/hr IV .   





    Q20H [facility] Rx#:[id number]   


 


    metroNIDAZOLE-NS  200 100 100





    mg In Saline 1 100ml.bag   





    @ 100 mls/hr IVPB Q8HR   





    [facility] Rx#:[id number]   


 


  Intake, IV Titration 726.500 653.446 273.131





  Amount   


 


    Norepinephrine 8 mg In 282.207 241.884 206.557





    Sodium Chloride 0.9% 250   





    ml @ 0.18 MCG/KG/MIN 31.   





    765 mls/hr IV .Q8H8M [facility]   





    Rx#:[id number]   


 


    Sodium Chloride 0.9% 1,   50





    000 ml @ 10 mls/hr IV .   





    Q24H [facility] Rx#:[id number]   


 


    fentaNYL (PF). 1,000 mcg 185.288 128.136 





    In Sodium Chloride 0.9%   





    80 ml @ 0.5 MCG/KG/HR 4.   





    56 mls/hr IV .[id number] [facility]   





    Rx#:[id number]   


 


    propofoL 1,000 mg In 259.005 283.426 16.574





    Empty Bag 1 bag @ 15 MCG/   





    KG/MIN 7.434 mls/hr IV .   





    [id number] [facility] Rx#:[id number]   


 


  Tube Feeding 460 572 312


 


  Other 60  30


 


Output:   


 


  Urine 1430 1305 1460


 


Other:   


 


  Voiding Method Indwelling Catheter Indwelling Catheter 








                       ABP, PAP, CO, CI - Last Documented











Arterial Blood Pressure        111/34

















- Exam





GENERAL DESCRIPTION: An elderly male intubated on the vent





RESPIRATORY SYSTEM: Unlabored breathing , decreased breath sounds at bases





HEART: S1 S2 regular rate and rhythm ,





ABDOMEN: Soft , no tenderness





EXTREMITIES: Right leg wound is currently dressed





- Labs


CBC & Chem 7: 


                                 07/15/24 04:00





                                 07/15/24 04:00


Labs: 


                  Abnormal Lab Results - Last 24 Hours (Table)











  07/14/24 07/15/24 07/15/24 Range/Units





  18:16 00:45 04:00 


 


RBC    2.68 L  (4.30-5.90)  m/uL


 


Hgb    9.1 L  (13.0-17.5)  gm/dL


 


Hct    29.2 L  (39.0-53.0)  %


 


MCV    109.0 H  (80.0-100.0)  fL


 


Macrocytosis    Marked A  


 


ABG pH     (7.35-7.45)  


 


ABG pCO2     (35-45)  mmHg


 


ABG pO2     ()  mmHg


 


ABG O2 Saturation     (94-97)  %


 


Chloride     ()  mmol/L


 


Carbon Dioxide     (22-30)  mmol/L


 


BUN     (9-20)  mg/dL


 


Creatinine     (0.66-1.25)  mg/dL


 


Glucose     (74-99)  mg/dL


 


POC Glucose (mg/dL)  162 H  183 H   ()  mg/dL


 


Calcium     (8.4-10.2)  mg/dL














  07/15/24 07/15/24 07/15/24 Range/Units





  04:00 06:04 06:20 


 


RBC     (4.30-5.90)  m/uL


 


Hgb     (13.0-17.5)  gm/dL


 


Hct     (39.0-53.0)  %


 


MCV     (80.0-100.0)  fL


 


Macrocytosis     


 


ABG pH   7.28 L   (7.35-7.45)  


 


ABG pCO2   47 H   (35-45)  mmHg


 


ABG pO2   118 H   ()  mmHg


 


ABG O2 Saturation   99.2 H   (94-97)  %


 


Chloride  119 H    ()  mmol/L


 


Carbon Dioxide  21 L    (22-30)  mmol/L


 


BUN  23 H    (9-20)  mg/dL


 


Creatinine  0.62 L    (0.66-1.25)  mg/dL


 


Glucose  200 H    (74-99)  mg/dL


 


POC Glucose (mg/dL)    175 H  ()  mg/dL


 


Calcium  7.6 L    (8.4-10.2)  mg/dL














  07/15/24 Range/Units





  12:18 


 


RBC   (4.30-5.90)  m/uL


 


Hgb   (13.0-17.5)  gm/dL


 


Hct   (39.0-53.0)  %


 


MCV   (80.0-100.0)  fL


 


Macrocytosis   


 


ABG pH   (7.35-7.45)  


 


ABG pCO2   (35-45)  mmHg


 


ABG pO2   ()  mmHg


 


ABG O2 Saturation   (94-97)  %


 


Chloride   ()  mmol/L


 


Carbon Dioxide   (22-30)  mmol/L


 


BUN   (9-20)  mg/dL


 


Creatinine   (0.66-1.25)  mg/dL


 


Glucose   (74-99)  mg/dL


 


POC Glucose (mg/dL)  198 H  ()  mg/dL


 


Calcium   (8.4-10.2)  mg/dL








                      Microbiology - Last 24 Hours (Table)











 07/12/24 08:30 Acid Fast Bacilli Smear - Preliminary





 Bronchoalviolar Lavage - Right 


 


 07/12/24 08:30 Gram Stain - Final





 Bronchoalviolar Lavage - Right Bronchial Washings Culture - Final














Assessment and Plan


(1) Allergy to multiple antibiotics


Current Visit: Yes   Status: Acute   Code(s): Z88.1 - ALLERGY STATUS TO OTHER 

ANTIBIOTIC AGENTS   SNOMED Code(s): 267445553


   





(2) Leukocytosis


Current Visit: Yes   Status: Acute   Code(s): D72.829 - ELEVATED WHITE BLOOD 

CELL COUNT, UNSPECIFIED   SNOMED Code(s): 605546242


   





(3) Leg wound, right


Current Visit: Yes   Status: Acute   Code(s): S81.801A - UNSPECIFIED OPEN WOUND,

RIGHT LOWER LEG, INITIAL ENCOUNTER   SNOMED Code(s): 72513016558260906


   


Plan: 





1patient presented to hospital with mental status changes possibly fentanyl 

overdose patches has been removed patient also have chronic nonhealing wound to 

the right leg which has been there for many years with exposed hardware and 

likely concerning for osteomyelitis patient wound has increased in size compared

to 2-year ago when I saw the patient last with the hardware exposed and highly 

suspicious for possible osteomyelitis .


2local wound culture currently growing MRSA and bacteroids patient did have 

elevated sed rate of 85


3patient with multiple antibiotic ALLERGIES that would limit the number of 

antibiotic safe to use.


4patient benefit from removal of the infected hardware in the wound bed in 

order to completely heal this infection 


5patient did have resolution of his fever white count normalized,


6/patient to continue with the daptomycin and Flagyl and continue supportive 

care


Family the bedside questions were answered


Dictation was produced using dragon dictation software. please excuse any 

grammatical, word or spelling errors. 








Time with Patient: Less than 30

## 2024-07-16 LAB
ANION GAP SERPL CALC-SCNC: 6 MMOL/L
BASOPHILS # BLD AUTO: 0 K/UL (ref 0–0.2)
BASOPHILS NFR BLD AUTO: 0 %
BUN SERPL-SCNC: 25 MG/DL (ref 9–20)
CALCIUM SPEC-MCNC: 7.5 MG/DL (ref 8.4–10.2)
CHLORIDE SERPL-SCNC: 117 MMOL/L (ref 98–107)
CO2 BLDA-SCNC: 26 MMOL/L (ref 19–24)
CO2 SERPL-SCNC: 21 MMOL/L (ref 22–30)
EOSINOPHIL # BLD AUTO: 0.2 K/UL (ref 0–0.7)
EOSINOPHIL NFR BLD AUTO: 2 %
ERYTHROCYTE [DISTWIDTH] IN BLOOD BY AUTOMATED COUNT: 2.75 M/UL (ref 4.3–5.9)
ERYTHROCYTE [DISTWIDTH] IN BLOOD: 15 % (ref 11.5–15.5)
GLUCOSE BLD-MCNC: 167 MG/DL (ref 70–110)
GLUCOSE BLD-MCNC: 176 MG/DL (ref 70–110)
GLUCOSE BLD-MCNC: 189 MG/DL (ref 70–110)
GLUCOSE BLD-MCNC: 233 MG/DL (ref 70–110)
GLUCOSE SERPL-MCNC: 169 MG/DL (ref 74–99)
HCO3 BLDA-SCNC: 24 MMOL/L (ref 21–25)
HCT VFR BLD AUTO: 29.5 % (ref 39–53)
HGB BLD-MCNC: 8.9 GM/DL (ref 13–17.5)
LYMPHOCYTES # SPEC AUTO: 0.9 K/UL (ref 1–4.8)
LYMPHOCYTES NFR SPEC AUTO: 10 %
MAGNESIUM SPEC-SCNC: 1.6 MG/DL (ref 1.6–2.3)
MCH RBC QN AUTO: 32.5 PG (ref 25–35)
MCHC RBC AUTO-ENTMCNC: 30.3 G/DL (ref 31–37)
MCV RBC AUTO: 107.1 FL (ref 80–100)
MONOCYTES # BLD AUTO: 0.7 K/UL (ref 0–1)
MONOCYTES NFR BLD AUTO: 8 %
NEUTROPHILS # BLD AUTO: 7.1 K/UL (ref 1.3–7.7)
NEUTROPHILS NFR BLD AUTO: 78 %
PCO2 BLDA: 48 MMHG (ref 35–45)
PH BLDA: 7.32 [PH] (ref 7.35–7.45)
PLATELET # BLD AUTO: 173 K/UL (ref 150–450)
PO2 BLDA: 112 MMHG (ref 83–108)
POTASSIUM SERPL-SCNC: 4 MMOL/L (ref 3.5–5.1)
SODIUM SERPL-SCNC: 144 MMOL/L (ref 137–145)
WBC # BLD AUTO: 9.1 K/UL (ref 3.8–10.6)

## 2024-07-16 RX ADMIN — HYDROMORPHONE HYDROCHLORIDE PRN MG: 1 INJECTION, SOLUTION INTRAMUSCULAR; INTRAVENOUS; SUBCUTANEOUS at 16:43

## 2024-07-16 RX ADMIN — MAGNESIUM SULFATE IN DEXTROSE SCH MLS/HR: 10 INJECTION, SOLUTION INTRAVENOUS at 06:31

## 2024-07-16 RX ADMIN — METOPROLOL TARTRATE SCH MG: 25 TABLET, FILM COATED ORAL at 08:58
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
## 2024-07-16 NOTE — XR
EXAMINATION TYPE: XR chest 1V portable

 

DATE OF EXAM: 7/16/2024

 

Comparison: 7/15/2024

 

Clinical History: 73-year-old male mechanical ventilation

 

Findings:

ET and NG tubes satisfactory. Median sternotomy wires post-CABG clips. Healed left-sided rib fracture
 deformities. Heart is mildly enlarged. Diffuse interstitial and patchy opacities persist. Possible t
race left effusion.

 

 

Impression:

Mild cardiomegaly with diffuse interstitial and patchy opacities without significant change. Possible
 trace left effusion.

## 2024-07-16 NOTE — P.PN
Subjective


Progress Note Date: 07/16/24


Principal diagnosis: 





Reason for follow-up is right leg wound and osteomyelitis





The patient is a 73-year-old  male with a past medical history 

significant for diabetes mellitus hypertension hyperlipidemia coronary disease 

prostate disorder, patient did have a history of previous injury to the right 

leg requiring operative repair with hardware and has been dealing with a 

nonhealing wound to the right anterior leg for few years presented to hospital 

mental status changes possible overdose on fentanyl patch with a worsening wound

to the right leg prompting this consultation.


On today's evaluation that is 07/16/2024,the patient remains to be afebrile, 

patient is on ventilator FiO2 is currently stable at 30% no significant purulent

secretions through the ET or any other changes reported by nursing staff patient

has been tolerating tube feeds and no diarrhea has been reported.


Patient white count is 9.1, creatinine 0.54





Objective





- Vital Signs


Vital signs: 


                                   Vital Signs











Temp  98.8 F   07/16/24 12:00


 


Pulse  74   07/16/24 15:00


 


Resp  24   07/16/24 15:00


 


BP  134/59   07/16/24 14:15


 


Pulse Ox  97   07/16/24 15:00


 


FiO2  30   07/16/24 15:12








                                 Intake & Output











 07/15/24 07/16/24 07/16/24





 18:59 06:59 18:59


 


Intake Total 2074.160 9376.721 1460.834


 


Output Total 2135 2285 1685


 


Balance -60.840 -305.929 -67.166


 


Weight 90.7 kg  


 


Intake:   


 


   772 704


 


    0.9 presure bags 72 72 54


 


    DAPTOmycin 450 mg In 50  





    Sodium Chloride 0.9% 50   





    ml @ 100 mls/hr IVPB Q24H   





    [facility] Rx#:[id number]   


 


    Magnesium Sulfate-D5w Pmx   100





    1 gm In Dextrose/Water 1   





    100ml.bag @ 100 mls/hr   





    IVPB ONCE ONE Rx#:   





    [id number]   


 


    Sodium Chloride 0.9% 1, 500 600 450





    000 ml @ 50 mls/hr IV .   





    Q20H [facility] Rx#:[id number]   


 


    metroNIDAZOLE-NS  200 100 100





    mg In Saline 1 100ml.bag   





    @ 100 mls/hr IVPB Q8HR   





    [facility] Rx#:[id number]   


 


  Intake, IV Titration 598.160 583.071 415.834





  Amount   


 


    Magnesium Sulfate-D5w Pmx   100





    1 gm In Dextrose/Water 1   





    100ml.bag @ 100 mls/hr   





    IVPB Q1H [facility] Rx#:   





    [id number]   


 


    Norepinephrine 8 mg In 371.586 251.589 137.648





    Sodium Chloride 0.9% 250   





    ml @ 0.18 MCG/KG/MIN 31.   





    765 mls/hr IV .Q8H8M [facility]   





    Rx#:[id number]   


 


    Sodium Chloride 0.9% 1, 110 20 





    000 ml @ 10 mls/hr IV .   





    Q24H [facility] Rx#:[id number]   


 


    fentaNYL (PF). 1,000 mcg  60.572 63.992





    In Sodium Chloride 0.9%   





    80 ml @ 0.5 MCG/KG/HR 4.   





    56 mls/hr IV .[id number] [facility]   





    Rx#:[id number]   


 


    propofoL 1,000 mg In 116.574 250.910 114.194





    Empty Bag 1 bag @ 15 MCG/   





    KG/MIN 7.434 mls/hr IV .   





    [id number] [facility] Rx#:[id number]   


 


  Tube Feeding 624 624 468


 


  Other 30  30


 


Output:   


 


  Urine 2135 2285 1685


 


Other:   


 


  Voiding Method Indwelling Catheter Indwelling Catheter Indwelling Catheter








                       ABP, PAP, CO, CI - Last Documented











Arterial Blood Pressure        119/35

















- Exam





GENERAL DESCRIPTION: An elderly male intubated on the vent





RESPIRATORY SYSTEM: Unlabored breathing , decreased breath sounds at bases





HEART: S1 S2 regular rate and rhythm ,





ABDOMEN: Soft , no tenderness





EXTREMITIES: Right leg wound is currently dressed





- Labs


CBC & Chem 7: 


                                 07/16/24 04:30





                                 07/16/24 04:30


Labs: 


                  Abnormal Lab Results - Last 24 Hours (Table)











  07/15/24 07/15/24 07/16/24 Range/Units





  17:46 23:44 04:30 


 


RBC     (4.30-5.90)  m/uL


 


Hgb     (13.0-17.5)  gm/dL


 


Hct     (39.0-53.0)  %


 


MCV     (80.0-100.0)  fL


 


MCHC     (31.0-37.0)  g/dL


 


Lymphocytes #     (1.0-4.8)  k/uL


 


Macrocytosis     


 


ABG pH     (7.35-7.45)  


 


ABG pCO2     (35-45)  mmHg


 


ABG pO2     ()  mmHg


 


ABG Total CO2     (19-24)  mmol/L


 


ABG O2 Saturation     (94-97)  %


 


Chloride    117 H  ()  mmol/L


 


Carbon Dioxide    21 L  (22-30)  mmol/L


 


BUN    25 H  (9-20)  mg/dL


 


Creatinine    0.54 L  (0.66-1.25)  mg/dL


 


Glucose    169 H  (74-99)  mg/dL


 


POC Glucose (mg/dL)  189 H  181 H   ()  mg/dL


 


Calcium    7.5 L  (8.4-10.2)  mg/dL














  07/16/24 07/16/24 07/16/24 Range/Units





  04:30 05:29 06:22 


 


RBC  2.75 L    (4.30-5.90)  m/uL


 


Hgb  8.9 L    (13.0-17.5)  gm/dL


 


Hct  29.5 L    (39.0-53.0)  %


 


MCV  107.1 H    (80.0-100.0)  fL


 


MCHC  30.3 L    (31.0-37.0)  g/dL


 


Lymphocytes #  0.9 L    (1.0-4.8)  k/uL


 


Macrocytosis  Marked A    


 


ABG pH    7.32 L  (7.35-7.45)  


 


ABG pCO2    48 H  (35-45)  mmHg


 


ABG pO2    112 H  ()  mmHg


 


ABG Total CO2    26 H  (19-24)  mmol/L


 


ABG O2 Saturation    99.0 H  (94-97)  %


 


Chloride     ()  mmol/L


 


Carbon Dioxide     (22-30)  mmol/L


 


BUN     (9-20)  mg/dL


 


Creatinine     (0.66-1.25)  mg/dL


 


Glucose     (74-99)  mg/dL


 


POC Glucose (mg/dL)   176 H   ()  mg/dL


 


Calcium     (8.4-10.2)  mg/dL














  07/16/24 Range/Units





  11:55 


 


RBC   (4.30-5.90)  m/uL


 


Hgb   (13.0-17.5)  gm/dL


 


Hct   (39.0-53.0)  %


 


MCV   (80.0-100.0)  fL


 


MCHC   (31.0-37.0)  g/dL


 


Lymphocytes #   (1.0-4.8)  k/uL


 


Macrocytosis   


 


ABG pH   (7.35-7.45)  


 


ABG pCO2   (35-45)  mmHg


 


ABG pO2   ()  mmHg


 


ABG Total CO2   (19-24)  mmol/L


 


ABG O2 Saturation   (94-97)  %


 


Chloride   ()  mmol/L


 


Carbon Dioxide   (22-30)  mmol/L


 


BUN   (9-20)  mg/dL


 


Creatinine   (0.66-1.25)  mg/dL


 


Glucose   (74-99)  mg/dL


 


POC Glucose (mg/dL)  233 H  ()  mg/dL


 


Calcium   (8.4-10.2)  mg/dL








                      Microbiology - Last 24 Hours (Table)











 07/10/24 11:30 Blood Culture - Final





 Blood 


 


 07/10/24 11:47 Blood Culture - Final





 Blood 














Assessment and Plan


(1) Allergy to multiple antibiotics


Current Visit: Yes   Status: Acute   Code(s): Z88.1 - ALLERGY STATUS TO OTHER 

ANTIBIOTIC AGENTS   SNOMED Code(s): 555953644


   





(2) Leukocytosis


Current Visit: Yes   Status: Acute   Code(s): D72.829 - ELEVATED WHITE BLOOD 

CELL COUNT, UNSPECIFIED   SNOMED Code(s): 400457624


   





(3) Leg wound, right


Current Visit: Yes   Status: Acute   Code(s): S81.801A - UNSPECIFIED OPEN WOUND,

RIGHT LOWER LEG, INITIAL ENCOUNTER   SNOMED Code(s): 22005077532962329


   


Plan: 





1patient presented to hospital with mental status changes possibly fentanyl 

overdose patches has been removed patient also have chronic nonhealing wound to 

the right leg which has been there for many years with exposed hardware and 

likely concerning for osteomyelitis patient wound has increased in size compared

to 2-year ago when I saw the patient last with the hardware exposed and highly 

suspicious for possible osteomyelitis .


2local wound culture currently growing MRSA and bacteroids patient did have 

elevated sed rate of 85


3patient with multiple antibiotic ALLERGIES that would limit the number of 

antibiotic safe to use.


4patient benefit from removal of the infected hardware in the wound bed in 

order to completely heal this infection x-rays were reviewed with the family as 

well as with the radiologist and more likely the upper screw that is visible 

will discuss with orthopedics see if they can remove that screw and help heal 

this infection


5patient did have resolution of his fever white count normalized, will continue

with the daptomycin and Flagyl





Family the bedside questions were answered


Dictation was produced using dragon dictation software. please excuse any 

grammatical, word or spelling errors. 








Time with Patient: Less than 30

## 2024-07-16 NOTE — P.PN
Subjective


Progress Note Date: 07/16/24


The patient is a 73-year-old male who was admitted to the hospital with acute 

hypoxic respiratory failure.  Echocardiogram revealed mildly reduced LV function

at 40% with anterior wall hypokinesis as well as moderate to severe aortic 

stenosis.  Cardiology was consulted for aortic stenosis and plan of care.  

Yesterday the patient was started on oral beta-blocker for cardiomyopathy.





Patient is currently sedated and intubated.  He remains on low-dose 

vasopressors.  Pulmonary medicine predicts poor prognosis in regards to weaning 

from ventilator and there will be discussion regarding tracheostomy and PEG tube

placement.





GENERAL: Well-appearing, well-nourished and in no acute distress.  Sedated on 

ventilator


NECK: Supple without JVD or thyromegaly.


LUNGS: Breath sounds coarse to auscultation bilaterally. Respiration equal and 

unlabored. 


HEART: Regular rate and rhythm.  Systolic ejection murmur.  Rubs or gallops. S1 

and S2 heard.


EXTREMITIES: Normal range of motion, mild edema.  No clubbing or cyanosis. 

Peripheral pulses intact and strong.





TELEMETRY:


Sinus rhythm overnight





LABS:


WBC 9.1, hemoglobin 8.9, hematocrit 29.5, platelet 173, sodium 144, potassium 

4.0, BUN 25, creatinine 0.54, magnesium 1.6





IMPRESSION:


Acute hypoxic respiratory failure 


Moderate to severe aortic stenosis


Cardiomyopathy, EF 40 to 45%


Hypertension


Diabetes


Dyslipidemia


History of CAD with prior CABG





PLAN:


Metoprolol tartrate 12.5 mg daily for cardiomyopathy


Continue supportive treatment


No further recommendations from the cardiac standpoint





I am dictating on behalf of Dr Jeffrey Mackay's history/physical and 

assessment/plan.








Objective





- Vital Signs


Vital signs: 


                                   Vital Signs











Temp  98.6 F   07/16/24 08:00


 


Pulse  110 H  07/16/24 09:00


 


Resp  24   07/16/24 09:00


 


BP  142/66   07/16/24 08:45


 


Pulse Ox  96   07/16/24 09:00


 


FiO2  35   07/16/24 08:00








                                 Intake & Output











 07/15/24 07/16/24 07/16/24





 18:59 06:59 18:59


 


Intake Total 2074.160 1979.071 717.454


 


Output Total 2135 2285 370


 


Balance -60.840 -305.929 347.454


 


Weight 90.7 kg  


 


Intake:   


 


   772 368


 


    0.9 presure bags 72 72 18


 


    DAPTOmycin 450 mg In 50  





    Sodium Chloride 0.9% 50   





    ml @ 100 mls/hr IVPB Q24H   





    [facility] Rx#:[id number]   


 


    Magnesium Sulfate-D5w Pmx   100





    1 gm In Dextrose/Water 1   





    100ml.bag @ 100 mls/hr   





    IVPB ONCE ONE Rx#:   





    [id number]   


 


    Sodium Chloride 0.9% 1, 500 600 150





    000 ml @ 50 mls/hr IV .   





    Q20H [facility] Rx#:[id number]   


 


    metroNIDAZOLE-NS  200 100 100





    mg In Saline 1 100ml.bag   





    @ 100 mls/hr IVPB Q8HR   





    [facility] Rx#:[id number]   


 


  Intake, IV Titration 598.160 583.071 193.454





  Amount   


 


    Magnesium Sulfate-D5w Pmx   100





    1 gm In Dextrose/Water 1   





    100ml.bag @ 100 mls/hr   





    IVPB Q1H [facility] Rx#:   





    [id number]   


 


    Norepinephrine 8 mg In 371.586 251.589 26.383





    Sodium Chloride 0.9% 250   





    ml @ 0.18 MCG/KG/MIN 31.   





    765 mls/hr IV .Q8H8M [facility]   





    Rx#:[id number]   


 


    Sodium Chloride 0.9% 1, 110 20 





    000 ml @ 10 mls/hr IV .   





    Q24H [facility] Rx#:[id number]   


 


    fentaNYL (PF). 1,000 mcg  60.572 





    In Sodium Chloride 0.9%   





    80 ml @ 0.5 MCG/KG/HR 4.   





    56 mls/hr IV .[id number] [facility]   





    Rx#:[id number]   


 


    propofoL 1,000 mg In 116.574 250.910 67.071





    Empty Bag 1 bag @ 15 MCG/   





    KG/MIN 7.434 mls/hr IV .   





    [id number] [facility] Rx#:[id number]   


 


  Tube Feeding 624 624 156


 


  Other 30  


 


Output:   


 


  Urine 2135 2285 370


 


Other:   


 


  Voiding Method Indwelling Catheter Indwelling Catheter 








                       ABP, PAP, CO, CI - Last Documented











Arterial Blood Pressure        113/39

















- Labs


CBC & Chem 7: 


                                 07/16/24 04:30





                                 07/16/24 04:30


Labs: 


                  Abnormal Lab Results - Last 24 Hours (Table)











  07/15/24 07/15/24 07/15/24 Range/Units





  12:18 17:46 23:44 


 


RBC     (4.30-5.90)  m/uL


 


Hgb     (13.0-17.5)  gm/dL


 


Hct     (39.0-53.0)  %


 


MCV     (80.0-100.0)  fL


 


MCHC     (31.0-37.0)  g/dL


 


Lymphocytes #     (1.0-4.8)  k/uL


 


Macrocytosis     


 


ABG pH     (7.35-7.45)  


 


ABG pCO2     (35-45)  mmHg


 


ABG pO2     ()  mmHg


 


ABG Total CO2     (19-24)  mmol/L


 


ABG O2 Saturation     (94-97)  %


 


Chloride     ()  mmol/L


 


Carbon Dioxide     (22-30)  mmol/L


 


BUN     (9-20)  mg/dL


 


Creatinine     (0.66-1.25)  mg/dL


 


Glucose     (74-99)  mg/dL


 


POC Glucose (mg/dL)  198 H  189 H  181 H  ()  mg/dL


 


Calcium     (8.4-10.2)  mg/dL














  07/16/24 07/16/24 07/16/24 Range/Units





  04:30 04:30 05:29 


 


RBC   2.75 L   (4.30-5.90)  m/uL


 


Hgb   8.9 L   (13.0-17.5)  gm/dL


 


Hct   29.5 L   (39.0-53.0)  %


 


MCV   107.1 H   (80.0-100.0)  fL


 


MCHC   30.3 L   (31.0-37.0)  g/dL


 


Lymphocytes #   0.9 L   (1.0-4.8)  k/uL


 


Macrocytosis   Marked A   


 


ABG pH     (7.35-7.45)  


 


ABG pCO2     (35-45)  mmHg


 


ABG pO2     ()  mmHg


 


ABG Total CO2     (19-24)  mmol/L


 


ABG O2 Saturation     (94-97)  %


 


Chloride  117 H    ()  mmol/L


 


Carbon Dioxide  21 L    (22-30)  mmol/L


 


BUN  25 H    (9-20)  mg/dL


 


Creatinine  0.54 L    (0.66-1.25)  mg/dL


 


Glucose  169 H    (74-99)  mg/dL


 


POC Glucose (mg/dL)    176 H  ()  mg/dL


 


Calcium  7.5 L    (8.4-10.2)  mg/dL














  07/16/24 Range/Units





  06:22 


 


RBC   (4.30-5.90)  m/uL


 


Hgb   (13.0-17.5)  gm/dL


 


Hct   (39.0-53.0)  %


 


MCV   (80.0-100.0)  fL


 


MCHC   (31.0-37.0)  g/dL


 


Lymphocytes #   (1.0-4.8)  k/uL


 


Macrocytosis   


 


ABG pH  7.32 L  (7.35-7.45)  


 


ABG pCO2  48 H  (35-45)  mmHg


 


ABG pO2  112 H  ()  mmHg


 


ABG Total CO2  26 H  (19-24)  mmol/L


 


ABG O2 Saturation  99.0 H  (94-97)  %


 


Chloride   ()  mmol/L


 


Carbon Dioxide   (22-30)  mmol/L


 


BUN   (9-20)  mg/dL


 


Creatinine   (0.66-1.25)  mg/dL


 


Glucose   (74-99)  mg/dL


 


POC Glucose (mg/dL)   ()  mg/dL


 


Calcium   (8.4-10.2)  mg/dL








                      Microbiology - Last 24 Hours (Table)











 07/10/24 11:30 Blood Culture - Final





 Blood 


 


 07/10/24 11:47 Blood Culture - Final





 Blood 


 


 07/12/24 08:30 Acid Fast Bacilli Smear - Preliminary





 Bronchoalviolar Lavage - Right 


 


 07/12/24 08:30 Gram Stain - Final





 Bronchoalviolar Lavage - Right Bronchial Washings Culture - Final

## 2024-07-17 LAB
ANION GAP SERPL CALC-SCNC: 5 MMOL/L
BASOPHILS # BLD AUTO: 0 K/UL (ref 0–0.2)
BASOPHILS NFR BLD AUTO: 0 %
BUN SERPL-SCNC: 26 MG/DL (ref 9–20)
CALCIUM SPEC-MCNC: 7.7 MG/DL (ref 8.4–10.2)
CHLORIDE SERPL-SCNC: 114 MMOL/L (ref 98–107)
CO2 BLDA-SCNC: 28 MMOL/L (ref 19–24)
CO2 SERPL-SCNC: 24 MMOL/L (ref 22–30)
EOSINOPHIL # BLD AUTO: 0.3 K/UL (ref 0–0.7)
EOSINOPHIL NFR BLD AUTO: 3 %
ERYTHROCYTE [DISTWIDTH] IN BLOOD BY AUTOMATED COUNT: 2.76 M/UL (ref 4.3–5.9)
ERYTHROCYTE [DISTWIDTH] IN BLOOD: 15.1 % (ref 11.5–15.5)
GLUCOSE BLD-MCNC: 166 MG/DL (ref 70–110)
GLUCOSE BLD-MCNC: 188 MG/DL (ref 70–110)
GLUCOSE BLD-MCNC: 194 MG/DL (ref 70–110)
GLUCOSE BLD-MCNC: 198 MG/DL (ref 70–110)
GLUCOSE BLD-MCNC: 216 MG/DL (ref 70–110)
GLUCOSE SERPL-MCNC: 157 MG/DL (ref 74–99)
HCO3 BLDA-SCNC: 27 MMOL/L (ref 21–25)
HCT VFR BLD AUTO: 29.9 % (ref 39–53)
HGB BLD-MCNC: 9.1 GM/DL (ref 13–17.5)
LYMPHOCYTES # SPEC AUTO: 1.2 K/UL (ref 1–4.8)
LYMPHOCYTES NFR SPEC AUTO: 12 %
MAGNESIUM SPEC-SCNC: 1.6 MG/DL (ref 1.6–2.3)
MCH RBC QN AUTO: 33.1 PG (ref 25–35)
MCHC RBC AUTO-ENTMCNC: 30.5 G/DL (ref 31–37)
MCV RBC AUTO: 108.5 FL (ref 80–100)
MONOCYTES # BLD AUTO: 0.8 K/UL (ref 0–1)
MONOCYTES NFR BLD AUTO: 8 %
NEUTROPHILS # BLD AUTO: 7.8 K/UL (ref 1.3–7.7)
NEUTROPHILS NFR BLD AUTO: 75 %
PCO2 BLDA: 44 MMHG (ref 35–45)
PH BLDA: 7.39 [PH] (ref 7.35–7.45)
PLATELET # BLD AUTO: 211 K/UL (ref 150–450)
PO2 BLDA: 97 MMHG (ref 83–108)
POTASSIUM SERPL-SCNC: 3.8 MMOL/L (ref 3.5–5.1)
SODIUM SERPL-SCNC: 143 MMOL/L (ref 137–145)
WBC # BLD AUTO: 10.4 K/UL (ref 3.8–10.6)

## 2024-07-17 RX ADMIN — MAGNESIUM SULFATE IN DEXTROSE SCH MLS/HR: 10 INJECTION, SOLUTION INTRAVENOUS at 08:02

## 2024-07-17 RX ADMIN — SENNOSIDES SCH MG: 8.6 TABLET, FILM COATED ORAL at 20:45
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
## 2024-07-17 NOTE — XR
EXAMINATION TYPE: XR chest 1V portable

 

DATE OF EXAM: 7/17/2024

 

Comparison: 7/16/2024

 

Clinical History: 73-year-old male mechanical ventilation

 

Findings:

ET and NG tubes are satisfactory. Median sternotomy wires. Right IJ CVC tip in the lower right atrium
. Heart mildly enlarged. Diffuse interstitial and patchy opacities persist, overall similar. Possible
 small left effusion also persists.

 

 

Impression:

Ongoing CHF with interstitial and patchy pulmonary edema. Possible small left pleural effusion.
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
## 2024-07-18 LAB
ANION GAP SERPL CALC-SCNC: 4 MMOL/L
BASOPHILS # BLD AUTO: 0.1 K/UL (ref 0–0.2)
BASOPHILS NFR BLD AUTO: 0 %
BUN SERPL-SCNC: 31 MG/DL (ref 9–20)
CALCIUM SPEC-MCNC: 7.6 MG/DL (ref 8.4–10.2)
CHLORIDE SERPL-SCNC: 113 MMOL/L (ref 98–107)
CO2 BLDA-SCNC: 29 MMOL/L (ref 19–24)
CO2 BLDA-SCNC: 29 MMOL/L (ref 19–24)
CO2 SERPL-SCNC: 24 MMOL/L (ref 22–30)
EOSINOPHIL # BLD AUTO: 0.2 K/UL (ref 0–0.7)
EOSINOPHIL NFR BLD AUTO: 1 %
ERYTHROCYTE [DISTWIDTH] IN BLOOD BY AUTOMATED COUNT: 2.78 M/UL (ref 4.3–5.9)
ERYTHROCYTE [DISTWIDTH] IN BLOOD: 15.5 % (ref 11.5–15.5)
GLUCOSE BLD-MCNC: 151 MG/DL (ref 70–110)
GLUCOSE BLD-MCNC: 222 MG/DL (ref 70–110)
GLUCOSE BLD-MCNC: 240 MG/DL (ref 70–110)
GLUCOSE SERPL-MCNC: 213 MG/DL (ref 74–99)
HCO3 BLDA-SCNC: 27 MMOL/L (ref 21–25)
HCO3 BLDA-SCNC: 28 MMOL/L (ref 21–25)
HCT VFR BLD AUTO: 29.6 % (ref 39–53)
HGB BLD-MCNC: 9.1 GM/DL (ref 13–17.5)
LYMPHOCYTES # SPEC AUTO: 1.3 K/UL (ref 1–4.8)
LYMPHOCYTES NFR SPEC AUTO: 9 %
MAGNESIUM SPEC-SCNC: 1.7 MG/DL (ref 1.6–2.3)
MCH RBC QN AUTO: 32.8 PG (ref 25–35)
MCHC RBC AUTO-ENTMCNC: 30.9 G/DL (ref 31–37)
MCV RBC AUTO: 106.3 FL (ref 80–100)
MONOCYTES # BLD AUTO: 1.3 K/UL (ref 0–1)
MONOCYTES NFR BLD AUTO: 9 %
NEUTROPHILS # BLD AUTO: 11.5 K/UL (ref 1.3–7.7)
NEUTROPHILS NFR BLD AUTO: 79 %
PCO2 BLDA: 45 MMHG (ref 35–45)
PCO2 BLDA: 49 MMHG (ref 35–45)
PH BLDA: 7.36 [PH] (ref 7.35–7.45)
PH BLDA: 7.39 [PH] (ref 7.35–7.45)
PLATELET # BLD AUTO: 240 K/UL (ref 150–450)
PO2 BLDA: 107 MMHG (ref 83–108)
PO2 BLDA: 83 MMHG (ref 83–108)
POTASSIUM SERPL-SCNC: 3.9 MMOL/L (ref 3.5–5.1)
SODIUM SERPL-SCNC: 141 MMOL/L (ref 137–145)
WBC # BLD AUTO: 14.5 K/UL (ref 3.8–10.6)

## 2024-07-18 RX ADMIN — MAGNESIUM SULFATE IN DEXTROSE ONE MLS/HR: 10 INJECTION, SOLUTION INTRAVENOUS at 05:36

## 2024-07-18 RX ADMIN — POTASSIUM BICARBONATE SCH MEQ: 782 TABLET, EFFERVESCENT ORAL at 05:36

## 2024-07-18 RX ADMIN — SCOPALAMINE SCH PATCH: 1 PATCH, EXTENDED RELEASE TRANSDERMAL at 14:40

## 2024-07-18 NOTE — XR
EXAMINATION TYPE: XR chest 1V portable

 

DATE OF EXAM: 7/18/2024

 

Comparison: 7/17/2024

 

Clinical History: 73-year-old male mechanical ventilation

 

Findings:

ET and NG tubes are satisfactory. Median sternotomy wires with post-CABG clips. Heart remains enlarge
d with diffuse patchy and interstitial opacities. Suspect small effusions. No significant change. Rig
ht IJ CVC tip in the lower right atrium.

 

 

Impression:

Ongoing diffuse bilateral patchy pulmonary edema with small effusions.
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
## 2024-07-18 NOTE — P.PN
Subjective


Progress Note Date: 07/18/24


Principal diagnosis: 





Reason for follow-up is right leg wound and osteomyelitis





The patient is a 73-year-old  male with a past medical history 

significant for diabetes mellitus hypertension hyperlipidemia coronary disease 

prostate disorder, patient did have a history of previous injury to the right 

leg requiring operative repair with hardware and has been dealing with a 

nonhealing wound to the right anterior leg for few years presented to hospital 

mental status changes possible overdose on fentanyl patch with a worsening wound

to the right leg prompting this consultation.


On today's evaluation that is 07/18/2024, Patient did spike a fever of 101.5 F 

yesterday afternoon the patient is afebrile this morning patient has been 

extubated and is currently on 4 L nasal cannula oxygen patient is lethargic 

unable to provide any history of the pressor support no vomiting or diarrhea has

been reported.


Patient white count is 14.5 creatinine 0.56





Objective





- Vital Signs


Vital signs: 


                                   Vital Signs











Temp  98.4 F   07/18/24 12:00


 


Pulse  93   07/18/24 14:15


 


Resp  18   07/18/24 14:15


 


BP  136/69   07/18/24 14:15


 


Pulse Ox  99   07/18/24 14:15


 


FiO2  30   07/18/24 11:57








                                 Intake & Output











 07/17/24 07/18/24 07/18/24





 18:59 06:59 18:59


 


Intake Total 2494.907 3288.593 1349.445


 


Output Total 1795 1505 1560


 


Balance 114.903 120.593 -210.555


 


Weight  93 kg 93 kg


 


Intake:   


 


   672 554


 


    0.9 presure bags 72 72 54


 


    DAPTOmycin 450 mg In 50  





    Sodium Chloride 0.9% 50   





    ml @ 100 mls/hr IVPB Q24H   





    DAVID Rx#:[id number]   


 


    Magnesium Sulfate-D5w Pmx 200  





    1 gm In Dextrose/Water 1   





    100ml.bag @ 100 mls/hr   





    IVPB Q1H DAVID Rx#:   





    [id number]   


 


    Sodium Chloride 0.9% 1, 500 600 400





    000 ml @ 50 mls/hr IV .   





    Q20H DAVID Rx#:[id number]   


 


    metroNIDAZOLE-NS  100  100





    mg In Saline 1 100ml.bag   





    @ 100 mls/hr IVPB Q8HR   





    DAVID Rx#:[id number]   


 


  Intake, IV Titration 193.903 239.593 153.445





  Amount   


 


    Norepinephrine 8 mg In 31.705 155.589 65.354





    Sodium Chloride 0.9% 250   





    ml @ 0.18 MCG/KG/MIN 31.   





    765 mls/hr IV .Q8H8M DAVID   





    Rx#:[id number]   


 


    propofoL 1,000 mg In 162.198 84.004 88.091





    Empty Bag 1 bag @ 15 MCG/   





    KG/MIN 7.434 mls/hr IV .   





    [id number] DAVID Rx#:[id number]   


 


  Oral 80  300


 


  Tube Feeding 624 624 312


 


  Other 90 90 30


 


Output:   


 


  Urine 1795 1505 1560


 


Other:   


 


  Voiding Method Indwelling Catheter Indwelling Catheter Indwelling Catheter








                       ABP, PAP, CO, CI - Last Documented











Arterial Blood Pressure        134/51

















- Exam





GENERAL DESCRIPTION: An elderly male intubated on the vent





RESPIRATORY SYSTEM: Unlabored breathing , decreased breath sounds at bases





HEART: S1 S2 regular rate and rhythm ,





ABDOMEN: Soft , no tenderness





EXTREMITIES: Right leg wound is currently dressed





- Labs


CBC & Chem 7: 


                                 07/18/24 04:47





                                 07/18/24 04:47


Labs: 


                  Abnormal Lab Results - Last 24 Hours (Table)











  07/17/24 07/17/24 07/18/24 Range/Units





  17:21 23:21 04:47 


 


WBC    14.5 H  (3.8-10.6)  k/uL


 


RBC    2.78 L  (4.30-5.90)  m/uL


 


Hgb    9.1 L  (13.0-17.5)  gm/dL


 


Hct    29.6 L  (39.0-53.0)  %


 


MCV    106.3 H  (80.0-100.0)  fL


 


MCHC    30.9 L  (31.0-37.0)  g/dL


 


Neutrophils #    11.5 H  (1.3-7.7)  k/uL


 


Monocytes #    1.3 H  (0-1.0)  k/uL


 


ABG pCO2     (35-45)  mmHg


 


ABG HCO3     (21-25)  mmol/L


 


ABG Total CO2     (19-24)  mmol/L


 


ABG O2 Saturation     (94-97)  %


 


Chloride     ()  mmol/L


 


BUN     (9-20)  mg/dL


 


Creatinine     (0.66-1.25)  mg/dL


 


Glucose     (74-99)  mg/dL


 


POC Glucose (mg/dL)  216 H  194 H   ()  mg/dL


 


Calcium     (8.4-10.2)  mg/dL














  07/18/24 07/18/24 07/18/24 Range/Units





  04:47 05:30 05:48 


 


WBC     (3.8-10.6)  k/uL


 


RBC     (4.30-5.90)  m/uL


 


Hgb     (13.0-17.5)  gm/dL


 


Hct     (39.0-53.0)  %


 


MCV     (80.0-100.0)  fL


 


MCHC     (31.0-37.0)  g/dL


 


Neutrophils #     (1.3-7.7)  k/uL


 


Monocytes #     (0-1.0)  k/uL


 


ABG pCO2     (35-45)  mmHg


 


ABG HCO3   27 H   (21-25)  mmol/L


 


ABG Total CO2   29 H   (19-24)  mmol/L


 


ABG O2 Saturation   98.8 H   (94-97)  %


 


Chloride  113 H    ()  mmol/L


 


BUN  31 H    (9-20)  mg/dL


 


Creatinine  0.56 L    (0.66-1.25)  mg/dL


 


Glucose  213 H    (74-99)  mg/dL


 


POC Glucose (mg/dL)    222 H  ()  mg/dL


 


Calcium  7.6 L    (8.4-10.2)  mg/dL














  07/18/24 07/18/24 Range/Units





  11:23 11:24 


 


WBC    (3.8-10.6)  k/uL


 


RBC    (4.30-5.90)  m/uL


 


Hgb    (13.0-17.5)  gm/dL


 


Hct    (39.0-53.0)  %


 


MCV    (80.0-100.0)  fL


 


MCHC    (31.0-37.0)  g/dL


 


Neutrophils #    (1.3-7.7)  k/uL


 


Monocytes #    (0-1.0)  k/uL


 


ABG pCO2  49 H   (35-45)  mmHg


 


ABG HCO3  28 H   (21-25)  mmol/L


 


ABG Total CO2  29 H   (19-24)  mmol/L


 


ABG O2 Saturation    (94-97)  %


 


Chloride    ()  mmol/L


 


BUN    (9-20)  mg/dL


 


Creatinine    (0.66-1.25)  mg/dL


 


Glucose    (74-99)  mg/dL


 


POC Glucose (mg/dL)   240 H  ()  mg/dL


 


Calcium    (8.4-10.2)  mg/dL














Assessment and Plan


(1) Allergy to multiple antibiotics


Current Visit: Yes   Status: Acute   Code(s): Z88.1 - ALLERGY STATUS TO OTHER 

ANTIBIOTIC AGENTS   SNOMED Code(s): 138249280


   





(2) Leukocytosis


Current Visit: Yes   Status: Acute   Code(s): D72.829 - ELEVATED WHITE BLOOD 

CELL COUNT, UNSPECIFIED   SNOMED Code(s): 455437799


   





(3) Leg wound, right


Current Visit: Yes   Status: Acute   Code(s): S81.801A - UNSPECIFIED OPEN WOUND,

RIGHT LOWER LEG, INITIAL ENCOUNTER   SNOMED Code(s): 30277139050447193


   


Plan: 





1patient presented to hospital with mental status changes possibly fentanyl 

overdose patches has been removed patient also have chronic nonhealing wound to 

the right leg which has been there for many years with exposed hardware and 

likely concerning for osteomyelitis patient wound has increased in size compared

to 2-year ago when I saw the patient last with the hardware exposed and highly 

suspicious for possible osteomyelitis .


2local wound culture currently growing MRSA and bacteroids patient did have 

elevated sed rate of 85


3patient with multiple antibiotic ALLERGIES that would limit the number of 

antibiotic safe to use.


4patient benefit from removal of the infected hardware in the wound bed in 

order to completely heal this infection x-rays were reviewed with the family as 

well as with the radiologist and more likely the upper screw that is visible 

will discuss with orthopedics see if they can remove that screw and help heal 

this infection if not patient may need to be referred to orthopedics at tertiary

care


5patient did have a new fever yesterday afebrile this morning white count is 

slightly up continue to monitor closely,  will continue with the daptomycin and 

Flagyl and monitor clinical course closely








Dictation was produced using dragon dictation software. please excuse any 

grammatical, word or spelling errors. 








Time with Patient: Less than 30
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
## 2024-07-19 LAB
ANION GAP SERPL CALC-SCNC: 5 MMOL/L
BUN SERPL-SCNC: 28 MG/DL (ref 9–20)
CALCIUM SPEC-MCNC: 7.9 MG/DL (ref 8.4–10.2)
CHLORIDE SERPL-SCNC: 111 MMOL/L (ref 98–107)
CO2 SERPL-SCNC: 27 MMOL/L (ref 22–30)
ERYTHROCYTE [DISTWIDTH] IN BLOOD BY AUTOMATED COUNT: 2.74 M/UL (ref 4.3–5.9)
ERYTHROCYTE [DISTWIDTH] IN BLOOD: 15.1 % (ref 11.5–15.5)
GLUCOSE BLD-MCNC: 134 MG/DL (ref 70–110)
GLUCOSE BLD-MCNC: 140 MG/DL (ref 70–110)
GLUCOSE BLD-MCNC: 179 MG/DL (ref 70–110)
GLUCOSE SERPL-MCNC: 139 MG/DL (ref 74–99)
HCT VFR BLD AUTO: 28.9 % (ref 39–53)
HGB BLD-MCNC: 9 GM/DL (ref 13–17.5)
MAGNESIUM SPEC-SCNC: 1.5 MG/DL (ref 1.6–2.3)
MCH RBC QN AUTO: 32.7 PG (ref 25–35)
MCHC RBC AUTO-ENTMCNC: 31.1 G/DL (ref 31–37)
MCV RBC AUTO: 105.2 FL (ref 80–100)
PLATELET # BLD AUTO: 270 K/UL (ref 150–450)
POTASSIUM SERPL-SCNC: 3.6 MMOL/L (ref 3.5–5.1)
SODIUM SERPL-SCNC: 143 MMOL/L (ref 137–145)
WBC # BLD AUTO: 11.4 K/UL (ref 3.8–10.6)

## 2024-07-19 RX ADMIN — IPRATROPIUM BROMIDE AND ALBUTEROL SULFATE SCH ML: .5; 3 SOLUTION RESPIRATORY (INHALATION) at 09:15

## 2024-07-19 RX ADMIN — POTASSIUM CHLORIDE SCH MLS/HR: 7.46 INJECTION, SOLUTION INTRAVENOUS at 06:08

## 2024-07-19 RX ADMIN — MAGNESIUM SULFATE IN DEXTROSE SCH MLS/HR: 10 INJECTION, SOLUTION INTRAVENOUS at 10:04

## 2024-07-19 RX ADMIN — POTASSIUM BICARBONATE SCH MEQ: 782 TABLET, EFFERVESCENT ORAL at 12:58

## 2024-07-19 RX ADMIN — PANTOPRAZOLE SODIUM SCH MG: 40 INJECTION, POWDER, FOR SOLUTION INTRAVENOUS at 11:53
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
## 2024-07-19 NOTE — P.PN
Subjective


Progress Note Date: 07/19/24


Principal diagnosis: 





Reason for follow-up is right leg wound and osteomyelitis





The patient is a 73-year-old  male with a past medical history 

significant for diabetes mellitus hypertension hyperlipidemia coronary disease 

prostate disorder, patient did have a history of previous injury to the right 

leg requiring operative repair with hardware and has been dealing with a 

nonhealing wound to the right anterior leg for few years presented to hospital 

mental status changes possible overdose on fentanyl patch with a worsening wound

to the right leg prompting this consultation.


On today's evaluation that is 07/19/2024, patient has been afebrile, patient is 

breathing comfortably and is currently on 2 L nasal cannula oxygen, patient is 

slightly lethargic and not good historian no vomiting or diarrhea reported by 

the nursing staff.


Patient white count is 11.4, creatinine 0.52





Objective





- Vital Signs


Vital signs: 


                                   Vital Signs











Temp  99.1 F   07/19/24 12:00


 


Pulse  86   07/19/24 13:00


 


Resp  21   07/19/24 13:00


 


BP  116/56   07/19/24 13:00


 


Pulse Ox  95   07/19/24 13:00


 


FiO2  30   07/18/24 11:57








                                 Intake & Output











 07/18/24 07/19/24 07/19/24





 18:59 06:59 18:59


 


Intake Total 1754.564 616 666


 


Output Total 2480 1725 3105


 


Balance -725.113 -7641 -3286


 


Weight 93 kg 93 kg 


 


Intake:   


 


   616 366


 


    0.9 presure bags 84 66 36


 


    Sodium Chloride 0.9% 1, 650 550 230





    000 ml @ 50 mls/hr IV .   





    Q20H DAVID Rx#:[id number]   


 


    metroNIDAZOLE-NS  100  100





    mg In Saline 1 100ml.bag   





    @ 100 mls/hr IVPB Q8HR   





    DAVID Rx#:[id number]   


 


  Intake, IV Titration 278.564  300





  Amount   


 


    Magnesium Sulfate-D5w Pmx   200





    1 gm In Dextrose/Water 1   





    100ml.bag @ 100 mls/hr   





    IVPB Q1H DAVID Rx#:   





    [id number]   


 


    Norepinephrine 8 mg In 190.473  





    Sodium Chloride 0.9% 250   





    ml @ 0.18 MCG/KG/MIN 31.   





    765 mls/hr IV .Q8H8M DAVID   





    Rx#:[id number]   


 


    Potassium Chloride 10 meq   100





    In Water For Injection 1   





    100ml.bag @ 100 mls/hr   





    IVPB Q1H DAVID Rx#:   





    [id number]   


 


    propofoL 1,000 mg In 88.091  





    Empty Bag 1 bag @ 15 MCG/   





    KG/MIN 7.434 mls/hr IV .   





    [id number] DAVID Rx#:[id number]   


 


  Oral 300  


 


  Tube Feeding 312  


 


  Other 30  


 


Output:   


 


  Gastric Drainage 400  2400


 


  Urine 2080 1725 705


 


Other:   


 


  Voiding Method Indwelling Catheter Indwelling Catheter 


 


  # Bowel Movements  0 








                       ABP, PAP, CO, CI - Last Documented











Arterial Blood Pressure        118/42

















- Exam





GENERAL DESCRIPTION: An elderly male lying in bed in no distress





RESPIRATORY SYSTEM: Unlabored breathing , decreased breath sounds at bases





HEART: S1 S2 regular rate and rhythm ,





ABDOMEN: Soft , no tenderness





EXTREMITIES: Right leg wound is currently dressed no drainage on the dressing





- Labs


CBC & Chem 7: 


                                 07/19/24 04:55





                                 07/19/24 11:59


Labs: 


                  Abnormal Lab Results - Last 24 Hours (Table)











  07/18/24 07/19/24 07/19/24 Range/Units





  17:58 00:18 04:55 


 


WBC    11.4 H  (3.8-10.6)  k/uL


 


RBC    2.74 L  (4.30-5.90)  m/uL


 


Hgb    9.0 L  (13.0-17.5)  gm/dL


 


Hct    28.9 L  (39.0-53.0)  %


 


MCV    105.2 H  (80.0-100.0)  fL


 


Chloride     ()  mmol/L


 


BUN     (9-20)  mg/dL


 


Creatinine     (0.66-1.25)  mg/dL


 


Glucose     (74-99)  mg/dL


 


POC Glucose (mg/dL)  151 H  140 H   ()  mg/dL


 


Calcium     (8.4-10.2)  mg/dL


 


Magnesium     (1.6-2.3)  mg/dL














  07/19/24 07/19/24 07/19/24 Range/Units





  04:55 06:20 11:59 


 


WBC     (3.8-10.6)  k/uL


 


RBC     (4.30-5.90)  m/uL


 


Hgb     (13.0-17.5)  gm/dL


 


Hct     (39.0-53.0)  %


 


MCV     (80.0-100.0)  fL


 


Chloride  111 H    ()  mmol/L


 


BUN  28 H    (9-20)  mg/dL


 


Creatinine  0.52 L    (0.66-1.25)  mg/dL


 


Glucose  139 H    (74-99)  mg/dL


 


POC Glucose (mg/dL)   134 H  179 H  ()  mg/dL


 


Calcium  7.9 L    (8.4-10.2)  mg/dL


 


Magnesium  1.5 L    (1.6-2.3)  mg/dL








                      Microbiology - Last 24 Hours (Table)











 07/17/24 17:20 Blood Culture - Preliminary





 Blood 














Assessment and Plan


(1) Allergy to multiple antibiotics


Current Visit: Yes   Status: Acute   Code(s): Z88.1 - ALLERGY STATUS TO OTHER 

ANTIBIOTIC AGENTS   SNOMED Code(s): 042526392


   





(2) Leukocytosis


Current Visit: Yes   Status: Acute   Code(s): D72.829 - ELEVATED WHITE BLOOD 

CELL COUNT, UNSPECIFIED   SNOMED Code(s): 936935247


   





(3) Leg wound, right


Current Visit: Yes   Status: Acute   Code(s): S81.801A - UNSPECIFIED OPEN WOUND,

RIGHT LOWER LEG, INITIAL ENCOUNTER   SNOMED Code(s): 22877021955513971


   


Plan: 





1patient presented to hospital with mental status changes possibly fentanyl 

overdose patches has been removed patient also have chronic nonhealing wound to 

the right leg which has been there for many years with exposed hardware and 

likely concerning for osteomyelitis patient wound has increased in size compared

to 2-year ago when I saw the patient last with the hardware exposed and highly 

suspicious for possible osteomyelitis .


2local wound culture currently growing MRSA and bacteroids patient did have 

elevated sed rate of 85


3patient with multiple antibiotic ALLERGIES that would limit the number of 

antibiotic safe to use.


4patient benefit from removal of the infected hardware in the wound bed in 

order to completely heal this infection x-rays were reviewed with the family as 

well as with the radiologist and more likely the upper screw that is visible 

will discuss with orthopedics see if they can remove that screw and help heal 

this infection if not patient may need to be referred to orthopedics at tertiary

care


5patient did  have a improvement in the fever pattern,  will continue with the 

daptomycin and Flagyl and monitor clinical course closely








Dictation was produced using dragon dictation software. please excuse any 

grammatical, word or spelling errors. 








Time with Patient: Less than 30

## 2024-07-20 LAB
GLUCOSE BLD-MCNC: 125 MG/DL (ref 70–110)
GLUCOSE BLD-MCNC: 140 MG/DL (ref 70–110)
GLUCOSE BLD-MCNC: 153 MG/DL (ref 70–110)
GLUCOSE BLD-MCNC: 159 MG/DL (ref 70–110)
GLUCOSE BLD-MCNC: 163 MG/DL (ref 70–110)
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
## 2024-07-21 LAB
ANION GAP SERPL CALC-SCNC: 15.7 MMOL/L (ref 4–12)
BASOPHILS # BLD AUTO: 0.07 X 10*3/UL (ref 0–0.1)
BASOPHILS NFR BLD AUTO: 0.5 %
BUN SERPL-SCNC: 21.3 MG/DL (ref 9–27)
BUN/CREAT SERPL: 35.5 RATIO (ref 12–20)
CALCIUM SPEC-MCNC: 8 MG/DL (ref 8.7–10.3)
CHLORIDE SERPL-SCNC: 105 MMOL/L (ref 96–109)
CO2 SERPL-SCNC: 27.3 MMOL/L (ref 21.6–31.8)
EOSINOPHIL # BLD AUTO: 0.11 X 10*3/UL (ref 0.04–0.35)
EOSINOPHIL NFR BLD AUTO: 0.8 %
ERYTHROCYTE [DISTWIDTH] IN BLOOD BY AUTOMATED COUNT: 3.23 X 10*6/UL (ref 4.4–5.6)
ERYTHROCYTE [DISTWIDTH] IN BLOOD: 15.5 % (ref 11.5–14.5)
GLUCOSE BLD-MCNC: 131 MG/DL (ref 70–110)
GLUCOSE BLD-MCNC: 134 MG/DL (ref 70–110)
GLUCOSE BLD-MCNC: 139 MG/DL (ref 70–110)
GLUCOSE BLD-MCNC: 148 MG/DL (ref 70–110)
GLUCOSE SERPL-MCNC: 139 MG/DL (ref 70–110)
HCT VFR BLD AUTO: 32.5 % (ref 39.6–50)
HGB BLD-MCNC: 10.5 G/DL (ref 13–17)
IMM GRANULOCYTES BLD QL AUTO: 0.9 %
LYMPHOCYTES # SPEC AUTO: 1.36 X 10*3/UL (ref 0.9–5)
LYMPHOCYTES NFR SPEC AUTO: 9.9 %
MAGNESIUM SPEC-SCNC: 1.3 MG/DL (ref 1.5–2.4)
MCH RBC QN AUTO: 32.5 PG (ref 27–32)
MCHC RBC AUTO-ENTMCNC: 32.3 G/DL (ref 32–37)
MCV RBC AUTO: 100.6 FL (ref 80–97)
MONOCYTES # BLD AUTO: 1.28 X 10*3/UL (ref 0.2–1)
MONOCYTES NFR BLD AUTO: 9.3 %
NEUTROPHILS # BLD AUTO: 10.78 X 10*3/UL (ref 1.8–7.7)
NEUTROPHILS NFR BLD AUTO: 78.6 %
NRBC BLD AUTO-RTO: 0 X 10*3/UL (ref 0–0.01)
PLATELET # BLD AUTO: 357 X 10*3/UL (ref 140–440)
POTASSIUM SERPL-SCNC: 3.5 MMOL/L (ref 3.5–5.5)
SODIUM SERPL-SCNC: 148 MMOL/L (ref 135–145)
WBC # BLD AUTO: 13.73 X 10*3/UL (ref 4.5–10)

## 2024-07-21 RX ADMIN — SODIUM BICARBONATE SCH MLS/HR: 84 INJECTION, SOLUTION INTRAVENOUS at 17:12

## 2024-07-21 NOTE — P.PN
Progress Note - Text


Progress Note Date: 07/21/24





Chart and PNotes reviewed.  Pt is currently looking better and more stable.  We 

will plan on possible DARIN on Tuesday this week pending clearances and stability 

of pt.
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
## 2024-07-21 NOTE — P.PN
Subjective


Progress Note Date: 07/21/24


Principal diagnosis: 





Reason for follow-up is right leg wound and osteomyelitis





The patient is a 73-year-old  male with a past medical history 

significant for diabetes mellitus hypertension hyperlipidemia coronary disease 

prostate disorder, patient did have a history of previous injury to the right 

leg requiring operative repair with hardware and has been dealing with a 

nonhealing wound to the right anterior leg for few years presented to hospital 

mental status changes possible overdose on fentanyl patch with a worsening wound

to the right leg prompting this consultation.


On today's evaluation that is 07/21/2024,the patient is slightly more awake and 

alert today and denies any fever or any chills, patient is breathing comfortably

on 2 L of oxygen, the patient denies  chest pain shortness of breath and no 

significant cough, patient denies abdominal pain, no nausea vomiting or 

diarrhea, but denies any worsening pain to the right leg wound.


Patient white count 13.73, creatinine 0.6 BAL culture remains to be negative








Objective





- Vital Signs


Vital signs: 


                                   Vital Signs











Temp  98.6 F   07/21/24 07:16


 


Pulse  95   07/21/24 07:53


 


Resp  18   07/21/24 07:16


 


BP  163/75   07/21/24 07:16


 


Pulse Ox  96   07/21/24 07:16


 


FiO2  30   07/18/24 11:57








                                 Intake & Output











 07/20/24 07/21/24 07/21/24





 18:59 06:59 18:59


 


Output Total 3082 1825 


 


Balance -2675 -1825 


 


Weight  90 kg 


 


Output:   


 


  Gastric Drainage  375 


 


  Urine 2675 1450 


 


Other:   


 


  Voiding Method Indwelling Catheter Indwelling Catheter 








                       ABP, PAP, CO, CI - Last Documented











Arterial Blood Pressure        118/42

















- Exam





GENERAL DESCRIPTION: An elderly male lying in bed in no distress





RESPIRATORY SYSTEM: Unlabored breathing , decreased breath sounds at bases





HEART: S1 S2 regular rate and rhythm ,





ABDOMEN: Soft , no tenderness





EXTREMITIES: Right leg wound is currently dressed no drainage on the dressing





- Labs


CBC & Chem 7: 


                                 07/21/24 04:13





                                 07/21/24 04:13


Labs: 


                  Abnormal Lab Results - Last 24 Hours (Table)











  07/20/24 07/20/24 07/20/24 Range/Units





  11:32 16:33 23:08 


 


WBC     (4.50-10.00)  X 10*3/uL


 


RBC     (4.40-5.60)  X 10*6/uL


 


Hgb     (13.0-17.0)  g/dL


 


Hct     (39.6-50.0)  %


 


MCV     (80.0-97.0)  FL


 


MCH     (27.0-32.0)  pg


 


RDW     (11.5-14.5)  %


 


Immature Gran #     (0.00-0.04)  X 10*3/uL


 


Neutrophils #     (1.80-7.70)  X 10*3/uL


 


Monocytes #     (0.20-1.00)  X 10*3/uL


 


Sodium     (135-145)  mmol/L


 


Anion Gap     (4.00-12.00)  mmol/L


 


BUN/Creatinine Ratio     (12.00-20.00)  Ratio


 


Glucose     ()  mg/dL


 


POC Glucose (mg/dL)  153 H  140 H  163 H  ()  mg/dL


 


Calcium     (8.7-10.3)  mg/dL


 


Magnesium     (1.5-2.4)  mg/dL














  07/21/24 07/21/24 07/21/24 Range/Units





  04:13 04:13 06:24 


 


WBC   13.73 H   (4.50-10.00)  X 10*3/uL


 


RBC   3.23 L   (4.40-5.60)  X 10*6/uL


 


Hgb   10.5 L   (13.0-17.0)  g/dL


 


Hct   32.5 L   (39.6-50.0)  %


 


MCV   100.6 H   (80.0-97.0)  FL


 


MCH   32.5 H   (27.0-32.0)  pg


 


RDW   15.5 H   (11.5-14.5)  %


 


Immature Gran #   0.13 H   (0.00-0.04)  X 10*3/uL


 


Neutrophils #   10.78 H   (1.80-7.70)  X 10*3/uL


 


Monocytes #   1.28 H   (0.20-1.00)  X 10*3/uL


 


Sodium  148 H    (135-145)  mmol/L


 


Anion Gap  15.70 H    (4.00-12.00)  mmol/L


 


BUN/Creatinine Ratio  35.50 H    (12.00-20.00)  Ratio


 


Glucose  139 H    ()  mg/dL


 


POC Glucose (mg/dL)    134 H  ()  mg/dL


 


Calcium  8.0 L    (8.7-10.3)  mg/dL


 


Magnesium  1.3 L    (1.5-2.4)  mg/dL








                      Microbiology - Last 24 Hours (Table)











 07/17/24 17:20 Blood Culture - Preliminary





 Blood 














Assessment and Plan


(1) Allergy to multiple antibiotics


Current Visit: Yes   Status: Acute   Code(s): Z88.1 - ALLERGY STATUS TO OTHER 

ANTIBIOTIC AGENTS   SNOMED Code(s): 091069745


   





(2) Leukocytosis


Current Visit: Yes   Status: Acute   Code(s): D72.829 - ELEVATED WHITE BLOOD 

CELL COUNT, UNSPECIFIED   SNOMED Code(s): 596779311


   





(3) Leg wound, right


Current Visit: Yes   Status: Acute   Code(s): S81.801A - UNSPECIFIED OPEN WOUND,

RIGHT LOWER LEG, INITIAL ENCOUNTER   SNOMED Code(s): 43158053834467279


   


Plan: 





1patient presented to hospital with mental status changes possibly fentanyl 

overdose patches has been removed patient also have chronic nonhealing wound to 

the right leg which has been there for many years with exposed hardware and 

likely concerning for osteomyelitis patient wound has increased in size compared

to 2-year ago when I saw the patient last with the hardware exposed and highly 

suspicious for possible osteomyelitis .


2local wound culture currently growing MRSA and bacteroids patient did have 

elevated sed rate of 85


3patient with multiple antibiotic ALLERGIES that would limit the number of 

antibiotic safe to use.


4patient benefit from removal of the infected hardware in the wound bed in 

order to completely heal this infection x-rays were reviewed with the family as 

well as with the radiologist and more likely the upper screw that is visible 

case has been discussed with orthopedics who have evaluated the patient and 

possible planning for removal of the hardware on Tuesday as per discussion with 

NP for orthopedics


5patient to continue with the daptomycin and Flagyl will need a PICC line for 

outpatient IV antibiotics





Dictation was produced using dragon dictation software. please excuse any 

grammatical, word or spelling errors. 








Time with Patient: Less than 30

## 2024-07-22 LAB
ANION GAP SERPL CALC-SCNC: 11 MMOL/L
BUN SERPL-SCNC: 22 MG/DL (ref 9–20)
CALCIUM SPEC-MCNC: 8 MG/DL (ref 8.4–10.2)
CHLORIDE SERPL-SCNC: 106 MMOL/L (ref 98–107)
CO2 SERPL-SCNC: 25 MMOL/L (ref 22–30)
GLUCOSE BLD-MCNC: 130 MG/DL (ref 70–110)
GLUCOSE BLD-MCNC: 133 MG/DL (ref 70–110)
GLUCOSE BLD-MCNC: 134 MG/DL (ref 70–110)
GLUCOSE BLD-MCNC: 179 MG/DL (ref 70–110)
GLUCOSE SERPL-MCNC: 131 MG/DL (ref 74–99)
POTASSIUM SERPL-SCNC: 3.5 MMOL/L (ref 3.5–5.1)
SODIUM SERPL-SCNC: 142 MMOL/L (ref 137–145)

## 2024-07-22 RX ADMIN — POTASSIUM CHLORIDE SCH: 750 TABLET, EXTENDED RELEASE ORAL at 23:22

## 2024-07-22 RX ADMIN — POTASSIUM CHLORIDE STA MEQ: 20 TABLET, EXTENDED RELEASE ORAL at 23:21
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
## 2024-07-23 LAB
ANION GAP SERPL CALC-SCNC: 17.8 MMOL/L (ref 4–12)
BASOPHILS # BLD MANUAL: 0.24 X 10*3/UL (ref 0–0.1)
BUN SERPL-SCNC: 16.3 MG/DL (ref 9–27)
BUN/CREAT SERPL: 23.29 RATIO (ref 12–20)
CALCIUM SPEC-MCNC: 7.7 MG/DL (ref 8.7–10.3)
CHLORIDE SERPL-SCNC: 97 MMOL/L (ref 96–109)
CO2 SERPL-SCNC: 27.2 MMOL/L (ref 21.6–31.8)
EOSINOPHIL # BLD MANUAL: 0 X 10*3/UL (ref 0.04–0.35)
ERYTHROCYTE [DISTWIDTH] IN BLOOD BY AUTOMATED COUNT: 2.93 M/UL (ref 4.3–5.9)
ERYTHROCYTE [DISTWIDTH] IN BLOOD BY AUTOMATED COUNT: 3.45 X 10*6/UL (ref 4.4–5.6)
ERYTHROCYTE [DISTWIDTH] IN BLOOD: 14.7 % (ref 11.5–15.5)
ERYTHROCYTE [DISTWIDTH] IN BLOOD: 15.2 % (ref 11.5–14.5)
GLUCOSE BLD-MCNC: 137 MG/DL (ref 70–110)
GLUCOSE BLD-MCNC: 137 MG/DL (ref 70–110)
GLUCOSE BLD-MCNC: 154 MG/DL (ref 70–110)
GLUCOSE BLD-MCNC: 237 MG/DL (ref 70–110)
GLUCOSE SERPL-MCNC: 130 MG/DL (ref 70–110)
HCT VFR BLD AUTO: 30.2 % (ref 39–53)
HCT VFR BLD AUTO: 34.2 % (ref 39.6–50)
HGB BLD-MCNC: 11.2 G/DL (ref 13–17)
HGB BLD-MCNC: 9.8 GM/DL (ref 13–17.5)
LYMPHOCYTES # BLD MANUAL: 0.71 X 10*3/UL (ref 0.9–5)
MACROCYTES BLD QL SMEAR: (no result)
MAGNESIUM SPEC-SCNC: 0.9 MG/DL (ref 1.5–2.4)
MCH RBC QN AUTO: 32.5 PG (ref 27–32)
MCH RBC QN AUTO: 33.5 PG (ref 25–35)
MCHC RBC AUTO-ENTMCNC: 32.5 G/DL (ref 31–37)
MCHC RBC AUTO-ENTMCNC: 32.7 G/DL (ref 32–37)
MCV RBC AUTO: 103.1 FL (ref 80–100)
MCV RBC AUTO: 99.1 FL (ref 80–97)
MONOCYTES # BLD MANUAL: 0.95 X 10*3/UL (ref 0.2–1)
NEUTROPHILS NFR BLD MANUAL: 92 %
NEUTS SEG # BLD MANUAL: 21.81 X 10*3/UL (ref 1.8–7.7)
NRBC BLD AUTO-RTO: 0 X 10*3/UL (ref 0–0.01)
PLATELET # BLD AUTO: 371 K/UL (ref 150–450)
PLATELET # BLD AUTO: 383 X 10*3/UL (ref 140–440)
POTASSIUM SERPL-SCNC: 3.6 MMOL/L (ref 3.5–5.5)
SODIUM SERPL-SCNC: 142 MMOL/L (ref 135–145)
WBC # BLD AUTO: 23.71 X 10*3/UL (ref 4.5–10)
WBC # BLD AUTO: 26.4 K/UL (ref 3.8–10.6)

## 2024-07-23 RX ADMIN — SODIUM CHLORIDE STA MLS/HR: 9 INJECTION, SOLUTION INTRAVENOUS at 18:13

## 2024-07-23 RX ADMIN — MAGNESIUM SULFATE IN DEXTROSE SCH MLS/HR: 10 INJECTION, SOLUTION INTRAVENOUS at 09:56

## 2024-07-23 RX ADMIN — CEFEPIME HYDROCHLORIDE SCH MLS/HR: 2 INJECTION, POWDER, FOR SOLUTION INTRAVENOUS at 18:12

## 2024-07-23 NOTE — P.PN
Progress Note - Text


Progress Note Date: 07/23/24





Orthopedic surgical procedure to the right lower extremity was canceled today 

due to not being medically cleared.  At that time patient was refusing further 

medical care.  No orthopedic surgical intervention at this time.  Please contact

our service with any further questions regarding this patient.
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
## 2024-07-24 LAB
ALBUMIN SERPL-MCNC: 2.7 G/DL (ref 3.8–4.9)
ALBUMIN/GLOB SERPL: 0.96 RATIO (ref 1.6–3.17)
ALP SERPL-CCNC: 67 U/L (ref 41–126)
ALT SERPL-CCNC: 23 U/L (ref 10–49)
ANION GAP SERPL CALC-SCNC: 15.7 MMOL/L (ref 4–12)
AST SERPL-CCNC: 27 U/L (ref 14–35)
BASOPHILS # BLD AUTO: 0.07 X 10*3/UL (ref 0–0.1)
BASOPHILS NFR BLD AUTO: 0.3 %
BUN SERPL-SCNC: 24.3 MG/DL (ref 9–27)
BUN/CREAT SERPL: 27 RATIO (ref 12–20)
CALCIUM SPEC-MCNC: 7 MG/DL (ref 8.7–10.3)
CHLORIDE SERPL-SCNC: 95 MMOL/L (ref 96–109)
CO2 SERPL-SCNC: 25.3 MMOL/L (ref 21.6–31.8)
EOSINOPHIL # BLD AUTO: 0.1 X 10*3/UL (ref 0.04–0.35)
EOSINOPHIL NFR BLD AUTO: 0.4 %
ERYTHROCYTE [DISTWIDTH] IN BLOOD BY AUTOMATED COUNT: 2.75 X 10*6/UL (ref 4.4–5.6)
ERYTHROCYTE [DISTWIDTH] IN BLOOD: 15.2 % (ref 11.5–14.5)
GLOBULIN SER CALC-MCNC: 2.8 G/DL (ref 1.6–3.3)
GLUCOSE BLD-MCNC: 191 MG/DL (ref 70–110)
GLUCOSE BLD-MCNC: 218 MG/DL (ref 70–110)
GLUCOSE BLD-MCNC: 239 MG/DL (ref 70–110)
GLUCOSE BLD-MCNC: 263 MG/DL (ref 70–110)
GLUCOSE SERPL-MCNC: 167 MG/DL (ref 70–110)
HCT VFR BLD AUTO: 27.4 % (ref 39.6–50)
HGB BLD-MCNC: 8.9 G/DL (ref 13–17)
IMM GRANULOCYTES BLD QL AUTO: 1.4 %
LYMPHOCYTES # SPEC AUTO: 1.86 X 10*3/UL (ref 0.9–5)
LYMPHOCYTES NFR SPEC AUTO: 6.9 %
MAGNESIUM SPEC-SCNC: 1.5 MG/DL (ref 1.5–2.4)
MCH RBC QN AUTO: 32.4 PG (ref 27–32)
MCHC RBC AUTO-ENTMCNC: 32.5 G/DL (ref 32–37)
MCV RBC AUTO: 99.6 FL (ref 80–97)
MONOCYTES # BLD AUTO: 1.25 X 10*3/UL (ref 0.2–1)
MONOCYTES NFR BLD AUTO: 4.6 %
NEUTROPHILS # BLD AUTO: 23.38 X 10*3/UL (ref 1.8–7.7)
NEUTROPHILS NFR BLD AUTO: 86.4 %
NRBC BLD AUTO-RTO: 0 X 10*3/UL (ref 0–0.01)
PLATELET # BLD AUTO: 346 X 10*3/UL (ref 140–440)
POTASSIUM SERPL-SCNC: 3.3 MMOL/L (ref 3.5–5.5)
PROT SERPL-MCNC: 5.5 G/DL (ref 6.2–8.2)
SODIUM SERPL-SCNC: 136 MMOL/L (ref 135–145)
WBC # BLD AUTO: 27.03 X 10*3/UL (ref 4.5–10)

## 2024-07-24 RX ADMIN — SODIUM CHLORIDE SCH MLS/HR: 9 INJECTION, SOLUTION INTRAVENOUS at 06:05

## 2024-07-24 RX ADMIN — SODIUM CHLORIDE SCH MLS/HR: 9 INJECTION, SOLUTION INTRAVENOUS at 17:52

## 2024-07-24 RX ADMIN — MIRTAZAPINE SCH MG: 15 TABLET, FILM COATED ORAL at 20:26

## 2024-07-24 NOTE — XR
EXAMINATION TYPE: XR hand complete LT

 

DATE OF EXAM: 7/24/2024

 

COMPARISON: NONE

 

HISTORY: Pain

 

TECHNIQUE: Three views are submitted.

 

FINDINGS:

The osseous structures are intact. No acute fracture or dislocation. Chronic deformity of the fifth m
etacarpal suggests remote fracture.

 

Diffuse demineralization moderate MCP joint arthropathy. Severe arthropathy of the trapezial scaphoid
 joint and moderate arthropathy first carpometacarpal joint. 

 

There is a moderate arthropathy at all DIP joints. No erosive changes. Vascular calcifications noted.
 Assessment of the distal phalanges limited due to flexion positioning.

 

 

IMPRESSION:

1. Diffuse arthropathy most likely on the basis of osteoarthritis.
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
## 2024-07-25 LAB
ALBUMIN SERPL-MCNC: 2.4 G/DL (ref 3.5–5)
ALBUMIN/GLOB SERPL: 0.8 {RATIO}
ALP SERPL-CCNC: 53 U/L (ref 38–126)
ALT SERPL-CCNC: 20 U/L (ref 4–49)
ANION GAP SERPL CALC-SCNC: 4 MMOL/L
ANION GAP SERPL CALC-SCNC: 6 MMOL/L
APTT BLD: 31.5 SEC (ref 22–30)
AST SERPL-CCNC: 35 U/L (ref 17–59)
BASOPHILS # BLD AUTO: 0 K/UL (ref 0–0.2)
BASOPHILS # BLD AUTO: 0.1 K/UL (ref 0–0.2)
BASOPHILS NFR BLD AUTO: 0 %
BASOPHILS NFR BLD AUTO: 0 %
BUN SERPL-SCNC: 23 MG/DL (ref 9–20)
BUN SERPL-SCNC: 25 MG/DL (ref 9–20)
CALCIUM SPEC-MCNC: 6.8 MG/DL (ref 8.4–10.2)
CALCIUM SPEC-MCNC: 6.9 MG/DL (ref 8.4–10.2)
CHLORIDE SERPL-SCNC: 101 MMOL/L (ref 98–107)
CHLORIDE SERPL-SCNC: 102 MMOL/L (ref 98–107)
CO2 BLDA-SCNC: 33 MMOL/L (ref 19–24)
CO2 SERPL-SCNC: 28 MMOL/L (ref 22–30)
CO2 SERPL-SCNC: 29 MMOL/L (ref 22–30)
EOSINOPHIL # BLD AUTO: 0.2 K/UL (ref 0–0.7)
EOSINOPHIL # BLD AUTO: 0.2 K/UL (ref 0–0.7)
EOSINOPHIL NFR BLD AUTO: 2 %
EOSINOPHIL NFR BLD AUTO: 2 %
ERYTHROCYTE [DISTWIDTH] IN BLOOD BY AUTOMATED COUNT: 2.63 M/UL (ref 4.3–5.9)
ERYTHROCYTE [DISTWIDTH] IN BLOOD BY AUTOMATED COUNT: 2.72 M/UL (ref 4.3–5.9)
ERYTHROCYTE [DISTWIDTH] IN BLOOD: 14.6 % (ref 11.5–15.5)
ERYTHROCYTE [DISTWIDTH] IN BLOOD: 15.2 % (ref 11.5–15.5)
GLOBULIN SER CALC-MCNC: 3 G/DL
GLUCOSE BLD-MCNC: 154 MG/DL (ref 70–110)
GLUCOSE BLD-MCNC: 181 MG/DL (ref 70–110)
GLUCOSE BLD-MCNC: 187 MG/DL (ref 70–110)
GLUCOSE BLD-MCNC: 187 MG/DL (ref 70–110)
GLUCOSE BLD-MCNC: 237 MG/DL (ref 70–110)
GLUCOSE SERPL-MCNC: 192 MG/DL (ref 74–99)
GLUCOSE SERPL-MCNC: 198 MG/DL (ref 74–99)
HCO3 BLDA-SCNC: 31 MMOL/L (ref 21–25)
HCT VFR BLD AUTO: 27.2 % (ref 39–53)
HCT VFR BLD AUTO: 27.9 % (ref 39–53)
HGB BLD-MCNC: 8.8 GM/DL (ref 13–17.5)
HGB BLD-MCNC: 9 GM/DL (ref 13–17.5)
INR PPP: 1.2 (ref ?–1.2)
LYMPHOCYTES # SPEC AUTO: 0.8 K/UL (ref 1–4.8)
LYMPHOCYTES # SPEC AUTO: 0.8 K/UL (ref 1–4.8)
LYMPHOCYTES NFR SPEC AUTO: 7 %
LYMPHOCYTES NFR SPEC AUTO: 7 %
MAGNESIUM SPEC-SCNC: 1.3 MG/DL (ref 1.6–2.3)
MCH RBC QN AUTO: 33.1 PG (ref 25–35)
MCH RBC QN AUTO: 33.3 PG (ref 25–35)
MCHC RBC AUTO-ENTMCNC: 32.3 G/DL (ref 31–37)
MCHC RBC AUTO-ENTMCNC: 32.3 G/DL (ref 31–37)
MCV RBC AUTO: 102.5 FL (ref 80–100)
MCV RBC AUTO: 103.3 FL (ref 80–100)
MONOCYTES # BLD AUTO: 0.6 K/UL (ref 0–1)
MONOCYTES # BLD AUTO: 0.7 K/UL (ref 0–1)
MONOCYTES NFR BLD AUTO: 5 %
MONOCYTES NFR BLD AUTO: 6 %
NEUTROPHILS # BLD AUTO: 9.3 K/UL (ref 1.3–7.7)
NEUTROPHILS # BLD AUTO: 9.8 K/UL (ref 1.3–7.7)
NEUTROPHILS NFR BLD AUTO: 83 %
NEUTROPHILS NFR BLD AUTO: 84 %
PCO2 BLDA: 45 MMHG (ref 35–45)
PH BLDA: 7.45 [PH] (ref 7.35–7.45)
PLATELET # BLD AUTO: 353 K/UL (ref 150–450)
PLATELET # BLD AUTO: 377 K/UL (ref 150–450)
PO2 BLDA: 74 MMHG (ref 83–108)
POTASSIUM SERPL-SCNC: 3.1 MMOL/L (ref 3.5–5.1)
POTASSIUM SERPL-SCNC: 3.2 MMOL/L (ref 3.5–5.1)
PROT SERPL-MCNC: 5.4 G/DL (ref 6.3–8.2)
PT BLD: 12.4 SEC (ref 10–12.5)
SODIUM SERPL-SCNC: 135 MMOL/L (ref 137–145)
SODIUM SERPL-SCNC: 135 MMOL/L (ref 137–145)
WBC # BLD AUTO: 11.3 K/UL (ref 3.8–10.6)
WBC # BLD AUTO: 11.6 K/UL (ref 3.8–10.6)

## 2024-07-25 RX ADMIN — CYANOCOBALAMIN SCH MCG: 1000 INJECTION, SOLUTION INTRAMUSCULAR; SUBCUTANEOUS at 21:09

## 2024-07-25 RX ADMIN — POTASSIUM CHLORIDE SCH MEQ: 20 TABLET, EXTENDED RELEASE ORAL at 22:42

## 2024-07-25 RX ADMIN — ASPIRIN 325 MG ORAL TABLET SCH MG: 325 PILL ORAL at 14:41

## 2024-07-25 RX ADMIN — METOPROLOL TARTRATE SCH MG: 25 TABLET, FILM COATED ORAL at 15:57

## 2024-07-25 RX ADMIN — ASPIRIN SCH: 300 SUPPOSITORY RECTAL at 14:41

## 2024-07-25 NOTE — P.PN
Subjective


Progress Note Date: 07/23/24


Principal diagnosis: 





Reason for follow-up is right leg wound and osteomyelitis





The patient is a 73-year-old  male with a past medical history 

significant for diabetes mellitus hypertension hyperlipidemia coronary disease 

prostate disorder, patient did have a history of previous injury to the right 

leg requiring operative repair with hardware and has been dealing with a 

nonhealing wound to the right anterior leg for few years presented to hospital 

mental status changes possible overdose on fentanyl patch with a worsening wound

to the right leg prompting this consultation.


On today's evaluation that is 07/23/2024, the patient continues to be afebrile, 

the patient is on 6 L nasal oxygen and breathing comfortably, the Pt denies 

having any chest pain or cough, the patient denies having any abdominal pain no 

vomiting or any diarrhea denies any worsening pain to the leg wound.


Patient white count is up to 23.71, creatinine 0.7 chest x-ray diffuse bilateral

infiltrate and effusion correlate for pulm edema versus ARDS





Objective





- Vital Signs


Vital signs: 


                                   Vital Signs











Temp  98.6 F   07/23/24 07:25


 


Pulse  131 H  07/23/24 07:25


 


Resp  33 H  07/23/24 07:25


 


BP  118/67   07/23/24 07:25


 


Pulse Ox  94 L  07/23/24 07:25


 


FiO2  30   07/18/24 11:57








                                 Intake & Output











 07/22/24 07/23/24 07/23/24





 18:59 06:59 18:59


 


Intake Total 200  


 


Output Total 1200 1400 


 


Balance -1000 -1400 


 


Weight 86.5 kg 84.5 kg 


 


Intake:   


 


  Oral 200  


 


Output:   


 


  Urine 1200 1400 


 


Other:   


 


  Voiding Method Indwelling Catheter Indwelling Catheter 


 


  # Voids 3  


 


  # Bowel Movements  1 








                       ABP, PAP, CO, CI - Last Documented











Arterial Blood Pressure        118/42

















- Exam





GENERAL DESCRIPTION: An elderly male lying in bed in no distress





RESPIRATORY SYSTEM: Unlabored breathing , decreased breath sounds at bases





HEART: S1 S2 regular rate and rhythm ,





ABDOMEN: Soft , no tenderness





EXTREMITIES: Right leg wound is currently dressed no drainage on the dressing





- Labs


CBC & Chem 7: 


                                 07/25/24 11:28





                                 07/25/24 11:28


Labs: 


                  Abnormal Lab Results - Last 24 Hours (Table)











  07/22/24 07/23/24 07/23/24 Range/Units





  17:15 00:13 05:32 


 


WBC    23.71 H  (4.50-10.00)  X 10*3/uL


 


RBC    3.45 L  (4.40-5.60)  X 10*6/uL


 


Hgb    11.2 L  (13.0-17.0)  g/dL


 


Hct    34.2 L  (39.6-50.0)  %


 


MCV    99.1 H  (80.0-97.0)  FL


 


MCH    32.5 H  (27.0-32.0)  pg


 


RDW    15.2 H  (11.5-14.5)  %


 


Neutrophils # (Manual)    21.81 H  (1.80-7.70)  X 10*3/uL


 


Lymphocytes # (Manual)    0.71 L  (0.90-5.00)  X 10*3/uL


 


Eosinophils # (Manual)    0 L  (0.04-0.35)  X 10*3/uL


 


Basophils # (Manual)    0.24 H  (0.00-0.10)  X 10*3/uL


 


Macrocytosis (manual)    2+ A  


 


Anion Gap     (4.00-12.00)  mmol/L


 


BUN/Creatinine Ratio     (12.00-20.00)  Ratio


 


Glucose     ()  mg/dL


 


POC Glucose (mg/dL)  179 H  137 H   ()  mg/dL


 


Calcium     (8.7-10.3)  mg/dL


 


Magnesium     (1.5-2.4)  mg/dL














  07/23/24 07/23/24 07/23/24 Range/Units





  05:32 06:07 11:23 


 


WBC     (4.50-10.00)  X 10*3/uL


 


RBC     (4.40-5.60)  X 10*6/uL


 


Hgb     (13.0-17.0)  g/dL


 


Hct     (39.6-50.0)  %


 


MCV     (80.0-97.0)  FL


 


MCH     (27.0-32.0)  pg


 


RDW     (11.5-14.5)  %


 


Neutrophils # (Manual)     (1.80-7.70)  X 10*3/uL


 


Lymphocytes # (Manual)     (0.90-5.00)  X 10*3/uL


 


Eosinophils # (Manual)     (0.04-0.35)  X 10*3/uL


 


Basophils # (Manual)     (0.00-0.10)  X 10*3/uL


 


Macrocytosis (manual)     


 


Anion Gap  17.80 H    (4.00-12.00)  mmol/L


 


BUN/Creatinine Ratio  23.29 H    (12.00-20.00)  Ratio


 


Glucose  130 H    ()  mg/dL


 


POC Glucose (mg/dL)   137 H  154 H  ()  mg/dL


 


Calcium  7.7 L    (8.7-10.3)  mg/dL


 


Magnesium  0.9 A*    (1.5-2.4)  mg/dL








                      Microbiology - Last 24 Hours (Table)











 07/17/24 17:20 Blood Culture - Final





 Blood 


 


 07/12/24 08:30 Acid Fast Bacilli Smear - Preliminary





 Bronchoalviolar Lavage - Right Acid Fast Bacilli Culture - Preliminary














Assessment and Plan


(1) Allergy to multiple antibiotics


Current Visit: Yes   Status: Acute   Code(s): Z88.1 - ALLERGY STATUS TO OTHER 

ANTIBIOTIC AGENTS   SNOMED Code(s): 878344648


   





(2) Leukocytosis


Current Visit: Yes   Status: Acute   Code(s): D72.829 - ELEVATED WHITE BLOOD 

CELL COUNT, UNSPECIFIED   SNOMED Code(s): 062796644


   





(3) Leg wound, right


Current Visit: Yes   Status: Acute   Code(s): S81.801A - UNSPECIFIED OPEN WOUND,

RIGHT LOWER LEG, INITIAL ENCOUNTER   SNOMED Code(s): 28815886592390015


   


Plan: 





1patient presented to hospital with mental status changes possibly fentanyl 

overdose patches has been removed patient also have chronic nonhealing wound to 

the right leg which has been there for many years with exposed hardware and 

likely concerning for osteomyelitis patient wound has increased in size compared

to 2-year ago when I saw the patient last with the hardware exposed and highly 

suspicious for possible osteomyelitis .


2local wound culture currently growing MRSA and bacteroids patient did have 

elevated sed rate of 85


3patient with multiple antibiotic ALLERGIES that would limit the number of 

antibiotic safe to use.


4patient benefit from removal of the infected hardware in the wound bed in 

order to completely heal this infection x-rays were reviewed with the family as 

well as with the radiologist and more likely the upper screw that is visible 

case has been discussed with orthopedics patient was evaluated and currently 

waiting for tomorrow of the hardware


5patient noticed to have some worsening of his white count as well as 

respiratory status with a question of possible aspiration pneumonitis we will 

discontinue daptomycin start the patient on vancomycin and cefepime continue 

with the Flagyl


Dictation was produced using dragon dictation software. please excuse any 

grammatical, word or spelling errors. 








Time with Patient: Less than 30
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
## 2024-07-25 NOTE — CT
EXAMINATION TYPE: CODE STROKE: CTA head neck

 

DATE OF EXAM: 7/25/2024

 

HISTORY: unresponsive

 

COMPARISON: 10/22/2018

 

CT DLP: 488.3 mGycm.  Automated Exposure Control for Dose Reduction was Utilized.

 

TECHNIQUE:  CTA scan of the head and neck is performed with IV Contrast, patient injected with 65 mL 
of Isovue 370, axial images are obtained, coronal and sagittal reformatted images are reviewed. 3D re
constructed images are created on an independent workstation and reviewed.

 

FINDINGS:

 

There is significant plaque formation at the origin of the left common carotid artery and the possibi
lity of significant stenosis is not excluded. There is a severe stenosis in the mid left common carot
id artery. There is a heavily calcified plaque in the left carotid bifurcation resulting in severe gr
eater than 75% stenosis of the distal left common and proximal left internal carotid arteries.

 

There is noncalcified mild to moderate proximal right internal carotid artery stenosis. There is a he
avily calcified plaque at the origin of the right vertebral artery consistent with at least a moderat
e stenosis. The right jugular artery is mildly dominant compared to the left vertebral artery. Intrac
ranially, there is calcification of the carotid bifurcations but no significant stenosis. There is no
 significant stenosis, aneurysms, vascular malformation or segmental occlusion intracranially.

 

IMPRESSION:

1. Possible significant stenosis of the origin left common carotid artery.

2. Severe greater than 75's set hemodynamically significant stenosis in the mid left common carotid a
rtery and in the origin of the left internal carotid artery. 

3. Mild to moderate stenosis in the proximal right internal carotid artery.

4. No significant occlusive disease, aneurysm or vascular malformation intracranially.

 

 

NASCET criteria was used in interpretation of this exam?

## 2024-07-25 NOTE — US
EXAMINATION TYPE: US carotid duplex BILAT

 

DATE OF EXAM: 7/25/2024

 

COMPARISON: CTA: Today

 

CLINICAL INDICATION: Male, 73 years old with history of evaluate carotid stenosis, mental status jacobo
ges; Stroke

**Pt unconscious, left side difficult due to positioning

 

TECHNIQUE: Carotid duplex ultrasound examination. Indirect Doppler criteria was utilized.

 

FINDINGS:

 

EXAM MEASUREMENTS: 

 

RIGHT:  Peak Systolic Velocity (PSV) cm/sec

----- Right CCA:  83.8  

----- Right ICA:  183.3     

----- Right ECA:  155.8   

ICA/CCA ratio:  2.2    

 

RIGHT:  End Diastole cm/sec

----- Right CCA:  12.5   

----- Right ICA:  34.6      

----- Right ECA:  5.3     

 

LEFT:  Peak Systolic Velocity (PSV) cm/sec

----- Left CCA:  131.9  

----- Left ICA:  123.9   

----- Left ECA:  294.8  

ICA/CCA ratio:  0.9  

 

LEFT:  End Diastole cm/sec

----- Left CCA:  21.5  

----- Left ICA:  31.3   

----- Left ECA:  0.0 

 

VERTEBRALS (direction of flow):

Right Vertebral: Antegrade

Left Vertebral: Antegrade

 

Rhythm:  Normal

 

SONOGRAPHER NOTES: Severe plaque seen throughout bilateral CCA's and ICA's. Elevated velocities seen 
in right prox ICA and left ECA

 

 

 

IMPRESSION:  

Findings are suggestive of 50-69% stenosis involving the proximal right ICA with severe atherosclerot
ic plaque noted bilaterally.   Correlate with CTA of the neck as clinically warranted.

 

 

 

 

 

 

Criteria for Assigning % of Stenosis / Diameter reduction

(Estimation based on the indirect measurements of the internal carotid artery velocities (ICA PSV).

1.  Normal (no stenosis)=ICA PSV < 125 cm/s: ratio < 2.0: ICA EDV<40 cm/s.

2. Less than 50% stenosis=ICA PSV < 125 cm/s: ratio < 2.0: ICA EDV<40 cm/s.

3.  50 to 69% stenosis=ICA PSV of 125 to 230 cm/s: ration 2.0 ? 4.0: ICA EDV  cm/s.

4.  Greater than 70% stenosis to near occlusion= ICA PSV > 230 cm/s: ratio > 4.0: ICA EDV > 100 cm/s.
 

5.  Near occlusion= ICA PSV velocities may be low or undetectable: variable ratio and ICA EDV.

6.  Total occlusion=unable to detect flow.
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
## 2024-07-25 NOTE — P.PN
Progress Note - Text


Progress Note Date: 07/25/24





Patient was initially scheduled for a right lower extremity procedure with 

hardware removal with Dr. Powers for 7/23/2024.  At that time patient was 

not medically stable and was denying medical treatment options, surgery was 

canceled at that time.  Since that initial day, patient has been receiving care 

from multiple medical specialties and was cleared for surgical procedure.  Dr. Powers is unavailable at this time.  I did speak with internal medicine 

regarding this on 7/24/2024 and discussed consulting the current on-call 

orthopedic group for their recommendations.  Attempt was made to consult the on-

call orthopedic group, they deferred the consult.  Patient's current state medi

juliana is very complicated, he has multiple medical comorbidities and and is 

being followed by multiple medical specialties.  We are recommending transfer to

tertiary care facility for further management, patient will likely need vascular

surgery consult and plastic surgery consult for the chronic leg wound.

## 2024-07-25 NOTE — P.CNNES
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   


 


Carbon Dioxide   


 


Anion Gap   


 


BUN   


 


Creatinine   


 


BUN/Creatinine Ratio   


 


Glucose   


 


POC Glucose (mg/dL)  144 H  158 H 


 


Hemoglobin A1c   


 


Calcium   


 


Magnesium   


 


Troponin I   


 


C-Reactive Protein   


 


Total Protein   


 


Albumin   


 


Albumin/Globulin Ratio   


 


Procalcitonin   


 


Urine Protein   


 


Fluid Appearance   


 


Urine Opiates Screen   


 


Ur Oxycodone Screen   














  07/14/24 07/14/24 07/14/24





  05:07 05:25 05:27


 


WBC   


 


RBC   


 


Hgb   


 


Hct   


 


MCV   


 


MCH   


 


MCHC   


 


RDW   


 


Immature Gran #   


 


Neutrophils #   


 


Neutrophils # (Manual)   


 


Lymphocytes #   


 


Lymphocytes # (Manual)   


 


Monocytes #   


 


Eosinophils # (Manual)   


 


Basophils # (Manual)   


 


Macrocytosis   


 


Macrocytosis (manual)   


 


ESR   


 


INR   


 


APTT   


 


ABG pH   7.24 L 


 


ABG pCO2   


 


ABG pO2   155 H 


 


ABG HCO3   18 L 


 


ABG Total CO2   


 


ABG O2 Saturation   99.7 H 


 


Sodium   


 


Potassium   


 


Chloride  120 H  


 


Carbon Dioxide  18 L  


 


Anion Gap   


 


BUN  23 H  


 


Creatinine  0.61 L  


 


BUN/Creatinine Ratio   


 


Glucose  177 H  


 


POC Glucose (mg/dL)    173 H


 


Hemoglobin A1c   


 


Calcium  7.6 L  


 


Magnesium   


 


Troponin I   


 


C-Reactive Protein   


 


Total Protein   


 


Albumin   


 


Albumin/Globulin Ratio   


 


Procalcitonin   


 


Urine Protein   


 


Fluid Appearance   


 


Urine Opiates Screen   


 


Ur Oxycodone Screen   














  07/14/24 07/14/24 07/15/24





  12:01 18:16 00:45


 


WBC   


 


RBC   


 


Hgb   


 


Hct   


 


MCV   


 


MCH   


 


MCHC   


 


RDW   


 


Immature Gran #   


 


Neutrophils #   


 


Neutrophils # (Manual)   


 


Lymphocytes #   


 


Lymphocytes # (Manual)   


 


Monocytes #   


 


Eosinophils # (Manual)   


 


Basophils # (Manual)   


 


Macrocytosis   


 


Macrocytosis (manual)   


 


ESR   


 


INR   


 


APTT   


 


ABG pH   


 


ABG pCO2   


 


ABG pO2   


 


ABG HCO3   


 


ABG Total CO2   


 


ABG O2 Saturation   


 


Sodium   


 


Potassium   


 


Chloride   


 


Carbon Dioxide   


 


Anion Gap   


 


BUN   


 


Creatinine   


 


BUN/Creatinine Ratio   


 


Glucose   


 


POC Glucose (mg/dL)  195 H  162 H  183 H


 


Hemoglobin A1c   


 


Calcium   


 


Magnesium   


 


Troponin I   


 


C-Reactive Protein   


 


Total Protein   


 


Albumin   


 


Albumin/Globulin Ratio   


 


Procalcitonin   


 


Urine Protein   


 


Fluid Appearance   


 


Urine Opiates Screen   


 


Ur Oxycodone Screen   














  07/15/24 07/15/24 07/15/24





  04:00 04:00 06:04


 


WBC   


 


RBC  2.68 L  


 


Hgb  9.1 L  


 


Hct  29.2 L  


 


MCV  109.0 H  


 


MCH   


 


MCHC   


 


RDW   


 


Immature Gran #   


 


Neutrophils #   


 


Neutrophils # (Manual)   


 


Lymphocytes #   


 


Lymphocytes # (Manual)   


 


Monocytes #   


 


Eosinophils # (Manual)   


 


Basophils # (Manual)   


 


Macrocytosis  Marked A  


 


Macrocytosis (manual)   


 


ESR   


 


INR   


 


APTT   


 


ABG pH    7.28 L


 


ABG pCO2    47 H


 


ABG pO2    118 H


 


ABG HCO3   


 


ABG Total CO2   


 


ABG O2 Saturation    99.2 H


 


Sodium   


 


Potassium   


 


Chloride   119 H 


 


Carbon Dioxide   21 L 


 


Anion Gap   


 


BUN   23 H 


 


Creatinine   0.62 L 


 


BUN/Creatinine Ratio   


 


Glucose   200 H 


 


POC Glucose (mg/dL)   


 


Hemoglobin A1c   


 


Calcium   7.6 L 


 


Magnesium   


 


Troponin I   


 


C-Reactive Protein   


 


Total Protein   


 


Albumin   


 


Albumin/Globulin Ratio   


 


Procalcitonin   


 


Urine Protein   


 


Fluid Appearance   


 


Urine Opiates Screen   


 


Ur Oxycodone Screen   














  07/15/24 07/15/24 07/15/24





  06:20 12:18 17:46


 


WBC   


 


RBC   


 


Hgb   


 


Hct   


 


MCV   


 


MCH   


 


MCHC   


 


RDW   


 


Immature Gran #   


 


Neutrophils #   


 


Neutrophils # (Manual)   


 


Lymphocytes #   


 


Lymphocytes # (Manual)   


 


Monocytes #   


 


Eosinophils # (Manual)   


 


Basophils # (Manual)   


 


Macrocytosis   


 


Macrocytosis (manual)   


 


ESR   


 


INR   


 


APTT   


 


ABG pH   


 


ABG pCO2   


 


ABG pO2   


 


ABG HCO3   


 


ABG Total CO2   


 


ABG O2 Saturation   


 


Sodium   


 


Potassium   


 


Chloride   


 


Carbon Dioxide   


 


Anion Gap   


 


BUN   


 


Creatinine   


 


BUN/Creatinine Ratio   


 


Glucose   


 


POC Glucose (mg/dL)  175 H  198 H  189 H


 


Hemoglobin A1c   


 


Calcium   


 


Magnesium   


 


Troponin I   


 


C-Reactive Protein   


 


Total Protein   


 


Albumin   


 


Albumin/Globulin Ratio   


 


Procalcitonin   


 


Urine Protein   


 


Fluid Appearance   


 


Urine Opiates Screen   


 


Ur Oxycodone Screen   














  07/15/24 07/16/24 07/16/24





  23:44 04:30 04:30


 


WBC   


 


RBC    2.75 L


 


Hgb    8.9 L


 


Hct    29.5 L


 


MCV    107.1 H


 


MCH   


 


MCHC    30.3 L


 


RDW   


 


Immature Gran #   


 


Neutrophils #   


 


Neutrophils # (Manual)   


 


Lymphocytes #    0.9 L


 


Lymphocytes # (Manual)   


 


Monocytes #   


 


Eosinophils # (Manual)   


 


Basophils # (Manual)   


 


Macrocytosis    Marked A


 


Macrocytosis (manual)   


 


ESR   


 


INR   


 


APTT   


 


ABG pH   


 


ABG pCO2   


 


ABG pO2   


 


ABG HCO3   


 


ABG Total CO2   


 


ABG O2 Saturation   


 


Sodium   


 


Potassium   


 


Chloride   117 H 


 


Carbon Dioxide   21 L 


 


Anion Gap   


 


BUN   25 H 


 


Creatinine   0.54 L 


 


BUN/Creatinine Ratio   


 


Glucose   169 H 


 


POC Glucose (mg/dL)  181 H  


 


Hemoglobin A1c   


 


Calcium   7.5 L 


 


Magnesium   


 


Troponin I   


 


C-Reactive Protein   


 


Total Protein   


 


Albumin   


 


Albumin/Globulin Ratio   


 


Procalcitonin   


 


Urine Protein   


 


Fluid Appearance   


 


Urine Opiates Screen   


 


Ur Oxycodone Screen   














  07/16/24 07/16/24 07/16/24





  05:29 06:22 11:55


 


WBC   


 


RBC   


 


Hgb   


 


Hct   


 


MCV   


 


MCH   


 


MCHC   


 


RDW   


 


Immature Gran #   


 


Neutrophils #   


 


Neutrophils # (Manual)   


 


Lymphocytes #   


 


Lymphocytes # (Manual)   


 


Monocytes #   


 


Eosinophils # (Manual)   


 


Basophils # (Manual)   


 


Macrocytosis   


 


Macrocytosis (manual)   


 


ESR   


 


INR   


 


APTT   


 


ABG pH   7.32 L 


 


ABG pCO2   48 H 


 


ABG pO2   112 H 


 


ABG HCO3   


 


ABG Total CO2   26 H 


 


ABG O2 Saturation   99.0 H 


 


Sodium   


 


Potassium   


 


Chloride   


 


Carbon Dioxide   


 


Anion Gap   


 


BUN   


 


Creatinine   


 


BUN/Creatinine Ratio   


 


Glucose   


 


POC Glucose (mg/dL)  176 H   233 H


 


Hemoglobin A1c   


 


Calcium   


 


Magnesium   


 


Troponin I   


 


C-Reactive Protein   


 


Total Protein   


 


Albumin   


 


Albumin/Globulin Ratio   


 


Procalcitonin   


 


Urine Protein   


 


Fluid Appearance   


 


Urine Opiates Screen   


 


Ur Oxycodone Screen   














  07/16/24 07/16/24 07/17/24





  18:00 23:40 00:17


 


WBC   


 


RBC   


 


Hgb   


 


Hct   


 


MCV   


 


MCH   


 


MCHC   


 


RDW   


 


Immature Gran #   


 


Neutrophils #   


 


Neutrophils # (Manual)   


 


Lymphocytes #   


 


Lymphocytes # (Manual)   


 


Monocytes #   


 


Eosinophils # (Manual)   


 


Basophils # (Manual)   


 


Macrocytosis   


 


Macrocytosis (manual)   


 


ESR   


 


INR   


 


APTT   


 


ABG pH   


 


ABG pCO2   


 


ABG pO2   


 


ABG HCO3   


 


ABG Total CO2   


 


ABG O2 Saturation   


 


Sodium   


 


Potassium   


 


Chloride   


 


Carbon Dioxide   


 


Anion Gap   


 


BUN   


 


Creatinine   


 


BUN/Creatinine Ratio   


 


Glucose   


 


POC Glucose (mg/dL)  189 H  167 H  188 H


 


Hemoglobin A1c   


 


Calcium   


 


Magnesium   


 


Troponin I   


 


C-Reactive Protein   


 


Total Protein   


 


Albumin   


 


Albumin/Globulin Ratio   


 


Procalcitonin   


 


Urine Protein   


 


Fluid Appearance   


 


Urine Opiates Screen   


 


Ur Oxycodone Screen   














  07/17/24 07/17/24 07/17/24





  05:50 06:00 06:09


 


WBC   


 


RBC  2.76 L  


 


Hgb  9.1 L  


 


Hct  29.9 L  


 


MCV  108.5 H  


 


MCH   


 


MCHC  30.5 L  


 


RDW   


 


Immature Gran #   


 


Neutrophils #  7.8 H  


 


Neutrophils # (Manual)   


 


Lymphocytes #   


 


Lymphocytes # (Manual)   


 


Monocytes #   


 


Eosinophils # (Manual)   


 


Basophils # (Manual)   


 


Macrocytosis  Marked A  


 


Macrocytosis (manual)   


 


ESR   


 


INR   


 


APTT   


 


ABG pH   


 


ABG pCO2   


 


ABG pO2   


 


ABG HCO3    27 H


 


ABG Total CO2    28 H


 


ABG O2 Saturation    98.5 H


 


Sodium   


 


Potassium   


 


Chloride   114 H 


 


Carbon Dioxide   


 


Anion Gap   


 


BUN   26 H 


 


Creatinine   0.51 L 


 


BUN/Creatinine Ratio   


 


Glucose   157 H 


 


POC Glucose (mg/dL)   


 


Hemoglobin A1c   


 


Calcium   7.7 L 


 


Magnesium   


 


Troponin I   


 


C-Reactive Protein   


 


Total Protein   


 


Albumin   


 


Albumin/Globulin Ratio   


 


Procalcitonin   


 


Urine Protein   


 


Fluid Appearance   


 


Urine Opiates Screen   


 


Ur Oxycodone Screen   














  07/17/24 07/17/24 07/17/24





  06:23 11:52 17:21


 


WBC   


 


RBC   


 


Hgb   


 


Hct   


 


MCV   


 


MCH   


 


MCHC   


 


RDW   


 


Immature Gran #   


 


Neutrophils #   


 


Neutrophils # (Manual)   


 


Lymphocytes #   


 


Lymphocytes # (Manual)   


 


Monocytes #   


 


Eosinophils # (Manual)   


 


Basophils # (Manual)   


 


Macrocytosis   


 


Macrocytosis (manual)   


 


ESR   


 


INR   


 


APTT   


 


ABG pH   


 


ABG pCO2   


 


ABG pO2   


 


ABG HCO3   


 


ABG Total CO2   


 


ABG O2 Saturation   


 


Sodium   


 


Potassium   


 


Chloride   


 


Carbon Dioxide   


 


Anion Gap   


 


BUN   


 


Creatinine   


 


BUN/Creatinine Ratio   


 


Glucose   


 


POC Glucose (mg/dL)  166 H  198 H  216 H


 


Hemoglobin A1c   


 


Calcium   


 


Magnesium   


 


Troponin I   


 


C-Reactive Protein   


 


Total Protein   


 


Albumin   


 


Albumin/Globulin Ratio   


 


Procalcitonin   


 


Urine Protein   


 


Fluid Appearance   


 


Urine Opiates Screen   


 


Ur Oxycodone Screen   














  07/17/24 07/18/24 07/18/24





  23:21 04:47 04:47


 


WBC   14.5 H 


 


RBC   2.78 L 


 


Hgb   9.1 L 


 


Hct   29.6 L 


 


MCV   106.3 H 


 


MCH   


 


MCHC   30.9 L 


 


RDW   


 


Immature Gran #   


 


Neutrophils #   11.5 H 


 


Neutrophils # (Manual)   


 


Lymphocytes #   


 


Lymphocytes # (Manual)   


 


Monocytes #   1.3 H 


 


Eosinophils # (Manual)   


 


Basophils # (Manual)   


 


Macrocytosis   


 


Macrocytosis (manual)   


 


ESR   


 


INR   


 


APTT   


 


ABG pH   


 


ABG pCO2   


 


ABG pO2   


 


ABG HCO3   


 


ABG Total CO2   


 


ABG O2 Saturation   


 


Sodium   


 


Potassium   


 


Chloride    113 H


 


Carbon Dioxide   


 


Anion Gap   


 


BUN    31 H


 


Creatinine    0.56 L


 


BUN/Creatinine Ratio   


 


Glucose    213 H


 


POC Glucose (mg/dL)  194 H  


 


Hemoglobin A1c   


 


Calcium    7.6 L


 


Magnesium   


 


Troponin I   


 


C-Reactive Protein   


 


Total Protein   


 


Albumin   


 


Albumin/Globulin Ratio   


 


Procalcitonin   


 


Urine Protein   


 


Fluid Appearance   


 


Urine Opiates Screen   


 


Ur Oxycodone Screen   














  07/18/24 07/18/24 07/18/24





  05:30 05:48 11:23


 


WBC   


 


RBC   


 


Hgb   


 


Hct   


 


MCV   


 


MCH   


 


MCHC   


 


RDW   


 


Immature Gran #   


 


Neutrophils #   


 


Neutrophils # (Manual)   


 


Lymphocytes #   


 


Lymphocytes # (Manual)   


 


Monocytes #   


 


Eosinophils # (Manual)   


 


Basophils # (Manual)   


 


Macrocytosis   


 


Macrocytosis (manual)   


 


ESR   


 


INR   


 


APTT   


 


ABG pH   


 


ABG pCO2    49 H


 


ABG pO2   


 


ABG HCO3  27 H   28 H


 


ABG Total CO2  29 H   29 H


 


ABG O2 Saturation  98.8 H  


 


Sodium   


 


Potassium   


 


Chloride   


 


Carbon Dioxide   


 


Anion Gap   


 


BUN   


 


Creatinine   


 


BUN/Creatinine Ratio   


 


Glucose   


 


POC Glucose (mg/dL)   222 H 


 


Hemoglobin A1c   


 


Calcium   


 


Magnesium   


 


Troponin I   


 


C-Reactive Protein   


 


Total Protein   


 


Albumin   


 


Albumin/Globulin Ratio   


 


Procalcitonin   


 


Urine Protein   


 


Fluid Appearance   


 


Urine Opiates Screen   


 


Ur Oxycodone Screen   














  07/18/24 07/18/24 07/19/24





  11:24 17:58 00:18


 


WBC   


 


RBC   


 


Hgb   


 


Hct   


 


MCV   


 


MCH   


 


MCHC   


 


RDW   


 


Immature Gran #   


 


Neutrophils #   


 


Neutrophils # (Manual)   


 


Lymphocytes #   


 


Lymphocytes # (Manual)   


 


Monocytes #   


 


Eosinophils # (Manual)   


 


Basophils # (Manual)   


 


Macrocytosis   


 


Macrocytosis (manual)   


 


ESR   


 


INR   


 


APTT   


 


ABG pH   


 


ABG pCO2   


 


ABG pO2   


 


ABG HCO3   


 


ABG Total CO2   


 


ABG O2 Saturation   


 


Sodium   


 


Potassium   


 


Chloride   


 


Carbon Dioxide   


 


Anion Gap   


 


BUN   


 


Creatinine   


 


BUN/Creatinine Ratio   


 


Glucose   


 


POC Glucose (mg/dL)  240 H  151 H  140 H


 


Hemoglobin A1c   


 


Calcium   


 


Magnesium   


 


Troponin I   


 


C-Reactive Protein   


 


Total Protein   


 


Albumin   


 


Albumin/Globulin Ratio   


 


Procalcitonin   


 


Urine Protein   


 


Fluid Appearance   


 


Urine Opiates Screen   


 


Ur Oxycodone Screen   














  07/19/24 07/19/24 07/19/24





  04:55 04:55 06:20


 


WBC  11.4 H  


 


RBC  2.74 L  


 


Hgb  9.0 L  


 


Hct  28.9 L  


 


MCV  105.2 H  


 


MCH   


 


MCHC   


 


RDW   


 


Immature Gran #   


 


Neutrophils #   


 


Neutrophils # (Manual)   


 


Lymphocytes #   


 


Lymphocytes # (Manual)   


 


Monocytes #   


 


Eosinophils # (Manual)   


 


Basophils # (Manual)   


 


Macrocytosis   


 


Macrocytosis (manual)   


 


ESR   


 


INR   


 


APTT   


 


ABG pH   


 


ABG pCO2   


 


ABG pO2   


 


ABG HCO3   


 


ABG Total CO2   


 


ABG O2 Saturation   


 


Sodium   


 


Potassium   


 


Chloride   111 H 


 


Carbon Dioxide   


 


Anion Gap   


 


BUN   28 H 


 


Creatinine   0.52 L 


 


BUN/Creatinine Ratio   


 


Glucose   139 H 


 


POC Glucose (mg/dL)    134 H


 


Hemoglobin A1c   


 


Calcium   7.9 L 


 


Magnesium   1.5 L 


 


Troponin I   


 


C-Reactive Protein   


 


Total Protein   


 


Albumin   


 


Albumin/Globulin Ratio   


 


Procalcitonin   


 


Urine Protein   


 


Fluid Appearance   


 


Urine Opiates Screen   


 


Ur Oxycodone Screen   














  07/19/24 07/20/24 07/20/24





  11:59 00:18 06:50


 


WBC   


 


RBC   


 


Hgb   


 


Hct   


 


MCV   


 


MCH   


 


MCHC   


 


RDW   


 


Immature Gran #   


 


Neutrophils #   


 


Neutrophils # (Manual)   


 


Lymphocytes #   


 


Lymphocytes # (Manual)   


 


Monocytes #   


 


Eosinophils # (Manual)   


 


Basophils # (Manual)   


 


Macrocytosis   


 


Macrocytosis (manual)   


 


ESR   


 


INR   


 


APTT   


 


ABG pH   


 


ABG pCO2   


 


ABG pO2   


 


ABG HCO3   


 


ABG Total CO2   


 


ABG O2 Saturation   


 


Sodium   


 


Potassium   


 


Chloride   


 


Carbon Dioxide   


 


Anion Gap   


 


BUN   


 


Creatinine   


 


BUN/Creatinine Ratio   


 


Glucose   


 


POC Glucose (mg/dL)  179 H  159 H  125 H


 


Hemoglobin A1c   


 


Calcium   


 


Magnesium   


 


Troponin I   


 


C-Reactive Protein   


 


Total Protein   


 


Albumin   


 


Albumin/Globulin Ratio   


 


Procalcitonin   


 


Urine Protein   


 


Fluid Appearance   


 


Urine Opiates Screen   


 


Ur Oxycodone Screen   














  07/20/24 07/20/24 07/20/24





  11:32 16:33 23:08


 


WBC   


 


RBC   


 


Hgb   


 


Hct   


 


MCV   


 


MCH   


 


MCHC   


 


RDW   


 


Immature Gran #   


 


Neutrophils #   


 


Neutrophils # (Manual)   


 


Lymphocytes #   


 


Lymphocytes # (Manual)   


 


Monocytes #   


 


Eosinophils # (Manual)   


 


Basophils # (Manual)   


 


Macrocytosis   


 


Macrocytosis (manual)   


 


ESR   


 


INR   


 


APTT   


 


ABG pH   


 


ABG pCO2   


 


ABG pO2   


 


ABG HCO3   


 


ABG Total CO2   


 


ABG O2 Saturation   


 


Sodium   


 


Potassium   


 


Chloride   


 


Carbon Dioxide   


 


Anion Gap   


 


BUN   


 


Creatinine   


 


BUN/Creatinine Ratio   


 


Glucose   


 


POC Glucose (mg/dL)  153 H  140 H  163 H


 


Hemoglobin A1c   


 


Calcium   


 


Magnesium   


 


Troponin I   


 


C-Reactive Protein   


 


Total Protein   


 


Albumin   


 


Albumin/Globulin Ratio   


 


Procalcitonin   


 


Urine Protein   


 


Fluid Appearance   


 


Urine Opiates Screen   


 


Ur Oxycodone Screen   














  07/21/24 07/21/24 07/21/24





  04:13 04:13 06:24


 


WBC   13.73 H 


 


RBC   3.23 L 


 


Hgb   10.5 L 


 


Hct   32.5 L 


 


MCV   100.6 H 


 


MCH   32.5 H 


 


MCHC   


 


RDW   15.5 H 


 


Immature Gran #   0.13 H 


 


Neutrophils #   10.78 H 


 


Neutrophils # (Manual)   


 


Lymphocytes #   


 


Lymphocytes # (Manual)   


 


Monocytes #   1.28 H 


 


Eosinophils # (Manual)   


 


Basophils # (Manual)   


 


Macrocytosis   


 


Macrocytosis (manual)   


 


ESR   


 


INR   


 


APTT   


 


ABG pH   


 


ABG pCO2   


 


ABG pO2   


 


ABG HCO3   


 


ABG Total CO2   


 


ABG O2 Saturation   


 


Sodium  148 H  


 


Potassium   


 


Chloride   


 


Carbon Dioxide   


 


Anion Gap  15.70 H  


 


BUN   


 


Creatinine   


 


BUN/Creatinine Ratio  35.50 H  


 


Glucose  139 H  


 


POC Glucose (mg/dL)    134 H


 


Hemoglobin A1c   


 


Calcium  8.0 L  


 


Magnesium  1.3 L  


 


Troponin I   


 


C-Reactive Protein   


 


Total Protein   


 


Albumin   


 


Albumin/Globulin Ratio   


 


Procalcitonin   


 


Urine Protein   


 


Fluid Appearance   


 


Urine Opiates Screen   


 


Ur Oxycodone Screen   














  07/21/24 07/21/24 07/21/24





  10:55 16:01 21:23


 


WBC   


 


RBC   


 


Hgb   


 


Hct   


 


MCV   


 


MCH   


 


MCHC   


 


RDW   


 


Immature Gran #   


 


Neutrophils #   


 


Neutrophils # (Manual)   


 


Lymphocytes #   


 


Lymphocytes # (Manual)   


 


Monocytes #   


 


Eosinophils # (Manual)   


 


Basophils # (Manual)   


 


Macrocytosis   


 


Macrocytosis (manual)   


 


ESR   


 


INR   


 


APTT   


 


ABG pH   


 


ABG pCO2   


 


ABG pO2   


 


ABG HCO3   


 


ABG Total CO2   


 


ABG O2 Saturation   


 


Sodium   


 


Potassium   


 


Chloride   


 


Carbon Dioxide   


 


Anion Gap   


 


BUN   


 


Creatinine   


 


BUN/Creatinine Ratio   


 


Glucose   


 


POC Glucose (mg/dL)  139 H  148 H  131 H


 


Hemoglobin A1c   


 


Calcium   


 


Magnesium   


 


Troponin I   


 


C-Reactive Protein   


 


Total Protein   


 


Albumin   


 


Albumin/Globulin Ratio   


 


Procalcitonin   


 


Urine Protein   


 


Fluid Appearance   


 


Urine Opiates Screen   


 


Ur Oxycodone Screen   














  07/22/24 07/22/24 07/22/24





  00:23 05:07 05:43


 


WBC   


 


RBC   


 


Hgb   


 


Hct   


 


MCV   


 


MCH   


 


MCHC   


 


RDW   


 


Immature Gran #   


 


Neutrophils #   


 


Neutrophils # (Manual)   


 


Lymphocytes #   


 


Lymphocytes # (Manual)   


 


Monocytes #   


 


Eosinophils # (Manual)   


 


Basophils # (Manual)   


 


Macrocytosis   


 


Macrocytosis (manual)   


 


ESR   


 


INR   


 


APTT   


 


ABG pH   


 


ABG pCO2   


 


ABG pO2   


 


ABG HCO3   


 


ABG Total CO2   


 


ABG O2 Saturation   


 


Sodium   


 


Potassium   


 


Chloride   


 


Carbon Dioxide   


 


Anion Gap   


 


BUN   22 H 


 


Creatinine   0.45 L 


 


BUN/Creatinine Ratio   


 


Glucose   131 H 


 


POC Glucose (mg/dL)  134 H   133 H


 


Hemoglobin A1c   


 


Calcium   8.0 L 


 


Magnesium   


 


Troponin I   


 


C-Reactive Protein   


 


Total Protein   


 


Albumin   


 


Albumin/Globulin Ratio   


 


Procalcitonin   


 


Urine Protein   


 


Fluid Appearance   


 


Urine Opiates Screen   


 


Ur Oxycodone Screen   














  07/22/24 07/22/24 07/23/24





  11:48 17:15 00:13


 


WBC   


 


RBC   


 


Hgb   


 


Hct   


 


MCV   


 


MCH   


 


MCHC   


 


RDW   


 


Immature Gran #   


 


Neutrophils #   


 


Neutrophils # (Manual)   


 


Lymphocytes #   


 


Lymphocytes # (Manual)   


 


Monocytes #   


 


Eosinophils # (Manual)   


 


Basophils # (Manual)   


 


Macrocytosis   


 


Macrocytosis (manual)   


 


ESR   


 


INR   


 


APTT   


 


ABG pH   


 


ABG pCO2   


 


ABG pO2   


 


ABG HCO3   


 


ABG Total CO2   


 


ABG O2 Saturation   


 


Sodium   


 


Potassium   


 


Chloride   


 


Carbon Dioxide   


 


Anion Gap   


 


BUN   


 


Creatinine   


 


BUN/Creatinine Ratio   


 


Glucose   


 


POC Glucose (mg/dL)  130 H  179 H  137 H


 


Hemoglobin A1c   


 


Calcium   


 


Magnesium   


 


Troponin I   


 


C-Reactive Protein   


 


Total Protein   


 


Albumin   


 


Albumin/Globulin Ratio   


 


Procalcitonin   


 


Urine Protein   


 


Fluid Appearance   


 


Urine Opiates Screen   


 


Ur Oxycodone Screen   














  07/23/24 07/23/24 07/23/24





  05:32 05:32 06:07


 


WBC  23.71 H  


 


RBC  3.45 L  


 


Hgb  11.2 L  


 


Hct  34.2 L  


 


MCV  99.1 H  


 


MCH  32.5 H  


 


MCHC   


 


RDW  15.2 H  


 


Immature Gran #   


 


Neutrophils #   


 


Neutrophils # (Manual)  21.81 H  


 


Lymphocytes #   


 


Lymphocytes # (Manual)  0.71 L  


 


Monocytes #   


 


Eosinophils # (Manual)  0 L  


 


Basophils # (Manual)  0.24 H  


 


Macrocytosis   


 


Macrocytosis (manual)  2+ A  


 


ESR   


 


INR   


 


APTT   


 


ABG pH   


 


ABG pCO2   


 


ABG pO2   


 


ABG HCO3   


 


ABG Total CO2   


 


ABG O2 Saturation   


 


Sodium   


 


Potassium   


 


Chloride   


 


Carbon Dioxide   


 


Anion Gap   17.80 H 


 


BUN   


 


Creatinine   


 


BUN/Creatinine Ratio   23.29 H 


 


Glucose   130 H 


 


POC Glucose (mg/dL)    137 H


 


Hemoglobin A1c   


 


Calcium   7.7 L 


 


Magnesium   0.9 A* 


 


Troponin I   


 


C-Reactive Protein   


 


Total Protein   


 


Albumin   


 


Albumin/Globulin Ratio   


 


Procalcitonin   


 


Urine Protein   


 


Fluid Appearance   


 


Urine Opiates Screen   


 


Ur Oxycodone Screen   














  07/23/24 07/23/24 07/23/24





  11:23 16:18 16:28


 


WBC   


 


RBC   


 


Hgb   


 


Hct   


 


MCV   


 


MCH   


 


MCHC   


 


RDW   


 


Immature Gran #   


 


Neutrophils #   


 


Neutrophils # (Manual)   


 


Lymphocytes #   


 


Lymphocytes # (Manual)   


 


Monocytes #   


 


Eosinophils # (Manual)   


 


Basophils # (Manual)   


 


Macrocytosis   


 


Macrocytosis (manual)   


 


ESR   


 


INR   


 


APTT   


 


ABG pH   


 


ABG pCO2   


 


ABG pO2   


 


ABG HCO3   


 


ABG Total CO2   


 


ABG O2 Saturation   


 


Sodium   


 


Potassium   


 


Chloride   


 


Carbon Dioxide   


 


Anion Gap   


 


BUN   


 


Creatinine   


 


BUN/Creatinine Ratio   


 


Glucose   


 


POC Glucose (mg/dL)  154 H  237 H 


 


Hemoglobin A1c   


 


Calcium   


 


Magnesium   


 


Troponin I   


 


C-Reactive Protein    15.4 H


 


Total Protein   


 


Albumin   


 


Albumin/Globulin Ratio   


 


Procalcitonin   


 


Urine Protein   


 


Fluid Appearance   


 


Urine Opiates Screen   


 


Ur Oxycodone Screen   














  07/23/24 07/23/24 07/24/24





  16:28 22:15 00:22


 


WBC   26.4 H 


 


RBC   2.93 L 


 


Hgb   9.8 L 


 


Hct   30.2 L 


 


MCV   103.1 H 


 


MCH   


 


MCHC   


 


RDW   


 


Immature Gran #   


 


Neutrophils #   


 


Neutrophils # (Manual)   


 


Lymphocytes #   


 


Lymphocytes # (Manual)   


 


Monocytes #   


 


Eosinophils # (Manual)   


 


Basophils # (Manual)   


 


Macrocytosis   


 


Macrocytosis (manual)   


 


ESR   


 


INR   


 


APTT   


 


ABG pH   


 


ABG pCO2   


 


ABG pO2   


 


ABG HCO3   


 


ABG Total CO2   


 


ABG O2 Saturation   


 


Sodium   


 


Potassium   


 


Chloride   


 


Carbon Dioxide   


 


Anion Gap   


 


BUN   


 


Creatinine   


 


BUN/Creatinine Ratio   


 


Glucose   


 


POC Glucose (mg/dL)    218 H


 


Hemoglobin A1c   


 


Calcium   


 


Magnesium   


 


Troponin I   


 


C-Reactive Protein   


 


Total Protein   


 


Albumin   


 


Albumin/Globulin Ratio   


 


Procalcitonin  0.43 H  


 


Urine Protein   


 


Fluid Appearance   


 


Urine Opiates Screen   


 


Ur Oxycodone Screen   














  07/24/24 07/24/24 07/24/24





  06:11 06:34 06:34


 


WBC    27.03 H


 


RBC    2.75 L


 


Hgb    8.9 L


 


Hct    27.4 L


 


MCV    99.6 H


 


MCH    32.4 H


 


MCHC   


 


RDW    15.2 H


 


Immature Gran #    0.37 H


 


Neutrophils #    23.38 H


 


Neutrophils # (Manual)   


 


Lymphocytes #   


 


Lymphocytes # (Manual)   


 


Monocytes #    1.25 H


 


Eosinophils # (Manual)   


 


Basophils # (Manual)   


 


Macrocytosis   


 


Macrocytosis (manual)   


 


ESR   


 


INR   


 


APTT   


 


ABG pH   


 


ABG pCO2   


 


ABG pO2   


 


ABG HCO3   


 


ABG Total CO2   


 


ABG O2 Saturation   


 


Sodium   


 


Potassium   3.3 L 


 


Chloride   95 L 


 


Carbon Dioxide   


 


Anion Gap   15.70 H 


 


BUN   


 


Creatinine   


 


BUN/Creatinine Ratio   27.00 H 


 


Glucose   167 H 


 


POC Glucose (mg/dL)  191 H  


 


Hemoglobin A1c   


 


Calcium   7.0 L 


 


Magnesium   


 


Troponin I   


 


C-Reactive Protein   


 


Total Protein   5.5 L 


 


Albumin   2.7 L 


 


Albumin/Globulin Ratio   0.96 L 


 


Procalcitonin   


 


Urine Protein   


 


Fluid Appearance   


 


Urine Opiates Screen   


 


Ur Oxycodone Screen   














  07/24/24 07/24/24 07/25/24





  11:47 16:35 00:56


 


WBC   


 


RBC   


 


Hgb   


 


Hct   


 


MCV   


 


MCH   


 


MCHC   


 


RDW   


 


Immature Gran #   


 


Neutrophils #   


 


Neutrophils # (Manual)   


 


Lymphocytes #   


 


Lymphocytes # (Manual)   


 


Monocytes #   


 


Eosinophils # (Manual)   


 


Basophils # (Manual)   


 


Macrocytosis   


 


Macrocytosis (manual)   


 


ESR   


 


INR   


 


APTT   


 


ABG pH   


 


ABG pCO2   


 


ABG pO2   


 


ABG HCO3   


 


ABG Total CO2   


 


ABG O2 Saturation   


 


Sodium   


 


Potassium   


 


Chloride   


 


Carbon Dioxide   


 


Anion Gap   


 


BUN   


 


Creatinine   


 


BUN/Creatinine Ratio   


 


Glucose   


 


POC Glucose (mg/dL)  263 H  239 H  181 H


 


Hemoglobin A1c   


 


Calcium   


 


Magnesium   


 


Troponin I   


 


C-Reactive Protein   


 


Total Protein   


 


Albumin   


 


Albumin/Globulin Ratio   


 


Procalcitonin   


 


Urine Protein   


 


Fluid Appearance   


 


Urine Opiates Screen   


 


Ur Oxycodone Screen   














  07/25/24 07/25/24 07/25/24





  06:12 10:13 10:13


 


WBC   11.6 H 


 


RBC   2.72 L 


 


Hgb   9.0 L 


 


Hct   27.9 L 


 


MCV   102.5 H 


 


MCH   


 


MCHC   


 


RDW   


 


Immature Gran #   


 


Neutrophils #   9.8 H 


 


Neutrophils # (Manual)   


 


Lymphocytes #   0.8 L 


 


Lymphocytes # (Manual)   


 


Monocytes #   


 


Eosinophils # (Manual)   


 


Basophils # (Manual)   


 


Macrocytosis   


 


Macrocytosis (manual)   


 


ESR   


 


INR   


 


APTT   


 


ABG pH   


 


ABG pCO2   


 


ABG pO2   


 


ABG HCO3   


 


ABG Total CO2   


 


ABG O2 Saturation   


 


Sodium    135 L


 


Potassium    3.1 L


 


Chloride   


 


Carbon Dioxide   


 


Anion Gap   


 


BUN    23 H


 


Creatinine   


 


BUN/Creatinine Ratio   


 


Glucose    192 H


 


POC Glucose (mg/dL)  154 H  


 


Hemoglobin A1c   


 


Calcium    6.8 L


 


Magnesium    1.3 L


 


Troponin I   


 


C-Reactive Protein   


 


Total Protein   


 


Albumin   


 


Albumin/Globulin Ratio   


 


Procalcitonin   


 


Urine Protein   


 


Fluid Appearance   


 


Urine Opiates Screen   


 


Ur Oxycodone Screen   














  07/25/24 07/25/24 07/25/24





  10:57 11:28 11:28


 


WBC   11.3 H 


 


RBC   2.63 L 


 


Hgb   8.8 L 


 


Hct   27.2 L 


 


MCV   103.3 H 


 


MCH   


 


MCHC   


 


RDW   


 


Immature Gran #   


 


Neutrophils #   9.3 H 


 


Neutrophils # (Manual)   


 


Lymphocytes #   0.8 L 


 


Lymphocytes # (Manual)   


 


Monocytes #   


 


Eosinophils # (Manual)   


 


Basophils # (Manual)   


 


Macrocytosis   


 


Macrocytosis (manual)   


 


ESR   


 


INR    1.2 H


 


APTT    31.5 H


 


ABG pH   


 


ABG pCO2   


 


ABG pO2   


 


ABG HCO3   


 


ABG Total CO2   


 


ABG O2 Saturation   


 


Sodium   


 


Potassium   


 


Chloride   


 


Carbon Dioxide   


 


Anion Gap   


 


BUN   


 


Creatinine   


 


BUN/Creatinine Ratio   


 


Glucose   


 


POC Glucose (mg/dL)  237 H  


 


Hemoglobin A1c   


 


Calcium   


 


Magnesium   


 


Troponin I   


 


C-Reactive Protein   


 


Total Protein   


 


Albumin   


 


Albumin/Globulin Ratio   


 


Procalcitonin   


 


Urine Protein   


 


Fluid Appearance   


 


Urine Opiates Screen   


 


Ur Oxycodone Screen   














  07/25/24 07/25/24





  11:28 11:28


 


WBC  


 


RBC  


 


Hgb  


 


Hct  


 


MCV  


 


MCH  


 


MCHC  


 


RDW  


 


Immature Gran #  


 


Neutrophils #  


 


Neutrophils # (Manual)  


 


Lymphocytes #  


 


Lymphocytes # (Manual)  


 


Monocytes #  


 


Eosinophils # (Manual)  


 


Basophils # (Manual)  


 


Macrocytosis  


 


Macrocytosis (manual)  


 


ESR  


 


INR  


 


APTT  


 


ABG pH  


 


ABG pCO2  


 


ABG pO2  


 


ABG HCO3  


 


ABG Total CO2  


 


ABG O2 Saturation  


 


Sodium  135 L 


 


Potassium  3.2 L 


 


Chloride  


 


Carbon Dioxide  


 


Anion Gap  


 


BUN  25 H 


 


Creatinine  


 


BUN/Creatinine Ratio  


 


Glucose  198 H 


 


POC Glucose (mg/dL)  


 


Hemoglobin A1c  


 


Calcium  6.9 L 


 


Magnesium  


 


Troponin I   0.490 H*


 


C-Reactive Protein  


 


Total Protein  5.4 L 


 


Albumin  2.4 L 


 


Albumin/Globulin Ratio  


 


Procalcitonin  


 


Urine Protein  


 


Fluid Appearance  


 


Urine Opiates Screen  


 


Ur Oxycodone Screen  














Assessment and Plan


Assessment: 





* Acute encephalopathy, unclear cause.  Rule out septic/toxic metabolic 

  encephalopathy.  CVA also in the differential, although no major deficits 

  noted on limited examination.  Only right arm appears slightly weaker than the

   left for drifting but most of the strength is equal.  No significant 

  focality.  NIH stroke scale difficult to assess because of altered mental 

  status.


* Severe left, greater than 75% stenosis left mid common carotid artery and in 

  the origin of the left ICA.  This could be potentially symptomatic.  CVA could

   be related to left ICA stenosis, and septic emboli also possibility with 

  evidence of acute wound infection, and fever 100.3.


* Admission to the hospital for altered mental status and fentanyl overdose.


* Status post extubation 7/18/2024.


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


* Acute kidney injury


* Metabolic acidosis


* Acute systolic congestive heart failure with EF 40%.


* Moderate to severe aortic stenosis.


* CAD with history of bypass grafting 2010.


* Hypertension


* Diabetes


* Hyperlipidemia


* Noncompliance with medication.








Plan: 





* Patient has acute encephalopathy, unclear cause.  Rule out septic or toxic 

  metabolic encephalopathy versus CVA.


* Patient has very mild focal deficits.  Stroke code was activated.  Patient was

   considered not a candidate for tPA, as risks outweigh the benefit.  Discussed

   at great length with patient's family, as well as neurointervention Dr. Valdez

   and all mutually agreed for no thrombolysis.


* CTA of the head and neck was done, which revealed possible significant 

  stenosis of the origin left common carotid artery.  Severe greater than 75% 

  hemodynamically significant stenosis in the mid left common carotid artery and

   in the origin of the left internal carotid artery.  Mild to moderate stenosis

   in the proximal right ICA.  No significant occlusive disease aneurysm or 

  vascular malformation intracranially.


* Stat MRI of the brain, evaluate for acute stroke.


* 2D echo performed 7/12/2024 revealed LVEF 40%.  Moderate MR, moderate to s

  evere aortic stenosis.  Consider KIRSTIE.


* Hemoglobin A1c 6.4


* Fasting lipid panel


* B12 335, homocystine 10.4, RBC folate 509.  Check TSH.  Ammonia is normal 13. 

   Start B12 replacement.


* Telemetry monitoring, rule out arrhythmia.


* EEG rule out any epileptiform activity.  Evaluate for encephalopathy.


* Continue neurochecks.


* Start aspirin regimen.  Patient cannot take anything by mouth, therefore 

  patient will receive aspirin 300 mg rectally.  Once able to take by mouth, 

  would recommend dual antiplatelet therapy.


* DVT prophylaxis: Patient on heparin 5/3 units subcu every 8 hours.


* Other medical management as per IM and other specialties on board.  Patient on

   cefepime 2 g every 8 hours and vancomycin for wound infection.


* Discussed with primary team in detail.


* Neurology will follow.  Thank you for the consult.














Time with Patient: Greater than 30 (Spent at least 1 hour of critical time.)

## 2024-07-25 NOTE — P.PN
Subjective


Progress Note Date: 07/22/24


Principal diagnosis: 





Reason for follow-up is right leg wound and osteomyelitis





The patient is a 73-year-old  male with a past medical history 

significant for diabetes mellitus hypertension hyperlipidemia coronary disease 

prostate disorder, patient did have a history of previous injury to the right 

leg requiring operative repair with hardware and has been dealing with a 

nonhealing wound to the right anterior leg for few years presented to hospital 

mental status changes possible overdose on fentanyl patch with a worsening wound

to the right leg prompting this consultation.


On today's evaluation that is 07/22/2024,the patient remains to be afebrile, 

patient is on 3 L nasal cannula supplemental oxygen patient is more awake alert 

today and denies any shortness of breath no chest pain or cough.Patient denies 

having any nausea or vomiting, no abdominal pain and no diarrhea has been 

reported


Patient did have a creatinine 0.45





Objective





- Vital Signs


Vital signs: 


                                   Vital Signs











Temp  97.7 F   07/22/24 19:38


 


Pulse  90   07/22/24 21:17


 


Resp  18   07/22/24 19:38


 


BP  131/77   07/22/24 19:38


 


Pulse Ox  97   07/22/24 19:38


 


FiO2  30   07/18/24 11:57








                                 Intake & Output











 07/22/24 07/22/24 07/23/24





 06:59 18:59 06:59


 


Intake Total  200 


 


Output Total 1900 1200 


 


Balance -1900 -1000 


 


Weight 86.5 kg 86.5 kg 


 


Intake:   


 


  Oral  200 


 


Output:   


 


  Urine 1900 1200 


 


Other:   


 


  Voiding Method Indwelling Catheter Indwelling Catheter 


 


  # Voids  3 








                       ABP, PAP, CO, CI - Last Documented











Arterial Blood Pressure        118/42

















- Exam





GENERAL DESCRIPTION: An elderly male lying in bed in no distress





RESPIRATORY SYSTEM: Unlabored breathing , decreased breath sounds at bases





HEART: S1 S2 regular rate and rhythm ,





ABDOMEN: Soft , no tenderness





EXTREMITIES: Right leg wound is currently dressed no drainage on the dressing





- Labs


CBC & Chem 7: 


                                 07/25/24 11:28





                                 07/25/24 11:28


Labs: 


                  Abnormal Lab Results - Last 24 Hours (Table)











  07/22/24 07/22/24 07/22/24 Range/Units





  00:23 05:07 05:43 


 


BUN   22 H   (9-20)  mg/dL


 


Creatinine   0.45 L   (0.66-1.25)  mg/dL


 


Glucose   131 H   (74-99)  mg/dL


 


POC Glucose (mg/dL)  134 H   133 H  ()  mg/dL


 


Calcium   8.0 L   (8.4-10.2)  mg/dL














  07/22/24 07/22/24 Range/Units





  11:48 17:15 


 


BUN    (9-20)  mg/dL


 


Creatinine    (0.66-1.25)  mg/dL


 


Glucose    (74-99)  mg/dL


 


POC Glucose (mg/dL)  130 H  179 H  ()  mg/dL


 


Calcium    (8.4-10.2)  mg/dL








                      Microbiology - Last 24 Hours (Table)











 07/12/24 08:30 Acid Fast Bacilli Smear - Preliminary





 Bronchoalviolar Lavage - Right Acid Fast Bacilli Culture - Preliminary














Assessment and Plan


(1) Allergy to multiple antibiotics


Current Visit: Yes   Status: Acute   Code(s): Z88.1 - ALLERGY STATUS TO OTHER 

ANTIBIOTIC AGENTS   SNOMED Code(s): 469875314


   





(2) Leukocytosis


Current Visit: Yes   Status: Acute   Code(s): D72.829 - ELEVATED WHITE BLOOD 

CELL COUNT, UNSPECIFIED   SNOMED Code(s): 196203974


   





(3) Leg wound, right


Current Visit: Yes   Status: Acute   Code(s): S81.801A - UNSPECIFIED OPEN WOUND,

RIGHT LOWER LEG, INITIAL ENCOUNTER   SNOMED Code(s): 15023346337930255


   


Plan: 





1patient presented to hospital with mental status changes possibly fentanyl 

overdose patches has been removed patient also have chronic nonhealing wound to 

the right leg which has been there for many years with exposed hardware and 

likely concerning for osteomyelitis patient wound has increased in size compared

to 2-year ago when I saw the patient last with the hardware exposed and highly 

suspicious for possible osteomyelitis .


2local wound culture currently growing MRSA and bacteroids patient did have 

elevated sed rate of 85


3patient with multiple antibiotic ALLERGIES that would limit the number of 

antibiotic safe to use.


4patient benefit from removal of the infected hardware in the wound bed in 

order to completely heal this infection x-rays were reviewed with the family as 

well as with the radiologist and more likely the upper screw that is visible 

case has been discussed with orthopedics who have evaluated the patient and 

possible planning for removal of the hardware when medically stable


5patient to continue with the daptomycin and Flagyl and monitor clinical course

closely


Dictation was produced using dragon dictation software. please excuse any 

grammatical, word or spelling errors. 








Time with Patient: Less than 30
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
## 2024-07-25 NOTE — P.PN
Subjective


Progress Note Date: 07/25/24


Principal diagnosis: 





Reason for follow-up is right leg wound and osteomyelitis





The patient is a 73-year-old  male with a past medical history 

significant for diabetes mellitus hypertension hyperlipidemia coronary disease 

prostate disorder, patient did have a history of previous injury to the right 

leg requiring operative repair with hardware and has been dealing with a 

nonhealing wound to the right anterior leg for few years presented to hospital 

mental status changes possible overdose on fentanyl patch with a worsening wound

to the right leg prompting this consultation.


On today's evaluation that is 07/25/2024, patient did have a low-grade fever 

100.3 F this morning patient is afebrile since then, the patient seems to be 

doing well this morning subsequently noticed to having significant change in his

mentation and become unresponsive patient was hemodynamically stable did respond

to sternal rub no vomiting diarrhea or any other changes reported by the nursing

staff.


Patient white count did come down to 11.3 creatinine 0.80 blood culture repeat 

currently pending





Objective





- Vital Signs


Vital signs: 


                                   Vital Signs











Temp  100.3 F H  07/25/24 11:00


 


Pulse  108 H  07/25/24 11:00


 


Resp  32 H  07/25/24 11:00


 


BP  109/64   07/25/24 11:00


 


Pulse Ox  96   07/25/24 07:58


 


FiO2  30   07/18/24 11:57








                                 Intake & Output











 07/24/24 07/25/24 07/25/24





 18:59 06:59 18:59


 


Intake Total 850  


 


Output Total 200 2000 1700


 


Balance 650 -2000 -1700


 


Weight  84.9 kg 


 


Intake:   


 


  Oral 850  


 


Output:   


 


  Urine 200 2000 1700


 


Other:   


 


  Voiding Method Indwelling Catheter Indwelling Catheter 








                       ABP, PAP, CO, CI - Last Documented











Arterial Blood Pressure        118/42

















- Exam





GENERAL DESCRIPTION: An elderly male lying in bed in no distress





RESPIRATORY SYSTEM: Unlabored breathing , decreased breath sounds at bases





HEART: S1 S2 regular rate and rhythm ,





ABDOMEN: Soft , no tenderness





EXTREMITIES: Right leg wound is currently dressed no drainage on the dressing





- Labs


CBC & Chem 7: 


                                 07/25/24 11:28





                                 07/25/24 11:28


Labs: 


                  Abnormal Lab Results - Last 24 Hours (Table)











  07/24/24 07/25/24 07/25/24 Range/Units





  16:35 00:56 06:12 


 


WBC     (3.8-10.6)  k/uL


 


RBC     (4.30-5.90)  m/uL


 


Hgb     (13.0-17.5)  gm/dL


 


Hct     (39.0-53.0)  %


 


MCV     (80.0-100.0)  fL


 


Neutrophils #     (1.3-7.7)  k/uL


 


Lymphocytes #     (1.0-4.8)  k/uL


 


INR     (<1.2)  


 


APTT     (22.0-30.0)  sec


 


Sodium     (137-145)  mmol/L


 


Potassium     (3.5-5.1)  mmol/L


 


BUN     (9-20)  mg/dL


 


Glucose     (74-99)  mg/dL


 


POC Glucose (mg/dL)  239 H  181 H  154 H  ()  mg/dL


 


Calcium     (8.4-10.2)  mg/dL


 


Magnesium     (1.6-2.3)  mg/dL


 


Troponin I     (0.000-0.034)  ng/mL


 


Total Protein     (6.3-8.2)  g/dL


 


Albumin     (3.5-5.0)  g/dL














  07/25/24 07/25/24 07/25/24 Range/Units





  10:13 10:13 10:57 


 


WBC  11.6 H    (3.8-10.6)  k/uL


 


RBC  2.72 L    (4.30-5.90)  m/uL


 


Hgb  9.0 L    (13.0-17.5)  gm/dL


 


Hct  27.9 L    (39.0-53.0)  %


 


MCV  102.5 H    (80.0-100.0)  fL


 


Neutrophils #  9.8 H    (1.3-7.7)  k/uL


 


Lymphocytes #  0.8 L    (1.0-4.8)  k/uL


 


INR     (<1.2)  


 


APTT     (22.0-30.0)  sec


 


Sodium   135 L   (137-145)  mmol/L


 


Potassium   3.1 L   (3.5-5.1)  mmol/L


 


BUN   23 H   (9-20)  mg/dL


 


Glucose   192 H   (74-99)  mg/dL


 


POC Glucose (mg/dL)    237 H  ()  mg/dL


 


Calcium   6.8 L   (8.4-10.2)  mg/dL


 


Magnesium   1.3 L   (1.6-2.3)  mg/dL


 


Troponin I     (0.000-0.034)  ng/mL


 


Total Protein     (6.3-8.2)  g/dL


 


Albumin     (3.5-5.0)  g/dL














  07/25/24 07/25/24 07/25/24 Range/Units





  11:28 11:28 11:28 


 


WBC  11.3 H    (3.8-10.6)  k/uL


 


RBC  2.63 L    (4.30-5.90)  m/uL


 


Hgb  8.8 L    (13.0-17.5)  gm/dL


 


Hct  27.2 L    (39.0-53.0)  %


 


MCV  103.3 H    (80.0-100.0)  fL


 


Neutrophils #  9.3 H    (1.3-7.7)  k/uL


 


Lymphocytes #  0.8 L    (1.0-4.8)  k/uL


 


INR   1.2 H   (<1.2)  


 


APTT   31.5 H   (22.0-30.0)  sec


 


Sodium    135 L  (137-145)  mmol/L


 


Potassium    3.2 L  (3.5-5.1)  mmol/L


 


BUN    25 H  (9-20)  mg/dL


 


Glucose    198 H  (74-99)  mg/dL


 


POC Glucose (mg/dL)     ()  mg/dL


 


Calcium    6.9 L  (8.4-10.2)  mg/dL


 


Magnesium     (1.6-2.3)  mg/dL


 


Troponin I     (0.000-0.034)  ng/mL


 


Total Protein    5.4 L  (6.3-8.2)  g/dL


 


Albumin    2.4 L  (3.5-5.0)  g/dL














  07/25/24 Range/Units





  11:28 


 


WBC   (3.8-10.6)  k/uL


 


RBC   (4.30-5.90)  m/uL


 


Hgb   (13.0-17.5)  gm/dL


 


Hct   (39.0-53.0)  %


 


MCV   (80.0-100.0)  fL


 


Neutrophils #   (1.3-7.7)  k/uL


 


Lymphocytes #   (1.0-4.8)  k/uL


 


INR   (<1.2)  


 


APTT   (22.0-30.0)  sec


 


Sodium   (137-145)  mmol/L


 


Potassium   (3.5-5.1)  mmol/L


 


BUN   (9-20)  mg/dL


 


Glucose   (74-99)  mg/dL


 


POC Glucose (mg/dL)   ()  mg/dL


 


Calcium   (8.4-10.2)  mg/dL


 


Magnesium   (1.6-2.3)  mg/dL


 


Troponin I  0.490 H*  (0.000-0.034)  ng/mL


 


Total Protein   (6.3-8.2)  g/dL


 


Albumin   (3.5-5.0)  g/dL








                      Microbiology - Last 24 Hours (Table)











 07/23/24 16:35 Blood Culture - Preliminary





 Blood 














Assessment and Plan


(1) Allergy to multiple antibiotics


Current Visit: Yes   Status: Acute   Code(s): Z88.1 - ALLERGY STATUS TO OTHER 

ANTIBIOTIC AGENTS   SNOMED Code(s): 386553635


   





(2) Leukocytosis


Current Visit: Yes   Status: Acute   Code(s): D72.829 - ELEVATED WHITE BLOOD 

CELL COUNT, UNSPECIFIED   SNOMED Code(s): 794718254


   





(3) Leg wound, right


Current Visit: Yes   Status: Acute   Code(s): S81.801A - UNSPECIFIED OPEN WOUND,

RIGHT LOWER LEG, INITIAL ENCOUNTER   SNOMED Code(s): 99532040425223015


   


Plan: 





1patient presented to hospital with mental status changes possibly fentanyl 

overdose patches has been removed patient also have chronic nonhealing wound to 

the right leg which has been there for many years with exposed hardware and 

likely concerning for osteomyelitis patient wound has increased in size compared

to 2-year ago when I saw the patient last with the hardware exposed and highly 

suspicious for possible osteomyelitis .


2local wound culture currently growing MRSA and bacteroids patient did have 

elevated sed rate of 85


3patient with multiple antibiotic ALLERGIES that would limit the number of 

antibiotic safe to use.


4patient benefit from removal of the infected hardware in the wound bed in 

order to completely heal this infection x-rays were reviewed with the family as 

well as with the radiologist and more likely the upper screw that is visible 

case has been discussed with orthopedics patient was evaluated and currently 

waiting for tomorrow of the hardware


5patient did have improvement in the white count however significant change in 

mentation questionable CVA/TIA neurology consulted for now continue with the 

vancomycin cefepime and Flagyl daughter at the bedside questions Answered


Dictation was produced using dragon dictation software. please excuse any 

grammatical, word or spelling errors. 








Time with Patient: Less than 30

## 2024-07-25 NOTE — CT
Head CT without contrast

 

HISTORY: Code stroke

 

COMPARISON: 7/10/2024.

 

TECHNIQUE: Multiple axial images are taken from skull base to vertex without use of IV contrast mater
ial.

 

FINDINGS:

 

The ventricles, basal cisterns and sulci over the convexities are moderately dilated consistent with 
moderate generalized atrophy but appropriate for the patient's age. There is no mass effect or shift 
of midline structures.

 

No abnormal density is seen throughout the brain parenchyma and there is no acute intra or extra-axia
l hemorrhage.

 

The posterior fossa including the brainstem, fourth ventricle and cerebellar pontine angles appear no
rmal.

 

Intraorbital contents appear normal and symmetric.

 

Visualized paranasal sinuses and mastoid air cells are well aerated. 

 

The calvarium is intact.

 

IMPRESSION:

 

1. No acute bleed or mass effect.

2. Age-appropriate senescent changes.

## 2024-07-26 LAB
CHOLEST SERPL-MCNC: 121 MG/DL (ref 0–200)
GLUCOSE BLD-MCNC: 173 MG/DL (ref 70–110)
GLUCOSE BLD-MCNC: 206 MG/DL (ref 70–110)
GLUCOSE BLD-MCNC: 220 MG/DL (ref 70–110)
GLUCOSE BLD-MCNC: 231 MG/DL (ref 70–110)
HDLC SERPL-MCNC: 36.2 MG/DL (ref 40–60)
LDLC SERPL CALC-MCNC: 66.8 MG/DL (ref 0–131)
TRIGL SERPL-MCNC: 90.1 MG/DL (ref 0–149)
VLDLC SERPL CALC-MCNC: 18.02 MG/DL (ref 5–40)

## 2024-07-26 RX ADMIN — METOPROLOL TARTRATE SCH MG: 25 TABLET, FILM COATED ORAL at 09:31

## 2024-07-26 RX ADMIN — SODIUM CHLORIDE SCH MLS/HR: 9 INJECTION, SOLUTION INTRAVENOUS at 22:30

## 2024-07-26 RX ADMIN — FUROSEMIDE SCH: 10 INJECTION, SOLUTION INTRAMUSCULAR; INTRAVENOUS at 17:40

## 2024-07-26 RX ADMIN — ATORVASTATIN CALCIUM SCH MG: 40 TABLET, FILM COATED ORAL at 20:30

## 2024-07-26 RX ADMIN — FUROSEMIDE SCH MG: 10 INJECTION, SOLUTION INTRAMUSCULAR; INTRAVENOUS at 09:30
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
## 2024-07-26 NOTE — MR
EXAMINATION TYPE: MR brain wo/w con

 

DATE OF EXAM: 7/26/2024 12:47 PM

 

COMPARISON: NONE

 

HISTORY: Evaluate for CVA, hyperkalemia, GERMAN, overdose, suicide.

 

CONTRAST: Patient received 8 mL intravenous Gadavist gadolinium contrast.  

 

Multiplanar and multispin-echo imaging of the brain was performed .  Pre and post contrast enhanced i
mages are obtained.

 

The ventricles, basal cisterns and sulci overlying the cerebral convexities are mildly enlarged.  

 

There is evidence of mild periventricular white matter ischemic demyelination.  

 

Remote deep white matter insults are also noted.  

 

No acute edema is seen on diffusion weighted  imaging.   

 

There is no evidence for midline shift or mass effect.  

 

Acute intracranial hemorrhage or extra-axial collection is not evident.   

 

No enhancing lesions are seen.  

 

The paranasal sinuses are well-aerated. 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
n right.

 

IMPRESSION:    

 

Age-related atrophic and chronic small vessel ischemic change.  No acute intracranial process at this
 time.  

No enhancing lesions are seen.

## 2024-07-26 NOTE — EEG
ELECTROENCEPHALOGRAM REPORT



PREAMBLE:

This is a 73-year-old male with altered mental status.



EEG FINDINGS:

This is a 21-channel digital EEG recorded with video component, utilizing 10/20

international system with referential and bipolar montages.  The recording starts with

patient being asleep with presence of diffuse mixed delta and theta frequency rhythm

with some sleep spindles.  On alerting the patient, the patient has a fairly normal

posterior dominant 9 to 10 hertz background alpha activity, reactive to eye opening and

closing.  Photic driving response was seen with some flash frequencies.  During middle

and later part of the study, the patient again went into stage 2 sleep.  No focal or

lateralized epileptiform activity was seen.



IMPRESSION:

This is a normal EEG during wakefulness, drowsiness, and stage 2 sleep.  No focal,

lateralized, or epileptiform activity was seen.





MMODL / IJN: [id number] / Job#: [id number]

## 2024-07-26 NOTE — P.PN
Subjective





HISTORY OF PRESENT ILLNESS: 








The patient is a 73-year-old male with past medical history of hypertension, 

diabetes, dyslipidemia, coronary artery disease status post CABG with LIMA to 

LAD and venous graft to OM 1 and 2. Echocardiogram revealed mildly reduced LV 

function at 40% with anterior wall hypokinesis as well as moderate to severe 

aortic stenosis.  Cardiology was initially consulted for aortic stenosis.  

Cardiology has signed off the case back on 7/16.  We have been reconsulted for 

urgent evaluation of cardiomyopathy and tachycardia.  Blood pressure 125/63, 

heart rate was in the 120s, afebrile, pulse ox 90% - 96% on 4 L nasal cannula.  

Temperature max has been 100.3 axillary..  Patient was found to have mental 

status changes this morning and  A-Team was called followed by Stroke Team and 

patient underwent stat CAT scan of the brain which was unremarkable is well as 

CTA of the head and neck.  CTA revealed possible stenosis of the origin left 

common carotid artery, severe greater than 75% stenosis in the mid left common 

carotid artery and in the origin of the left internal carotid artery.  Mild to 

moderate stenosis in the proximal right internal carotid artery.  No significant

occlusive disease, aneurysm or vascular malformation intracranially.  Consults 

were added for neurology as well as vascular surgery.  Most recent laboratory 

studies revealed WBC 11.6, hemoglobin 9.  Creatinine 0.78.  Yesterday potassium 

was 3.3 and magnesium 1.5.  At the time of evaluation, heart rate is in the 90s.

 EKG was sinus tachycardia with right bundle branch block at 104 bpm. Current 

cardiac medications: Lasix 20 mg IV push every 12 hours, Lopressor 12.5 mg twice

daily, potassium chloride.





7/26/2024


Patient examined this morning the bedside.  Patient currently denies chest pain 

or pressure.  He reports shortness of breath this morning.  He remains on IV 

diuretics.  Telemetry reveals sinus tachycardia with heart rate in the 120s.  

Blood pressure stable.





PHYSICAL EXAM: 


VITAL SIGNS: Reviewed.


GENERAL: Well-developed in no acute distress. 


NECK: Supple. No JVD or thyromegaly


LUNGS: Respirations even and unlabored. Lungs with bilateral wheezing and 

bibasilar crackles


HEART: Tachycardic.  Regular rate and rhythm.  S1 and S2 heard.  Systolic murmur

noted


EXTREMITIES: Normal range of motion.  No clubbing or cyanosis.  Peripheral 

pulses intact.  No lower extremity edema.  Wound to right lower extremity





ASSESSMENT: 


Suicide attempt with overdose on fentanyl patches


Acute hypoxic respiratory failure quiring mechanical ventilation, since 

extubated


Moderate to severe aortic stenosis


Ischemic cardiomyopathy, EF 40 to 45%


Sinus tachycardia


Encephalopathy


Hypertension


Diabetes


Dyslipidemia


History of CAD with prior CABG


Nonhealing wound to the right anterior leg, cultures positive for MSSA





PLAN: 


Increase Lasix to 40 mg IV every 12 hours


Daily weights, accurate intake and output, and monitoring of kidney function


Increase metoprolol to 25 mg twice a day


Continue additional cardiac medications


Further recommendations pending patient course








Nurse practitioner note has been reviewed by physician. Signing provider agrees 

with the documented findings, assessment, and plan of care documented by NP as a

scribe.








Objective





- Vital Signs


Vital signs: 


                                   Vital Signs











Temp  98.2 F   07/26/24 11:26


 


Pulse  117 H  07/26/24 11:26


 


Resp  28 H  07/26/24 11:26


 


BP  115/63   07/26/24 11:26


 


Pulse Ox  92 L  07/26/24 11:26


 


FiO2  30   07/18/24 11:57








                                 Intake & Output











 07/25/24 07/26/24 07/26/24





 18:59 06:59 18:59


 


Intake Total  550 138


 


Output Total 3175 2350 1100


 


Balance -5915 -1800 -962


 


Weight 84.9 kg 86 kg 


 


Intake:   


 


  IV  10 20


 


    Invasive Line 10  10 10


 


    Invasive Line 9   10


 


  Oral  540 118


 


Output:   


 


  Urine 3175 2350 1100


 


    Uretheral (Seo)  550 


 


Other:   


 


  Voiding Method Indwelling Catheter Indwelling Catheter Indwelling Catheter








                       ABP, PAP, CO, CI - Last Documented











Arterial Blood Pressure        118/42

















- Labs


CBC & Chem 7: 


                                 07/25/24 11:28





                                 07/26/24 05:34


Labs: 


                  Abnormal Lab Results - Last 24 Hours (Table)











  07/25/24 07/25/24 07/25/24 Range/Units





  11:28 11:28 11:28 


 


WBC  11.3 H    (3.8-10.6)  k/uL


 


RBC  2.63 L    (4.30-5.90)  m/uL


 


Hgb  8.8 L    (13.0-17.5)  gm/dL


 


Hct  27.2 L    (39.0-53.0)  %


 


MCV  103.3 H    (80.0-100.0)  fL


 


Neutrophils #  9.3 H    (1.3-7.7)  k/uL


 


Lymphocytes #  0.8 L    (1.0-4.8)  k/uL


 


INR   1.2 H   (<1.2)  


 


APTT   31.5 H   (22.0-30.0)  sec


 


ABG pO2     ()  mmHg


 


ABG HCO3     (21-25)  mmol/L


 


ABG Total CO2     (19-24)  mmol/L


 


Sodium    135 L  (137-145)  mmol/L


 


Potassium    3.2 L  (3.5-5.1)  mmol/L


 


BUN    25 H  (9-20)  mg/dL


 


Glucose    198 H  (74-99)  mg/dL


 


POC Glucose (mg/dL)     ()  mg/dL


 


Calcium    6.9 L  (8.4-10.2)  mg/dL


 


Troponin I     (0.000-0.034)  ng/mL


 


Total Protein    5.4 L  (6.3-8.2)  g/dL


 


Albumin    2.4 L  (3.5-5.0)  g/dL


 


HDL Cholesterol     (40.00-60.00)  mg/dL














  07/25/24 07/25/24 07/25/24 Range/Units





  11:28 13:08 17:14 


 


WBC     (3.8-10.6)  k/uL


 


RBC     (4.30-5.90)  m/uL


 


Hgb     (13.0-17.5)  gm/dL


 


Hct     (39.0-53.0)  %


 


MCV     (80.0-100.0)  fL


 


Neutrophils #     (1.3-7.7)  k/uL


 


Lymphocytes #     (1.0-4.8)  k/uL


 


INR     (<1.2)  


 


APTT     (22.0-30.0)  sec


 


ABG pO2   74 L   ()  mmHg


 


ABG HCO3   31 H   (21-25)  mmol/L


 


ABG Total CO2   33 H   (19-24)  mmol/L


 


Sodium     (137-145)  mmol/L


 


Potassium     (3.5-5.1)  mmol/L


 


BUN     (9-20)  mg/dL


 


Glucose     (74-99)  mg/dL


 


POC Glucose (mg/dL)    187 H  ()  mg/dL


 


Calcium     (8.4-10.2)  mg/dL


 


Troponin I  0.490 H*    (0.000-0.034)  ng/mL


 


Total Protein     (6.3-8.2)  g/dL


 


Albumin     (3.5-5.0)  g/dL


 


HDL Cholesterol     (40.00-60.00)  mg/dL














  07/25/24 07/25/24 07/26/24 Range/Units





  20:23 23:45 05:34 


 


WBC     (3.8-10.6)  k/uL


 


RBC     (4.30-5.90)  m/uL


 


Hgb     (13.0-17.5)  gm/dL


 


Hct     (39.0-53.0)  %


 


MCV     (80.0-100.0)  fL


 


Neutrophils #     (1.3-7.7)  k/uL


 


Lymphocytes #     (1.0-4.8)  k/uL


 


INR     (<1.2)  


 


APTT     (22.0-30.0)  sec


 


ABG pO2     ()  mmHg


 


ABG HCO3     (21-25)  mmol/L


 


ABG Total CO2     (19-24)  mmol/L


 


Sodium     (137-145)  mmol/L


 


Potassium     (3.5-5.1)  mmol/L


 


BUN     (9-20)  mg/dL


 


Glucose     (74-99)  mg/dL


 


POC Glucose (mg/dL)  187 H  206 H   ()  mg/dL


 


Calcium     (8.4-10.2)  mg/dL


 


Troponin I     (0.000-0.034)  ng/mL


 


Total Protein     (6.3-8.2)  g/dL


 


Albumin     (3.5-5.0)  g/dL


 


HDL Cholesterol    36.20 L  (40.00-60.00)  mg/dL














  07/26/24 Range/Units





  05:58 


 


WBC   (3.8-10.6)  k/uL


 


RBC   (4.30-5.90)  m/uL


 


Hgb   (13.0-17.5)  gm/dL


 


Hct   (39.0-53.0)  %


 


MCV   (80.0-100.0)  fL


 


Neutrophils #   (1.3-7.7)  k/uL


 


Lymphocytes #   (1.0-4.8)  k/uL


 


INR   (<1.2)  


 


APTT   (22.0-30.0)  sec


 


ABG pO2   ()  mmHg


 


ABG HCO3   (21-25)  mmol/L


 


ABG Total CO2   (19-24)  mmol/L


 


Sodium   (137-145)  mmol/L


 


Potassium   (3.5-5.1)  mmol/L


 


BUN   (9-20)  mg/dL


 


Glucose   (74-99)  mg/dL


 


POC Glucose (mg/dL)  173 H  ()  mg/dL


 


Calcium   (8.4-10.2)  mg/dL


 


Troponin I   (0.000-0.034)  ng/mL


 


Total Protein   (6.3-8.2)  g/dL


 


Albumin   (3.5-5.0)  g/dL


 


HDL Cholesterol   (40.00-60.00)  mg/dL








                      Microbiology - Last 24 Hours (Table)











 07/23/24 16:35 Blood Culture - Preliminary





 Blood
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
## 2024-07-27 LAB
ANION GAP SERPL CALC-SCNC: 5 MMOL/L
BASOPHILS # BLD AUTO: 0.1 K/UL (ref 0–0.2)
BASOPHILS NFR BLD AUTO: 1 %
BUN SERPL-SCNC: 25 MG/DL (ref 9–20)
CALCIUM SPEC-MCNC: 7.7 MG/DL (ref 8.4–10.2)
CHLORIDE SERPL-SCNC: 104 MMOL/L (ref 98–107)
CO2 SERPL-SCNC: 28 MMOL/L (ref 22–30)
EOSINOPHIL # BLD AUTO: 0.3 K/UL (ref 0–0.7)
EOSINOPHIL NFR BLD AUTO: 3 %
ERYTHROCYTE [DISTWIDTH] IN BLOOD BY AUTOMATED COUNT: 3.28 M/UL (ref 4.3–5.9)
ERYTHROCYTE [DISTWIDTH] IN BLOOD: 14.7 % (ref 11.5–15.5)
GLUCOSE BLD-MCNC: 168 MG/DL (ref 70–110)
GLUCOSE BLD-MCNC: 176 MG/DL (ref 70–110)
GLUCOSE BLD-MCNC: 236 MG/DL (ref 70–110)
GLUCOSE BLD-MCNC: 274 MG/DL (ref 70–110)
GLUCOSE SERPL-MCNC: 197 MG/DL (ref 74–99)
HCT VFR BLD AUTO: 33.9 % (ref 39–53)
HGB BLD-MCNC: 10.5 GM/DL (ref 13–17.5)
LYMPHOCYTES # SPEC AUTO: 1.1 K/UL (ref 1–4.8)
LYMPHOCYTES NFR SPEC AUTO: 11 %
MAGNESIUM SPEC-SCNC: 1.2 MG/DL (ref 1.6–2.3)
MCH RBC QN AUTO: 32 PG (ref 25–35)
MCHC RBC AUTO-ENTMCNC: 30.9 G/DL (ref 31–37)
MCV RBC AUTO: 103.4 FL (ref 80–100)
MONOCYTES # BLD AUTO: 0.6 K/UL (ref 0–1)
MONOCYTES NFR BLD AUTO: 6 %
NEUTROPHILS # BLD AUTO: 7.5 K/UL (ref 1.3–7.7)
NEUTROPHILS NFR BLD AUTO: 77 %
PLATELET # BLD AUTO: 422 K/UL (ref 150–450)
POTASSIUM SERPL-SCNC: 4.3 MMOL/L (ref 3.5–5.1)
SODIUM SERPL-SCNC: 137 MMOL/L (ref 137–145)
WBC # BLD AUTO: 9.7 K/UL (ref 3.8–10.6)

## 2024-07-27 RX ADMIN — MAGNESIUM SULFATE IN DEXTROSE SCH MLS/HR: 10 INJECTION, SOLUTION INTRAVENOUS at 10:00

## 2024-07-27 RX ADMIN — METOPROLOL TARTRATE SCH MG: 50 TABLET, FILM COATED ORAL at 10:01
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
## 2024-07-27 NOTE — P.PN
Subjective


Progress Note Date: 07/27/24


Principal diagnosis: 





Reason for follow-up is right leg wound and osteomyelitis





The patient is a 73-year-old  male with a past medical history 

significant for diabetes mellitus hypertension hyperlipidemia coronary disease 

prostate disorder, patient did have a history of previous injury to the right 

leg requiring operative repair with hardware and has been dealing with a 

nonhealing wound to the right anterior leg for few years presented to hospital 

mental status changes possible overdose on fentanyl patch with a worsening wound

to the right leg prompting this consultation.


On today's evaluation that is 07/27/2024,the patient did have a low-grade fever 

100.4 last night the patient is afebrile this morning, patient is breathing 

comfortably on 1 L nasal cannula oxygen, the patient denies  chest pain 

shortness of breath patient did have a cough and has been Sputum Mention He Was 

Able to Provide a Sample Last Night, patient denies abdominal pain, no nausea 

vomiting or diarrhea.


Patient white count normalized to 9.7 creatinine 0.7 5 repeat blood and sputum 

cultures currently pending





Objective





- Vital Signs


Vital signs: 


                                   Vital Signs











Temp  98.3 F   07/27/24 11:18


 


Pulse  111 H  07/27/24 11:18


 


Resp  28 H  07/27/24 11:18


 


BP  108/69   07/27/24 11:18


 


Pulse Ox  94 L  07/27/24 11:18


 


FiO2  30   07/18/24 11:57








                                 Intake & Output











 07/26/24 07/27/24 07/27/24





 18:59 06:59 18:59


 


Intake Total 138 1080 378


 


Output Total 2200 350 


 


Balance -2062 730 378


 


Weight  87 kg 


 


Intake:   


 


  IV 20  20


 


    Invasive Line 10 10  10


 


    Invasive Line 9 10  10


 


  Oral 118 1080 358


 


Output:   


 


  Urine 2200 350 


 


Other:   


 


  Voiding Method Toilet Toilet Toilet





 Urinal Urinal Urinal





 Incontinent Incontinent Incontinent


 


  # Voids  1 








                       ABP, PAP, CO, CI - Last Documented











Arterial Blood Pressure        118/42

















- Exam





GENERAL DESCRIPTION: An elderly male lying in bed in no distress





RESPIRATORY SYSTEM: Unlabored breathing , decreased breath sounds at bases





HEART: S1 S2 regular rate and rhythm ,





ABDOMEN: Soft , no tenderness





EXTREMITIES: Right leg wound is currently dressed no drainage on the dressing





- Labs


CBC & Chem 7: 


                                 07/27/24 07:41





                                 07/27/24 07:41


Labs: 


                  Abnormal Lab Results - Last 24 Hours (Table)











  07/26/24 07/26/24 07/27/24 Range/Units





  11:59 16:45 00:27 


 


RBC     (4.30-5.90)  m/uL


 


Hgb     (13.0-17.5)  gm/dL


 


Hct     (39.0-53.0)  %


 


MCV     (80.0-100.0)  fL


 


MCHC     (31.0-37.0)  g/dL


 


BUN     (9-20)  mg/dL


 


Glucose     (74-99)  mg/dL


 


POC Glucose (mg/dL)  220 H  231 H  168 H  ()  mg/dL


 


Calcium     (8.4-10.2)  mg/dL


 


Magnesium     (1.6-2.3)  mg/dL














  07/27/24 07/27/24 07/27/24 Range/Units





  06:26 07:41 07:41 


 


RBC   3.28 L   (4.30-5.90)  m/uL


 


Hgb   10.5 L   (13.0-17.5)  gm/dL


 


Hct   33.9 L   (39.0-53.0)  %


 


MCV   103.4 H   (80.0-100.0)  fL


 


MCHC   30.9 L   (31.0-37.0)  g/dL


 


BUN    25 H  (9-20)  mg/dL


 


Glucose    197 H  (74-99)  mg/dL


 


POC Glucose (mg/dL)  176 H    ()  mg/dL


 


Calcium    7.7 L  (8.4-10.2)  mg/dL


 


Magnesium    1.2 L  (1.6-2.3)  mg/dL














  07/27/24 Range/Units





  11:33 


 


RBC   (4.30-5.90)  m/uL


 


Hgb   (13.0-17.5)  gm/dL


 


Hct   (39.0-53.0)  %


 


MCV   (80.0-100.0)  fL


 


MCHC   (31.0-37.0)  g/dL


 


BUN   (9-20)  mg/dL


 


Glucose   (74-99)  mg/dL


 


POC Glucose (mg/dL)  274 H  ()  mg/dL


 


Calcium   (8.4-10.2)  mg/dL


 


Magnesium   (1.6-2.3)  mg/dL








                      Microbiology - Last 24 Hours (Table)











 07/26/24 19:27 Gram Stain - Preliminary





 Sputum 


 


 07/23/24 16:35 Blood Culture - Preliminary





 Blood 


 


 07/12/24 08:30 Fungal Culture - Preliminary





 Bronchoalviolar Lavage - Right 














Assessment and Plan


(1) Allergy to multiple antibiotics


Current Visit: Yes   Status: Acute   Code(s): Z88.1 - ALLERGY STATUS TO OTHER 

ANTIBIOTIC AGENTS   SNOMED Code(s): 101178789


   





(2) Leukocytosis


Current Visit: Yes   Status: Acute   Code(s): D72.829 - ELEVATED WHITE BLOOD 

CELL COUNT, UNSPECIFIED   SNOMED Code(s): 600992336


   





(3) Leg wound, right


Current Visit: Yes   Status: Acute   Code(s): S81.801A - UNSPECIFIED OPEN WOUND,

RIGHT LOWER LEG, INITIAL ENCOUNTER   SNOMED Code(s): 70495025865484730


   


Plan: 





1patient presented to hospital with mental status changes possibly fentanyl 

overdose patches has been removed patient also have chronic nonhealing wound to 

the right leg which has been there for many years with exposed hardware and 

likely concerning for osteomyelitis patient wound has increased in size compared

to 2-year ago when I saw the patient last with the hardware exposed and highly 

suspicious for possible osteomyelitis .


2local wound culture currently growing MRSA and bacteroids patient did have 

elevated sed rate of 85


3patient with multiple antibiotic ALLERGIES that would limit the number of 

antibiotic safe to use.


4patient benefit from removal of the infected hardware in the wound bed in 

order to completely heal this infection x-rays were reviewed with the family as 

well as with the radiologist and more likely the upper screw that is visible 

case has been discussed with orthopedics patient was evaluated and was scheduled

for removal of the hardware subsequently orthopedic has signed off


5patient did have slight worsening of his respiratory status also develop a 

fever concerning for possible aspiration pneumonitis blood and sputum culture 

currently pending


6- patient to continue vancomycin cefepime and Flagyl while waiting for the 

culture to finalize





Dictation was produced using dragon dictation software. please excuse any 

grammatical, word or spelling errors. 








Time with Patient: Less than 30

## 2024-07-27 NOTE — P.PN
Subjective


Progress Note Date: 07/27/24





This is a 73-year-old gentleman with coronary artery disease status post CABG 

and valvular heart disease known moderate to severe aortic stenosis as well as 

cardiomyopathy with a EF around 40% as well as multiple comorbid conditions was 

admitted to the hospital initially with suicidal attempt.  We have been treating

the patient for heart failure.





July 27, 2024


The patient was seen and evaluated this morning.  He is still hypoxic requiring 

oxygen.  Hemodynamically he is stable but he still in mild sinus tachycardia 

with heart rate above 100 beats per minutes.  Currently he is on Lasix IV and he

is on metoprolol as well.  Urine output has been adequate.  Examination is 

remarkable for regular rhythm with a crescendo decrescendo murmur at right upper

sternal border and diminished breathing sounds bilaterally and no edema was 

noted in the lower extremities





Assessment


Heart failure with reduced ejection fraction


Sinus tachycardia


CAD status post CABG


Cardiomyopathy


Multiple comorbid conditions





Plan


Continue the current dose of Lasix IV


Continue monitor the kidney function and electrolytes


Increase the dose of beta-blocker


Follow-up with the patient





Objective





- Vital Signs


Vital signs: 


                                   Vital Signs











Temp  98.6 F   07/27/24 02:00


 


Pulse  84   07/27/24 02:00


 


Resp  16   07/27/24 02:00


 


BP  105/68   07/27/24 02:00


 


Pulse Ox  94 L  07/27/24 02:00


 


FiO2  30   07/18/24 11:57








                                 Intake & Output











 07/26/24 07/27/24 07/27/24





 18:59 06:59 18:59


 


Intake Total 138 1080 358


 


Output Total 2200 350 


 


Balance -2062 730 358


 


Weight  87 kg 


 


Intake:   


 


  IV 20  


 


    Invasive Line 10 10  


 


    Invasive Line 9 10  


 


  Oral 118 1080 358


 


Output:   


 


  Urine 2200 350 


 


Other:   


 


  Voiding Method Toilet Toilet 





 Urinal Urinal 





 Incontinent Incontinent 


 


  # Voids  1 








                       ABP, PAP, CO, CI - Last Documented











Arterial Blood Pressure        118/42

















- Labs


CBC & Chem 7: 


                                 07/27/24 07:41





                                 07/27/24 07:41


Labs: 


                  Abnormal Lab Results - Last 24 Hours (Table)











  07/26/24 07/26/24 07/26/24 Range/Units





  05:34 11:59 16:45 


 


RBC     (4.30-5.90)  m/uL


 


Hgb     (13.0-17.5)  gm/dL


 


Hct     (39.0-53.0)  %


 


MCV     (80.0-100.0)  fL


 


MCHC     (31.0-37.0)  g/dL


 


BUN     (9-20)  mg/dL


 


Glucose     (74-99)  mg/dL


 


POC Glucose (mg/dL)   220 H  231 H  ()  mg/dL


 


Calcium     (8.4-10.2)  mg/dL


 


Magnesium     (1.6-2.3)  mg/dL


 


HDL Cholesterol  36.20 L    (40.00-60.00)  mg/dL














  07/27/24 07/27/24 07/27/24 Range/Units





  00:27 06:26 07:41 


 


RBC    3.28 L  (4.30-5.90)  m/uL


 


Hgb    10.5 L  (13.0-17.5)  gm/dL


 


Hct    33.9 L  (39.0-53.0)  %


 


MCV    103.4 H  (80.0-100.0)  fL


 


MCHC    30.9 L  (31.0-37.0)  g/dL


 


BUN     (9-20)  mg/dL


 


Glucose     (74-99)  mg/dL


 


POC Glucose (mg/dL)  168 H  176 H   ()  mg/dL


 


Calcium     (8.4-10.2)  mg/dL


 


Magnesium     (1.6-2.3)  mg/dL


 


HDL Cholesterol     (40.00-60.00)  mg/dL














  07/27/24 Range/Units





  07:41 


 


RBC   (4.30-5.90)  m/uL


 


Hgb   (13.0-17.5)  gm/dL


 


Hct   (39.0-53.0)  %


 


MCV   (80.0-100.0)  fL


 


MCHC   (31.0-37.0)  g/dL


 


BUN  25 H  (9-20)  mg/dL


 


Glucose  197 H  (74-99)  mg/dL


 


POC Glucose (mg/dL)   ()  mg/dL


 


Calcium  7.7 L  (8.4-10.2)  mg/dL


 


Magnesium  1.2 L  (1.6-2.3)  mg/dL


 


HDL Cholesterol   (40.00-60.00)  mg/dL








                      Microbiology - Last 24 Hours (Table)











 07/23/24 16:35 Blood Culture - Preliminary





 Blood 


 


 07/12/24 08:30 Fungal Culture - Preliminary





 Bronchoalviolar Lavage - Right

## 2024-07-27 NOTE — P.PN
Subjective


Progress Note Date: 07/26/24





Patient was seen for a follow-up.  Patient complaining of burning in the eyes.  

All neurological symptoms have resolved.  Patient's daughter was also present.





Objective





- Vital Signs


Vital signs: 


                                   Vital Signs











Temp  98.2 F   07/26/24 11:26


 


Pulse  117 H  07/26/24 11:26


 


Resp  28 H  07/26/24 11:26


 


BP  115/63   07/26/24 11:26


 


Pulse Ox  92 L  07/26/24 11:26


 


FiO2  30   07/18/24 11:57








                                 Intake & Output











 07/25/24 07/26/24 07/26/24





 18:59 06:59 18:59


 


Intake Total  550 138


 


Output Total 3175 2350 1750


 


Balance -3175 -1800 -1612


 


Weight 84.9 kg 86 kg 


 


Intake:   


 


  IV  10 20


 


    Invasive Line 10  10 10


 


    Invasive Line 9   10


 


  Oral  540 118


 


Output:   


 


  Urine 3175 2350 1750


 


    Uretheral (Seo)  550 


 


Other:   


 


  Voiding Method Indwelling Catheter Indwelling Catheter Toilet





   Urinal





   Incontinent








                       ABP, PAP, CO, CI - Last Documented











Arterial Blood Pressure        118/42

















- Exam





Mental status, speech and language functions are normal.  Cranial nerves are 

normal visual fields are full.  Face is symmetric.  Muscle strength testing 

there is no pronator drift.  Strength is equal.  He has shoulder issues from 

arthritis or rotator cuff problems.  No extremity is a normal.  Sensory is equ

al.  No ataxia.





- Labs


CBC & Chem 7: 


                                 07/27/24 07:41





                                 07/27/24 07:41


Labs: 


                  Abnormal Lab Results - Last 24 Hours (Table)











  07/25/24 07/25/24 07/25/24 Range/Units





  17:14 20:23 23:45 


 


POC Glucose (mg/dL)  187 H  187 H  206 H  ()  mg/dL


 


HDL Cholesterol     (40.00-60.00)  mg/dL














  07/26/24 07/26/24 07/26/24 Range/Units





  05:34 05:58 11:59 


 


POC Glucose (mg/dL)   173 H  220 H  ()  mg/dL


 


HDL Cholesterol  36.20 L    (40.00-60.00)  mg/dL








                      Microbiology - Last 24 Hours (Table)











 07/23/24 16:35 Blood Culture - Preliminary





 Blood 














Assessment and Plan


Assessment: 





* Acute encephalopathy, completely dissolved.  Exact etiology unclear.   Rule 

  out septic/toxic metabolic encephalopathy.  CVA ruled out.  Rule out TIA.  

  Current NIH stroke scale is 0.


* Severe left, greater than 75% stenosis left mid common carotid artery and in 

  the origin of the left ICA.  This could be potentially symptomatic.  CVA could

  be related to left ICA stenosis, and septic emboli also possibility with 

  evidence of acute wound infection, and fever 100.3.


* Admission to the hospital for altered mental status and accidental fentanyl 

  overdose.


* Left ICA stenosis severe, greater than 75% in the mid left common carotid 

  artery and in the origin of the left ICA (per CTA report).


* Status post extubation 7/18/2024.


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


* Acute kidney injury


* Metabolic acidosis


* Acute systolic congestive heart failure with EF 40%.


* Moderate to severe aortic stenosis.


* CAD with history of bypass grafting 2010.


* Hypertension


* Diabetes


* Hyperlipidemia


* Noncompliance with medication.








Plan: 





* Patient has acute encephalopathy, unclear cause.  Rule out septic or toxic 

  metabolic encephalopathy versus TIA.  Acute stroke ruled out.





* CTA of the head and neck was done, which revealed possible significant 

  stenosis of the origin left common carotid artery.  Severe greater than 75% 

  hemodynamically significant stenosis in the mid left common carotid artery and

  in the origin of the left internal carotid artery.  Mild to moderate stenosis 

  in the proximal right ICA.  No significant occlusive disease aneurysm or 

  vascular malformation intracranially.


* Vascular surgery input appreciated.


* Carotid Doppler revealed findings suggestive of 50-69% stenosis involving the 

  proximal right ICA with severe atherosclerotic plaque noted bilaterally.  

  Antegrade flow in both vertebral arteries.  Vascular surgery recommending 

  medical management.  Patient declining any vascular surgical intervention 

  either.





* MRI of the brain, revealed age-related atrophic and chronic small vessel 

  ischemic change.  No acute intracranial process at this time.  No enhancing 

  lesions are seen.  I personally reviewed MR agree with the findings.


* 2D echo performed 7/12/2024 revealed LVEF 40%.  Moderate MR, moderate to 

  severe aortic stenosis.  Consider KIRSTIE.


* Hemoglobin A1c 6.4


* Fasting lipid panel cholesterol 121, LDL 66, HDL 36, triglycerides 90.  Start 

  Lipitor 40 mg daily.


* B12 335, homocystine 10.4, RBC folate 509.  TSH 3.73.  Ammonia is normal 13.  

  Start B12 replacement.


* Telemetry monitoring, rule out arrhythmia.


* EEG was normal during wakefulness, drowsiness and stage II sleep.  No focal, 

  lateralized or epileptiform activity was seen.


* Continue neurochecks.


* Start aspirin regimen of 325 mg daily.  


* DVT prophylaxis: Patient on heparin 5000 units subcu every 8 hours.


* Other medical management as per IM and other specialties on board.  Patient on

  cefepime 2 g every 8 hours and vancomycin for wound infection.

## 2024-07-27 NOTE — XR
EXAMINATION TYPE: XR chest 1V portable

 

DATE OF EXAM: 7/27/2024

 

COMPARISON: 7/26/2024

 

HISTORY: Shortness of breath

 

TECHNIQUE: Single frontal view of the chest is obtained.

 

FINDINGS:  

 

The diffuse partially consolidated airspace opacities, right greater than left are unchanged compared
 to previous.

 

There is no large pleural effusion. There is no pneumothorax.

 

There is a remote healing fracture of the mid left clavicle. There is superior subluxation of the rig
ht glenoid humeral joint consistent with chronic rotator cuff tear.

 

IMPRESSION:  

1. Acute cardiopulmonary disease with no interval change.

2. Chronic rotator cuff tear right shoulder.

3. partially healed left clavicle fracture.

## 2024-07-27 NOTE — PN
PROGRESS NOTE



DATE OF SERVICE:  07/27/2024



SUBJECTIVE:

This is a 73-year-old gentleman, who was admitted with multiple complex medical issues

including hypoxic respiratory failure, apparently had aspiration pneumonia.  The

patient is being monitored in telemetry.  The patient currently has significant

shortness of breath.  Chest x-ray showed worsening lesions. According to the patient

wishes, hospice is also being arranged at this time.  The family is aware and we had

discussed with the family on multiple occasions regarding the diagnosis and prognosis

implications.



PAST MEDICAL HISTORY:

Reviewed.



REVIEW OF SYSTEMS:

A 14-point review of systems is negative except as mentioned earlier.



CURRENT MEDICATIONS:

Reviewed include DuoNeb.



PHYSICAL EXAMINATION:

VITAL SIGNS: Pulse is 111, blood pressure 108/69, respirations 28.

HEENT: Conjunctivae normal.

CARDIOVASCULAR: S1, S2.

RESPIRATIONS: Bilateral scattered rhonchi and crackles, expiratory wheezing.

ABDOMEN:  Soft.

NERVOUS SYSTEM: Nonfocal.



LABORATORY DATA:

Reviewed.



ASSESSMENT:

1. Congestive heart failure acute exacerbation with bilateral aspiration pneumonia

    with hemoptysis.

2. Carotid stenosis.

3. Change in mental status, metabolic encephalopathy.

4. Acute hypoxic respiratory failure.

5. Multiple complex medical issues.

6. No code.  No CPR. No vent.

7. For hospice care.



RECOMMENDATIONS:

Recommend to continue current management and continue symptomatic treatment. Continue

with comfort measures as mentioned earlier.  It is the patient's wish to discontinue

all the medications and obtain comfort measures.  I would recommend hospice

consultation. Multiple consultants notes are appreciated.  Prognosis remain guarded.

Discussed with the family.  Further recommendations to follow.





MMKIL / CARLON: [id number] / Job#: [id number]
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
## 2024-07-27 NOTE — P.PN
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














  07/25/24 07/25/24 07/26/24 Range/Units





  20:23 23:45 05:58 


 


WBC     (3.8-10.6)  k/uL


 


RBC     (4.30-5.90)  m/uL


 


Hgb     (13.0-17.5)  gm/dL


 


Hct     (39.0-53.0)  %


 


MCV     (80.0-100.0)  fL


 


Neutrophils #     (1.3-7.7)  k/uL


 


Lymphocytes #     (1.0-4.8)  k/uL


 


INR     (<1.2)  


 


APTT     (22.0-30.0)  sec


 


ABG pO2     ()  mmHg


 


ABG HCO3     (21-25)  mmol/L


 


ABG Total CO2     (19-24)  mmol/L


 


Sodium     (137-145)  mmol/L


 


Potassium     (3.5-5.1)  mmol/L


 


BUN     (9-20)  mg/dL


 


Glucose     (74-99)  mg/dL


 


POC Glucose (mg/dL)  187 H  206 H  173 H  ()  mg/dL


 


Calcium     (8.4-10.2)  mg/dL


 


Magnesium     (1.6-2.3)  mg/dL


 


Troponin I     (0.000-0.034)  ng/mL


 


Total Protein     (6.3-8.2)  g/dL


 


Albumin     (3.5-5.0)  g/dL








                      Microbiology - Last 24 Hours (Table)











 07/23/24 16:35 Blood Culture - Preliminary





 Blood

## 2024-07-28 LAB
ANION GAP SERPL CALC-SCNC: 4 MMOL/L
BUN SERPL-SCNC: 37 MG/DL (ref 9–20)
CALCIUM SPEC-MCNC: 7.4 MG/DL (ref 8.4–10.2)
CHLORIDE SERPL-SCNC: 99 MMOL/L (ref 98–107)
CO2 SERPL-SCNC: 29 MMOL/L (ref 22–30)
GLUCOSE BLD-MCNC: 159 MG/DL (ref 70–110)
GLUCOSE BLD-MCNC: 185 MG/DL (ref 70–110)
GLUCOSE BLD-MCNC: 207 MG/DL (ref 70–110)
GLUCOSE BLD-MCNC: 212 MG/DL (ref 70–110)
GLUCOSE BLD-MCNC: 247 MG/DL (ref 70–110)
GLUCOSE SERPL-MCNC: 247 MG/DL (ref 74–99)
MAGNESIUM SPEC-SCNC: 1.6 MG/DL (ref 1.6–2.3)
POTASSIUM SERPL-SCNC: 4.8 MMOL/L (ref 3.5–5.1)
SODIUM SERPL-SCNC: 132 MMOL/L (ref 137–145)

## 2024-07-28 RX ADMIN — MAGNESIUM SULFATE IN DEXTROSE SCH MLS/HR: 10 INJECTION, SOLUTION INTRAVENOUS at 21:20

## 2024-07-28 NOTE — P.PN
Subjective


Progress Note Date: 07/28/24





This is a 73-year-old gentleman with coronary artery disease status post CABG 

and valvular heart disease known moderate to severe aortic stenosis as well as 

cardiomyopathy with a EF around 40% as well as multiple comorbid conditions was 

admitted to the hospital initially with suicidal attempt.  We have been treating

the patient for heart failure.





July 27, 2024


The patient was seen and evaluated this morning.  He is still hypoxic requiring 

oxygen.  Hemodynamically he is stable but he still in mild sinus tachycardia 

with heart rate above 100 beats per minutes.  Currently he is on Lasix IV and he

is on metoprolol as well.  Urine output has been adequate.  Examination is 

remarkable for regular rhythm with a crescendo decrescendo murmur at right upper

sternal border and diminished breathing sounds bilaterally and no edema was 

noted in the lower extremities





July 28, 2024


The patient was seen this morning.  He continues to be hypoxic on oxygen.  He is

on IV Lasix but he has been refusing the Lasix intermittently.  No symptoms of 

chest pain or chest discomfort but shortness of breath.  The examination is 

remarkable for regular rhythm with mild sinus tachycardia and systolic murmur at

the right upper sternal border as well as bilateral rhonchi noted.





Assessment


Heart failure with reduced ejection fraction


Sinus tachycardia which has improved


CAD status post CABG


Valvular heart disease with aortic stenosis


Cardiomyopathy


Multiple comorbid conditions





Plan


Continue the current dose of Lasix IV


Continue monitor the kidney function and electrolytes


Follow-up with the patient





Objective





- Vital Signs


Vital signs: 


                                   Vital Signs











Temp  98.1 F   07/27/24 20:00


 


Pulse  85   07/28/24 02:00


 


Resp  18   07/28/24 02:00


 


BP  105/67   07/28/24 02:00


 


Pulse Ox  95   07/28/24 02:00


 


FiO2  30   07/18/24 11:57








                                 Intake & Output











 07/27/24 07/28/24 07/28/24





 18:59 06:59 18:59


 


Intake Total 496 540 


 


Output Total 1200 1200 


 


Balance -704 -660 


 


Weight  87 kg 


 


Intake:   


 


  IV 20  


 


    Invasive Line 10 10  


 


    Invasive Line 9 10  


 


  Oral 476 540 


 


Output:   


 


  Urine 1200 1200 


 


Other:   


 


  Voiding Method Toilet Toilet 





 Urinal Urinal 





 Incontinent Incontinent 








                       ABP, PAP, CO, CI - Last Documented











Arterial Blood Pressure        118/42

















- Labs


CBC & Chem 7: 


                                 07/27/24 07:41





                                 07/27/24 07:41


Labs: 


                  Abnormal Lab Results - Last 24 Hours (Table)











  07/27/24 07/27/24 07/27/24 Range/Units





  07:41 07:41 11:33 


 


RBC  3.28 L    (4.30-5.90)  m/uL


 


Hgb  10.5 L    (13.0-17.5)  gm/dL


 


Hct  33.9 L    (39.0-53.0)  %


 


MCV  103.4 H    (80.0-100.0)  fL


 


MCHC  30.9 L    (31.0-37.0)  g/dL


 


BUN   25 H   (9-20)  mg/dL


 


Glucose   197 H   (74-99)  mg/dL


 


POC Glucose (mg/dL)    274 H  ()  mg/dL


 


Calcium   7.7 L   (8.4-10.2)  mg/dL


 


Magnesium   1.2 L   (1.6-2.3)  mg/dL














  07/27/24 07/28/24 07/28/24 Range/Units





  16:12 00:05 06:05 


 


RBC     (4.30-5.90)  m/uL


 


Hgb     (13.0-17.5)  gm/dL


 


Hct     (39.0-53.0)  %


 


MCV     (80.0-100.0)  fL


 


MCHC     (31.0-37.0)  g/dL


 


BUN     (9-20)  mg/dL


 


Glucose     (74-99)  mg/dL


 


POC Glucose (mg/dL)  236 H  207 H  159 H  ()  mg/dL


 


Calcium     (8.4-10.2)  mg/dL


 


Magnesium     (1.6-2.3)  mg/dL








                      Microbiology - Last 24 Hours (Table)











 07/26/24 19:27 Gram Stain - Preliminary





 Sputum 


 


 07/23/24 16:35 Blood Culture - Preliminary





 Blood
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
## 2024-07-29 VITALS — DIASTOLIC BLOOD PRESSURE: 56 MMHG | HEART RATE: 89 BPM | SYSTOLIC BLOOD PRESSURE: 102 MMHG

## 2024-07-29 VITALS — RESPIRATION RATE: 16 BRPM

## 2024-07-29 VITALS — TEMPERATURE: 98.1 F

## 2024-07-29 LAB
GLUCOSE BLD-MCNC: 164 MG/DL (ref 70–110)
GLUCOSE BLD-MCNC: 203 MG/DL (ref 70–110)
GLUCOSE BLD-MCNC: 211 MG/DL (ref 70–110)

## 2024-07-29 RX ADMIN — SODIUM CHLORIDE SCH MLS/HR: 9 INJECTION, SOLUTION INTRAVENOUS at 12:55

## 2024-07-29 RX ADMIN — CYANOCOBALAMIN TAB 500 MCG SCH MCG: 500 TAB at 08:53

## 2024-07-29 NOTE — P.PN
Subjective


Progress Note Date: 07/29/24


Principal diagnosis: 





Reason for follow-up is right leg wound and osteomyelitis





The patient is a 73-year-old  male with a past medical history 

significant for diabetes mellitus hypertension hyperlipidemia coronary disease 

prostate disorder, patient did have a history of previous injury to the right 

leg requiring operative repair with hardware and has been dealing with a 

nonhealing wound to the right anterior leg for few years presented to hospital 

mental status changes possible overdose on fentanyl patch with a worsening wound

to the right leg prompting this consultation.


On today's evaluation that is 07/29/2024, the patient continues to be afebrile, 

the patient is on 6 L nasal oxygen and denies any worsening shortness of breath 

the Pt denies having any chest pain or any worsening cough, the patient denies 

having any abdominal pain no vomiting or any diarrhea denies any worsening pain 

to the right leg.


No new lab has been obtained today





Objective





- Vital Signs


Vital signs: 


                                   Vital Signs











Temp  98.0 F   07/29/24 08:25


 


Pulse  88   07/29/24 09:20


 


Resp  22   07/29/24 09:20


 


BP  102/58   07/29/24 08:25


 


Pulse Ox  94 L  07/29/24 08:25


 


FiO2  30   07/18/24 11:57








                                 Intake & Output











 07/28/24 07/29/24 07/29/24





 18:59 06:59 18:59


 


Intake Total 510 10 250


 


Output Total 1300 650 


 


Balance -790 -640 250


 


Weight  86 kg 


 


Intake:   


 


  IV 10 10 10


 


    Invasive Line 10 10 10 10


 


  Oral 500  240


 


Output:   


 


  Urine 1300 650 


 


Other:   


 


  Voiding Method Toilet Urinal Urinal





 Urinal Incontinent Incontinent





 Incontinent  


 


  # Voids  1 3


 


  # Bowel Movements 1  








                       ABP, PAP, CO, CI - Last Documented











Arterial Blood Pressure        118/42

















- Exam





GENERAL DESCRIPTION: An elderly male lying in bed in no distress





RESPIRATORY SYSTEM: Unlabored breathing , decreased breath sounds at bases





HEART: S1 S2 regular rate and rhythm ,





ABDOMEN: Soft , no tenderness





EXTREMITIES: Right leg wound is currently dressed no drainage on the dressing





- Labs


CBC & Chem 7: 


                                 07/27/24 07:41





                                 07/28/24 12:40


Labs: 


                  Abnormal Lab Results - Last 24 Hours (Table)











  07/28/24 07/28/24 07/28/24 Range/Units





  12:40 16:57 20:00 


 


Sodium  132 L    (137-145)  mmol/L


 


BUN  37 H    (9-20)  mg/dL


 


Glucose  247 H    (74-99)  mg/dL


 


POC Glucose (mg/dL)   185 H  212 H  ()  mg/dL


 


Calcium  7.4 L    (8.4-10.2)  mg/dL














  07/29/24 07/29/24 Range/Units





  06:01 11:33 


 


Sodium    (137-145)  mmol/L


 


BUN    (9-20)  mg/dL


 


Glucose    (74-99)  mg/dL


 


POC Glucose (mg/dL)  164 H  211 H  ()  mg/dL


 


Calcium    (8.4-10.2)  mg/dL








                      Microbiology - Last 24 Hours (Table)











 07/26/24 19:27 Gram Stain - Preliminary





 Sputum Sputum Culture - Preliminary





    Presumptive Staph aureus


 


 07/23/24 16:35 Blood Culture - Final





 Blood 














Assessment and Plan


(1) Allergy to multiple antibiotics


Current Visit: Yes   Status: Acute   Code(s): Z88.1 - ALLERGY STATUS TO OTHER 

ANTIBIOTIC AGENTS   SNOMED Code(s): 816506388


   





(2) Leukocytosis


Current Visit: Yes   Status: Acute   Code(s): D72.829 - ELEVATED WHITE BLOOD 

CELL COUNT, UNSPECIFIED   SNOMED Code(s): 776398627


   





(3) Leg wound, right


Current Visit: Yes   Status: Acute   Code(s): S81.801A - UNSPECIFIED OPEN WOUND,

RIGHT LOWER LEG, INITIAL ENCOUNTER   SNOMED Code(s): 08027520618915087


   


Plan: 





1patient presented to hospital with mental status changes possibly fentanyl 

overdose patches has been removed patient also have chronic nonhealing wound to 

the right leg which has been there for many years with exposed hardware and 

likely concerning for osteomyelitis patient wound has increased in size compared

to 2-year ago when I saw the patient last with the hardware exposed and highly 

suspicious for possible osteomyelitis .


2local wound culture currently growing MRSA and bacteroids patient did have 

elevated sed rate of 85


3patient with multiple antibiotic ALLERGIES that would limit the number of 

antibiotic safe to use.


4patient benefit from removal of the infected hardware in the wound bed in 

order to completely heal this infection x-rays were reviewed with the family as 

well as with the radiologist and more likely the upper screw that is visible 

case has been discussed with orthopedics patient was evaluated and was scheduled

for removal of the hardware subsequently orthopedic has signed off


5patient did have slight worsening of his respiratory status also develop a 

fever concerning for possible aspiration pneumonitis blood and sputum culture 

are currently pending at this point


6- patient did have resolution of the fever and the white count normalized, 


7-patient to continue vancomycin cefepime and Flagyl however patient mention he 

wants to go hospice which may be appropriate for him if that is the case 

antibiotics can be safely discontinued





Dictation was produced using dragon dictation software. please excuse any 

grammatical, word or spelling errors. 








Time with Patient: Less than 30

## 2024-07-29 NOTE — P.PN
Subjective


Progress Note Date: 07/29/24








This is a 73-year-old gentleman with coronary artery disease status post CABG 

and valvular heart disease known moderate to severe aortic stenosis as well as 

cardiomyopathy with a EF around 40% as well as multiple comorbid conditions was 

admitted to the hospital initially with suicidal attempt.  We have been treating

the patient for heart failure.





July 27, 2024


The patient was seen and evaluated this morning.  He is still hypoxic requiring 

oxygen.  Hemodynamically he is stable but he still in mild sinus tachycardia 

with heart rate above 100 beats per minutes.  Currently he is on Lasix IV and he

is on metoprolol as well.  Urine output has been adequate.  Examination is 

remarkable for regular rhythm with a crescendo decrescendo murmur at right upper

sternal border and diminished breathing sounds bilaterally and no edema was 

noted in the lower extremities





July 28, 2024


The patient was seen this morning.  He continues to be hypoxic on oxygen.  He is

on IV Lasix but he has been refusing the Lasix intermittently.  No symptoms of 

chest pain or chest discomfort but shortness of breath.  The examination is 

remarkable for regular rhythm with mild sinus tachycardia and systolic murmur at

the right upper sternal border as well as bilateral rhonchi noted.





July 29, 2024


Patient is seen and examined at bedside this morning.  Patient reports that he 

has chosen to going for hospice.  He has been transition to hospice care this 

morning.





Assessment


Heart failure with reduced ejection fraction


Sinus tachycardia which has improved


CAD status post CABG


Valvular heart disease with aortic stenosis


Cardiomyopathy


Multiple comorbid conditions





Plan


Continue current medications without any changes.  He has been transition to 

hospice care this morning.


Cardio team will sign off


contact us in case of quetions





Objective





- Vital Signs


Vital signs: 


                                   Vital Signs











Temp  98.0 F   07/29/24 08:25


 


Pulse  77   07/29/24 12:54


 


Resp  22   07/29/24 09:20


 


BP  102/58   07/29/24 08:25


 


Pulse Ox  94 L  07/29/24 08:25


 


FiO2  30   07/18/24 11:57








                                 Intake & Output











 07/28/24 07/29/24 07/29/24





 18:59 06:59 18:59


 


Intake Total 510 10 250


 


Output Total 1300 650 


 


Balance -790 -640 250


 


Weight  86 kg 


 


Intake:   


 


  IV 10 10 10


 


    Invasive Line 10 10 10 10


 


  Oral 500  240


 


Output:   


 


  Urine 1300 650 


 


Other:   


 


  Voiding Method Toilet Urinal Urinal





 Urinal Incontinent Incontinent





 Incontinent  


 


  # Voids  1 3


 


  # Bowel Movements 1  








                       ABP, PAP, CO, CI - Last Documented











Arterial Blood Pressure        118/42

















- Labs


CBC & Chem 7: 


                                 07/27/24 07:41





                                 07/28/24 12:40


Labs: 


                  Abnormal Lab Results - Last 24 Hours (Table)











  07/28/24 07/28/24 07/28/24 Range/Units





  12:40 16:57 20:00 


 


Sodium  132 L    (137-145)  mmol/L


 


BUN  37 H    (9-20)  mg/dL


 


Glucose  247 H    (74-99)  mg/dL


 


POC Glucose (mg/dL)   185 H  212 H  ()  mg/dL


 


Calcium  7.4 L    (8.4-10.2)  mg/dL














  07/29/24 07/29/24 Range/Units





  06:01 11:33 


 


Sodium    (137-145)  mmol/L


 


BUN    (9-20)  mg/dL


 


Glucose    (74-99)  mg/dL


 


POC Glucose (mg/dL)  164 H  211 H  ()  mg/dL


 


Calcium    (8.4-10.2)  mg/dL








                      Microbiology - Last 24 Hours (Table)











 07/26/24 19:27 Gram Stain - Preliminary





 Sputum Sputum Culture - Preliminary





    Presumptive Staph aureus


 


 07/23/24 16:35 Blood Culture - Final





 Blood
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
## 2024-07-29 NOTE — P.PN
Subjective


Progress Note Date: 07/28/24





Patient was seen for a follow-up.  Patient is slightly somnolent.  Patient 

states he feels "way way better".  Patient denies headache.  Denies any episodes

of unresponsiveness.





Objective





- Vital Signs


Vital signs: 


                                   Vital Signs











Temp  97.6 F   07/28/24 23:10


 


Pulse  91   07/28/24 23:10


 


Resp  34 H  07/28/24 23:10


 


BP  102/63   07/28/24 23:10


 


Pulse Ox  97   07/28/24 23:10


 


FiO2  30   07/18/24 11:57








                                 Intake & Output











 07/28/24 07/28/24 07/29/24





 06:59 18:59 06:59


 


Intake Total 540 510 10


 


Output Total 1200 1300 


 


Balance -660 -790 10


 


Weight 87 kg  


 


Intake:   


 


  IV  10 10


 


    Invasive Line 10  10 10


 


  Oral 540 500 


 


Output:   


 


  Urine 1200 1300 


 


Other:   


 


  Voiding Method Toilet Toilet Urinal





 Urinal Urinal Incontinent





 Incontinent Incontinent 


 


  # Bowel Movements  1 








                       ABP, PAP, CO, CI - Last Documented











Arterial Blood Pressure        118/42

















- Exam





Mental status, speech and language functions are normal.  Cranial nerves are 

normal visual fields are full.  Face is symmetric.  Muscle strength testing 

there is no pronator drift.  Strength is equal.  He has shoulder issues from 

arthritis or rotator cuff problems.  Lower extremity is a normal.  Sensory is 

equal.  No ataxia.





- Labs


CBC & Chem 7: 


                                 07/27/24 07:41





                                 07/28/24 12:40


Labs: 


                  Abnormal Lab Results - Last 24 Hours (Table)











  07/28/24 07/28/24 07/28/24 Range/Units





  06:05 11:35 12:40 


 


Sodium    132 L  (137-145)  mmol/L


 


BUN    37 H  (9-20)  mg/dL


 


Glucose    247 H  (74-99)  mg/dL


 


POC Glucose (mg/dL)  159 H  247 H   ()  mg/dL


 


Calcium    7.4 L  (8.4-10.2)  mg/dL














  07/28/24 07/28/24 Range/Units





  16:57 20:00 


 


Sodium    (137-145)  mmol/L


 


BUN    (9-20)  mg/dL


 


Glucose    (74-99)  mg/dL


 


POC Glucose (mg/dL)  185 H  212 H  ()  mg/dL


 


Calcium    (8.4-10.2)  mg/dL








                      Microbiology - Last 24 Hours (Table)











 07/26/24 19:27 Gram Stain - Preliminary





 Sputum Sputum Culture - Preliminary














Assessment and Plan


Assessment: 





* Acute encephalopathy, completely dissolved.  Exact etiology unclear.   Rule 

  out septic/toxic metabolic encephalopathy.  CVA ruled out.  Rule out TIA.  

  Current NIH stroke scale is 0.


* Severe left, greater than 75% stenosis left mid common carotid artery and in 

  the origin of the left ICA.  This could be potentially symptomatic.  CVA could

  be related to left ICA stenosis, and septic emboli also possibility with 

  evidence of acute wound infection, and fever 100.3.


* Admission to the hospital for altered mental status and accidental fentanyl 

  overdose.


* Left ICA stenosis severe, greater than 75% in the mid left common carotid 

  artery and in the origin of the left ICA (per CTA report).


* Status post extubation 7/18/2024.


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


* Acute kidney injury


* Metabolic acidosis


* Acute systolic congestive heart failure with EF 40%.


* Moderate to severe aortic stenosis.


* CAD with history of bypass grafting 2010.


* Hypertension


* Diabetes


* Hyperlipidemia


* Noncompliance with treatment.








Plan: 





* Patient has acute encephalopathy, unclear cause.  Rule out septic or toxic 

  metabolic encephalopathy versus TIA.  Acute stroke ruled out.





* CTA of the head and neck was done, which revealed possible significant 

  stenosis of the origin left common carotid artery.  Severe greater than 75% 

  hemodynamically significant stenosis in the mid left common carotid artery and

  in the origin of the left internal carotid artery.  Mild to moderate stenosis 

  in the proximal right ICA.  No significant occlusive disease aneurysm or 

  vascular malformation intracranially.


* Vascular surgery input appreciated.


* Carotid Doppler revealed findings suggestive of 50-69% stenosis involving the 

  proximal right ICA with severe atherosclerotic plaque noted bilaterally.  

  Antegrade flow in both vertebral arteries.  


* Vascular surgery recommending medical management.  Patient declining any 

  vascular surgical intervention either.  Dr. Avila from interventional 

  cardiology also following the patient.


* Continue aspirin 325 mg daily.





* MRI of the brain, revealed age-related atrophic and chronic small vessel 

  ischemic change.  No acute intracranial process at this time.  No enhancing 

  lesions are seen.  I personally reviewed MR agree with the findings.


* 2D echo performed 7/12/2024 revealed LVEF 40%.  Moderate MR, moderate to 

  severe aortic stenosis.  Consider KIRSTIE.  Cardiology following.


* Hemoglobin A1c 6.4


* Fasting lipid panel cholesterol 121, LDL 66, HDL 36, triglycerides 90.  Start 

  Lipitor 40 mg daily.


* B12 335, homocystine 10.4, RBC folate 509.  TSH 3.73.  Ammonia is normal 13.  

  Start B12 replacement.


* Telemetry monitoring, rule out arrhythmia.


* EEG was normal during wakefulness, drowsiness and stage II sleep.  No focal, 

  lateralized or epileptiform activity was seen.


* Continue neurochecks.


 


* DVT prophylaxis: Patient on heparin 5000 units subcu every 8 hours.


* Other medical management as per IM and other specialties on board.  Patient on

  cefepime 2 g every 8 hours and vancomycin for wound infection.


* Dr. Abbasi starting neurology service from the morning.

## 2024-07-29 NOTE — PN
PROGRESS NOTE



DATE OF SERVICE:  07/28/2024



SUBJECTIVE:

This 73-year-old gentleman who was admitted with CHF acute exacerbation.  He is 
no

code, and the patient is requesting hospice at home.



PHYSICAL EXAMINATION:

VITAL SIGNS:  Pulse is 102, blood pressure n, respirations 28.

CHEST:  Few scattered rhonchi.

ABDOMEN:  Soft.

NERVOUS SYSTEM:  Nonfocal.



LABORATORY DATA:

Reviewed.



ASSESSMENT:

1. Congestive heart failure.

2. acute exacerbation with bilateral aspiration pneumonia with hemoptysis.

3. Carotid stenosis.

4. Change in mental status, metabolic encephalopathy.

5. Acute hypoxic respiratory failure.

6. Multiple complex medical issues.

7. No code, no CPR, no vent.

8. For hospice at home.



RECOMMENDATIONS:

Recommend to continue current management and continue with symptomatic 
treatment.

Otherwise,  and  to work to arrange hospice at home.

Prognosis guarded.





MICHEAL / PRASHANTH: [id number] / Job#: [id number]

MTDD

## 2024-07-30 NOTE — CDI
Documentation Clarification Form



Date: 07/30/2024 01:44:28 PM

From: Ariana Santos

Phone: 

MRN: [id number]

Admit Date: 07/10/2024 12:41:00 PM

Patient Name: Loy Spencer

Visit Number: [id number]

Discharge Date:  07/29/2024 07:11:00 PM





ATTENTION: The Clinical Documentation Specialists (CDI) and [facility] Coding Staff 
appreciate your assistance in clarifying documentation. Please respond to the 
clarification below the line at the bottom and electronically sign. The CDI & 
[facility] Coding staff will review the response and follow-up if needed. Please note: 
Queries are made part of the Legal Health Record. If you have any questions, 
please contact the author of this message via ITS.



Doctor/Provider: Satya Ramos



Hypotension is documented in per OP Note 7/12.  Additional clarification 
regarding this diagnosis is requested.



History/Risk Factors: 72yo M, SI w fentanyl OD, sepsis, AHRF, ATN, tox/met 
enceph, RLE ulcer w bone hardware exposure, HTN, HLD, DMII, ACSHF, CAD, AS/MR, 
ICM, osteomyelitis- refusal of amputation or removal of hardware, former smoker



Clinical Indicators:  

VS:  Temp 97.7 F   Pulse 82   Resp 20   /57   Pulse Ox 98   FiO2 70

Elevated sed rateof 85



Treatment: Norepinephrine4 mg; intubated,mechanically ventilated; daptomycin 
and Flagyl to cover for the bacteroids discontinue cefepime as no gram-negative 
has been grown; Pt made DNR and palliative care



Can the hypotension be further specified?

[  ] Postoperative Hypotension



[  ] Hypotension due to sepsis

  [  x] Severe sepsis

    [  ] Due to MRSA

    [  ] Due to Bacteroids



[  ] Other Condition, please specify ______



[  ] Unable to determine



(Template Last Revised: March 2021)

___________________________________________________________________________

MTDD

## 2024-07-30 NOTE — CDI
Documentation Clarification Form



Date: 07/30/2024 01:33:07 PM

From: Ariana Santos

Phone: 

MRN: [id number]

Admit Date: 07/10/2024 12:41:00 PM

Patient Name: Loy Spencer

Visit Number: [id number]

Discharge Date:  07/29/2024 07:11:00 PM





ATTENTION: The Clinical Documentation Specialists (CDI) and [facility] Coding Staff 
appreciate your assistance in clarifying documentation. Please respond to the 
clarification below the line at the bottom and electronically sign. The CDI & 
[facility] Coding staff will review the response and follow-up if needed. Please note: 
Queries are made part of the Legal Health Record. If you have any questions, 
please contact the author of this message via ITS.



Doctor/Provider: Nikita Baires



Your patient has an abnormal lab value: Glucose 202.  Please clarify if there is
an additional diagnosis and/or clinical significance related to this value.



History/Risk Factors: 74yo M, SI w fentanyl OD, sepsis, AHRF, ATN, tox/met 
enceph, RLE ulcer w bone hardware exposure, HTN, HLD, DMII, ACSHF, CAD, AS/MR, 
ICM, former smoker



Clinical indicators: 

Glucose: 7/10 131-281   7/11 124-281

A1C 6.4

He has history of diabetes for last 30+ years, but hasnot been treated for last
few years.



Treatment:  Patient found to have significant hyperkalemia patient was given 
D50,insulin, calcium gluconate, sodium bicarb started on bicarb drip patient 
did have butyl treatments given.



Is there an additional diagnosis and/or clinical significance related to the 
above lab result/information?



[ x ] Diabetes Mellitus with Hyperglycemia

[  ] No additional diagnosis/Not clinically significant

[  ] Other, please specify________________

[  ] Unable to determine





(Template Last Revised: February 2021)

___________________________________________________________________________



MTDD

## 2024-07-30 NOTE — CDI
Documentation Clarification Form



Date: 07/30/2024 01:59:16 PM

From: Ariana Santos

Phone: 

MRN: [id number]

Admit Date: 07/10/2024 12:41:00 PM

Patient Name: Loy Spencer

Visit Number: [id number]

Discharge Date:  07/29/2024 07:11:00 PM





ATTENTION: The Clinical Documentation Specialists (CDI) and [facility] Coding Staff 
appreciate your assistance in clarifying documentation. Please respond to the 
clarification below the line at the bottom and electronically sign. The CDI & 
[facility] Coding staff will review the response and follow-up if needed. Please note: 
Queries are made part of the Legal Health Record. If you have any questions, 
please contact the author of this message via ITS.



Doctor/Provider: Nikita Baires





Cellulitis is documented per Progress Note 7/13 in addition to.  Additional 
clarification regarding the type of cellulitis is requested.



History/risk factors: 72yo M, SI w fentanyl OD, sepsis, AHRF, ATN, tox/met 
enceph, RLE ulcer w bone hardware exposure, HTN, HLD, DMII, ACSHF, CAD, AS/MR, 
ICM, osteomyelitis- refusal of amputation or removal of hardware, former smoker



Clinical Indicators: Hx chronic RLE wound; Pt. is afebrile and Ortho was sent 
toremove some hardware in the RLE although Pt. does not appear medically stable
today at this time given respiratory status and refusing of treatment. Family at
the bedside discussing compliance although Pt. is persistentand continues to 
refuse. Ortho with no plans for surgical intervention at this time 
althoughlikelyneeds this intervention on the RLE as Pt.s white count is 
moreelevatedwith a mildlyelevated procalcitonin and CRP. Infectious diseases 
following



Treatment: Pt. continues on IV antibiotics per infectious disease and currently 
awaiting to undergo surgical intervention of the RLE with screwremovalwith 
Ortho on 7/23/2024. Continue with the daptomycin and Flagyl and monitor clinical
course closely; Pt refusing care; made DNR and palliative care



Please clarify the type of cellulitis, if known:

[ x ] Diabetic cellulitis 

[  ] Chronic Cellulitis

[  ] Other, please specify: ____________

[  ] Unable to determine



 

(Template Last Revised: January 2023)

___________________________________________________________________________

MTDD

## 2024-08-01 NOTE — CDI
Documentation Clarification Form



Date: 08/01/2024 11:00:30 AM

From: Ariana Santos

Phone: 

MRN: [id number]

Admit Date: 07/10/2024 12:41:00 PM

Patient Name: Loy Spencer

Visit Number: [id number]

Discharge Date:  07/29/2024 07:11:00 PM





ATTENTION: The Clinical Documentation Specialists (CDI) and [facility] Coding Staff 
appreciate your assistance in clarifying documentation. Please respond to the 
clarification below the line at the bottom and electronically sign. The CDI & 
[facility] Coding staff will review the response and follow-up if needed. Please note: 
Queries are made part of the Legal Health Record. If you have any questions, 
please contact the author of this message via ITS.



Doctor/Provider: Pingdaniel Viry





Sepsis is documented [insert date, location] which may lack sufficient clinical 
evidence/support in the medical record. Additional clarification is requested.



History/Risk Factors:  74yo M, SI w fentanyl OD,sepsis,AHRF,ATN, 
HTN,HLD,DMII, ACSHF,CAD, AS/MR, tox/met enceph,RLE ulcerw bone 
hardwareexposure,ICM,former smoker



Clinical Indicators: 

VS: 7/10 Temp 98.0   Pulse 102    Resp 8 L   /64   O2 Sat 99- 97

7/12 Temp 97.7       Pulse 82   Resp 20   /57   Pulse Ox 98FiO2 70



Treatment: He has chronicwoundon the RLE with exposed hardware. ID requested 
consult forpossiblescrew removalto better his chances of healing thiswound. 
He is in ICU due to his current condition andsepsisalong with other longterm. 
Norepinephrine4 mg; intubated,mechanically ventilated; daptomycin and Flagyl 
to cover for the bacteroids discontinue cefepime as no gram-negative has been 
grown; Pt madeDNRandpalliative care. 

 

After work up and study, please clarify which diagnosis is most appropriate and 
the clinical significance of wound culture results?



[ x ] Severe Sepsis was present on admission

   [ ] Due toMRSA 

   [ ] Due to Bacteroids



[  ] Severe Sepsis was not present on admission

   [ ] Due toMRSA 

   [ ] Due to Bacteroids



[  ] Other, please specify _____________



[  ] Unable to determine





___________________________________________________________________________

(Template Last Reviewed: January 2024)

___________________________________________________________________________

MTDD
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
